# Patient Record
Sex: MALE | Race: WHITE | NOT HISPANIC OR LATINO | Employment: OTHER | RURAL
[De-identification: names, ages, dates, MRNs, and addresses within clinical notes are randomized per-mention and may not be internally consistent; named-entity substitution may affect disease eponyms.]

---

## 2017-01-04 NOTE — TELEPHONE ENCOUNTER
Tramadol 50 mg 1 tablet 2 x a day  Last called in 8/23/16 # 60 with 3 refills    Last office visit 10/12/16  Follow up 1/11/17

## 2017-01-05 ENCOUNTER — OFFICE VISIT (OUTPATIENT)
Dept: FAMILY MEDICINE CLINIC | Facility: CLINIC | Age: 82
End: 2017-01-05

## 2017-01-05 VITALS
WEIGHT: 184.4 LBS | HEIGHT: 69 IN | SYSTOLIC BLOOD PRESSURE: 108 MMHG | HEART RATE: 70 BPM | TEMPERATURE: 98.6 F | BODY MASS INDEX: 27.31 KG/M2 | DIASTOLIC BLOOD PRESSURE: 60 MMHG

## 2017-01-05 DIAGNOSIS — M19.90 CHRONIC OSTEOARTHRITIS: Primary | ICD-10-CM

## 2017-01-05 DIAGNOSIS — L02.511 ABSCESS OF HAND, RIGHT: ICD-10-CM

## 2017-01-05 DIAGNOSIS — I10 ESSENTIAL HYPERTENSION: ICD-10-CM

## 2017-01-05 PROCEDURE — 87205 SMEAR GRAM STAIN: CPT | Performed by: FAMILY MEDICINE

## 2017-01-05 PROCEDURE — 87070 CULTURE OTHR SPECIMN AEROBIC: CPT | Performed by: FAMILY MEDICINE

## 2017-01-05 PROCEDURE — 99213 OFFICE O/P EST LOW 20 MIN: CPT | Performed by: FAMILY MEDICINE

## 2017-01-05 PROCEDURE — 87077 CULTURE AEROBIC IDENTIFY: CPT | Performed by: FAMILY MEDICINE

## 2017-01-05 PROCEDURE — 96372 THER/PROPH/DIAG INJ SC/IM: CPT | Performed by: FAMILY MEDICINE

## 2017-01-05 PROCEDURE — 87147 CULTURE TYPE IMMUNOLOGIC: CPT | Performed by: FAMILY MEDICINE

## 2017-01-05 PROCEDURE — 87186 SC STD MICRODIL/AGAR DIL: CPT | Performed by: FAMILY MEDICINE

## 2017-01-05 RX ORDER — METHYLPREDNISOLONE ACETATE 80 MG/ML
80 INJECTION, SUSPENSION INTRA-ARTICULAR; INTRALESIONAL; INTRAMUSCULAR; SOFT TISSUE ONCE
Status: COMPLETED | OUTPATIENT
Start: 2017-01-05 | End: 2017-01-05

## 2017-01-05 RX ORDER — TRAMADOL HYDROCHLORIDE 50 MG/1
50 TABLET ORAL 2 TIMES DAILY
Qty: 60 TABLET | Refills: 3
Start: 2017-01-05 | End: 2017-05-01 | Stop reason: SDUPTHER

## 2017-01-05 RX ORDER — TRAMADOL HYDROCHLORIDE 50 MG/1
TABLET ORAL
Qty: 60 TABLET | Refills: 0 | OUTPATIENT
Start: 2017-01-05

## 2017-01-05 RX ORDER — DOXYCYCLINE 100 MG/1
100 CAPSULE ORAL 2 TIMES DAILY
Qty: 20 CAPSULE | Refills: 0 | Status: SHIPPED | OUTPATIENT
Start: 2017-01-05 | End: 2017-01-15

## 2017-01-05 RX ORDER — METHYLPREDNISOLONE ACETATE 40 MG/ML
40 INJECTION, SUSPENSION INTRA-ARTICULAR; INTRALESIONAL; INTRAMUSCULAR; SOFT TISSUE ONCE
Status: COMPLETED | OUTPATIENT
Start: 2017-01-05 | End: 2017-01-05

## 2017-01-05 RX ADMIN — METHYLPREDNISOLONE ACETATE 80 MG: 80 INJECTION, SUSPENSION INTRA-ARTICULAR; INTRALESIONAL; INTRAMUSCULAR; SOFT TISSUE at 16:42

## 2017-01-05 RX ADMIN — METHYLPREDNISOLONE ACETATE 40 MG: 40 INJECTION, SUSPENSION INTRA-ARTICULAR; INTRALESIONAL; INTRAMUSCULAR; SOFT TISSUE at 16:43

## 2017-01-05 NOTE — TELEPHONE ENCOUNTER
Spoke with Risa she stated he may have enough to last until appointment.    She stated he has a spider bite that needs checked out , patient not home right now will call us back if he gets home soon so he can get appointment.

## 2017-01-05 NOTE — MR AVS SNAPSHOT
Rubén Rivas   1/5/2017 3:40 PM   Office Visit    Dept Phone:  [phone]   Encounter #:  [id number]    Provider:  Chapin Sánchez MD   Department:  [facility]                Your Full Care Plan              Today's Medication Changes          These changes are accurate as of: 1/5/17  4:39 PM.  If you have any questions, ask your nurse or doctor.               New Medication(s)Ordered:     doxycycline 100 MG capsule   Commonly known as:  MONODOX   Take 1 capsule by mouth 2 (Two) Times a Day for 10 days.   Started by:  Chapin Sánchez MD            Where to Get Your Medications      These medications were sent to [facility] [address] [place], [place] [address] [place] [address] - [phone]  - [phone] [address] [address]     Phone:  [phone]     doxycycline 100 MG capsule         Information about where to get these medications is not yet available     ! Ask your nurse or doctor about these medications     traMADol 50 MG tablet                  Your Updated Medication List          This list is accurate as of: 1/5/17  4:39 PM.  Always use your most recent med list.                aspirin 81 MG tablet       atorvastatin 80 MG tablet   Commonly known as:  LIPITOR       clopidogrel 75 MG tablet   Commonly known as:  PLAVIX       diphenhydrAMINE-acetaminophen  MG tablet per tablet   Commonly known as:  TYLENOL PM       doxycycline 100 MG capsule   Commonly known as:  MONODOX   Take 1 capsule by mouth 2 (Two) Times a Day for 10 days.       escitalopram 20 MG tablet   Commonly known as:  LEXAPRO   Take 1 tablet by mouth Daily.       ferrous sulfate 325 (65 FE) MG tablet   Take 1 tablet by mouth 3 (three) times a day with meals.       metoprolol tartrate 25 MG tablet   Commonly known as:  LOPRESSOR       ONE-A-DAY MENS 50+ ADVANTAGE PO       ONETOUCH ULTRA BLUE VI       pantoprazole 40 MG EC tablet   Commonly  known as:  PROTONIX       tamsulosin 0.4 MG capsule 24 hr capsule   Commonly known as:  FLOMAX       traMADol 50 MG tablet   Commonly known as:  ULTRAM   Take 1 tablet by mouth 2 (Two) Times a Day.               We Performed the Following     Culture, Routine       You Were Diagnosed With        Codes Comments    Chronic osteoarthritis    -  Primary ICD-10-CM: M19.90  ICD-9-CM: 715.90     Essential hypertension     ICD-10-CM: I10  ICD-9-CM: 401.9     Abscess of hand, right     ICD-10-CM: L02.511  ICD-9-CM: 682.4       Medications to be Given to You by a Medical Professional     Due       Frequency    2017 methylPREDNISolone acetate (DEPO-medrol) injection 40 mg  Once    2017 methylPREDNISolone acetate (DEPO-medrol) injection 80 mg  Once      Instructions     None    Patient Instructions History      Upcoming Appointments     Visit Type Date Time Department    OFFICE VISIT 2017  3:40 PM [facility]    FOLLOW UP 2017 10:40 AM [facility]    FOLLOW UP 2017  1:00 PM [facility]      [employer] Signup     [facility] [employer] allows you to send messages to your doctor, view your test results, renew your prescriptions, schedule appointments, and more. To sign up, go to [employer] and click on the Sign Up Now link in the New User? box. Enter your [employer] Activation Code exactly as it appears below along with the last four digits of your Social Security Number and your Date of Birth () to complete the sign-up process. If you do not sign up before the expiration date, you must request a new code.    [employer] Activation Code: [email or web address]  Expires: 2017  4:38 PM    If you have questions, you can email [email or web address] or call [phone] to talk to our [employer] staff. Remember, [employer] is NOT to be used for urgent needs. For medical emergencies, dial 911.               Other Info from Your Visit           Your Appointments     2017  "10:40 AM EST   Follow Up with Chapin Sánchez MD   [facility] (--)    [address] [facility] [address]   [phone]           Arrive 15 minutes prior to appointment.            May 01, 2017  1:00 PM EDT   Follow Up with Chapin Sánchez MD   [facility] (--)    [address] [facility] [address]   [phone]           Arrive 15 minutes prior to appointment.              Allergies     Sulfa Antibiotics        Reason for Visit     Insect Bite           Vital Signs     Blood Pressure Pulse Temperature Height Weight Body Mass Index    108/60 (BP Location: Left arm, Patient Position: Sitting) 70 98.6 °F (37 °C) (Oral) 69\" (175.3 cm) 184 lb 6.4 oz (83.6 kg) 27.23 kg/m2    Smoking Status                   Former Smoker           Problems and Diagnoses Noted     Chronic osteoarthritis    High blood pressure    Abscess of hand, right            "

## 2017-01-05 NOTE — PROGRESS NOTES
"Subjective   Rubén Rivas is a 81 y.o. male.     History of Present Illness follow-up regarding the chronic osteoarthritis.  Having difficulty with a new skin spot on the dorsum of his right hand started 2 days ago.  Painful.  Slightly red around it.  Pustular.  Woke up with it.  No other skin concerns.  Generally doing well otherwise.  Staying active.  The steroid injection in past has been immensely helpful for an extended period of time.  No chest CDV GI  changes    The following portions of the patient's history were reviewed and updated as appropriate: allergies, current medications, past family history, past social history, past surgical history and problem list.    Review of Systems see the history of present illness    Objective   Physical Exam   Constitutional: He is oriented to person, place, and time. He appears well-developed and well-nourished.   HENT:   Head: Normocephalic.   Mouth/Throat: Oropharynx is clear and moist.   Eyes: Conjunctivae and EOM are normal. Pupils are equal, round, and reactive to light.   Neck: Normal range of motion. Neck supple.   Cardiovascular: Normal rate, regular rhythm, normal heart sounds and intact distal pulses.    No murmur heard.  Pulmonary/Chest: Effort normal and breath sounds normal.   Neurological: He is alert and oriented to person, place, and time.   Skin: Skin is warm and dry.   Circumscribed  Pustular lesion dorsum right hand.  Minimal circumferential redness     Psychiatric: He has a normal mood and affect.   Vitals reviewed.    Visit Vitals   • /60 (BP Location: Left arm, Patient Position: Sitting)   • Pulse 70   • Temp 98.6 °F (37 °C) (Oral)   • Ht 69\" (175.3 cm)   • Wt 184 lb 6.4 oz (83.6 kg)   • BMI 27.23 kg/m2     Assessment/Plan   Problems Addressed this Visit        Cardiovascular and Mediastinum    Essential hypertension       Musculoskeletal and Integument    Chronic osteoarthritis - Primary    Relevant Medications    methylPREDNISolone acetate " (DEPO-medrol) injection 40 mg (Completed)    methylPREDNISolone acetate (DEPO-medrol) injection 80 mg (Completed)      Other Visit Diagnoses     Abscess of hand, right        Relevant Orders    Culture, Routine (Completed)       Will place on antibiotics pending the culture and sensitivity result.  Tramadol renewed as noted.  Injection of steroid also.  Stay safely active.  Keep this area clean and dry recheck in 4 months or as needed.  May need to change antibiotic and will do so based on result  As part of this patient's treatment plan I am prescribing controlled substances. The patient has been made aware of appropriate use of such medications, including potential risk of somnolence, limited ability to drive and/or work safely, and potential for dependence or overdose. It has also been made clear that these medications are for use by this patient only, without concomitant use of alcohol or other substances unless prescribed.  Patient has completed prescribing agreement detailing terms of continued prescribing of controlled substances, including monitoring [facility] reports, urine drug screening, and pill counts if necessary. The patient is aware that inappropriate use will results in cessation of prescribing such medications.  [facility] report has been reviewed.  [facility] is updated every 3 months.  History and physical exam exhibit continued safe and appropriate use of controlled substances.

## 2017-01-05 NOTE — TELEPHONE ENCOUNTER
If he is completely out and go ahead and give him a time today.  Otherwise can renew at visit as scheduled next week

## 2017-01-09 LAB
BACTERIA SPEC AEROBE CULT: ABNORMAL
GRAM STN SPEC: ABNORMAL
GRAM STN SPEC: ABNORMAL

## 2017-02-02 RX ORDER — ESCITALOPRAM OXALATE 20 MG/1
20 TABLET ORAL DAILY
Qty: 90 TABLET | Refills: 3 | Status: SHIPPED | OUTPATIENT
Start: 2017-02-02 | End: 2018-01-04 | Stop reason: SDUPTHER

## 2017-02-02 RX ORDER — PANTOPRAZOLE SODIUM 40 MG/1
40 TABLET, DELAYED RELEASE ORAL DAILY
Qty: 90 TABLET | Refills: 3 | Status: SHIPPED | OUTPATIENT
Start: 2017-02-02 | End: 2018-01-04 | Stop reason: SDUPTHER

## 2017-02-02 NOTE — TELEPHONE ENCOUNTER
Lexapro last sent 10/13/16 # 90 with 1 refill  Protonix last sent 4/12/16 # 90 with 3 refills.    Last office visit 1/11/17  Follow up 5/1/17

## 2017-05-01 ENCOUNTER — OFFICE VISIT (OUTPATIENT)
Dept: FAMILY MEDICINE CLINIC | Facility: CLINIC | Age: 82
End: 2017-05-01

## 2017-05-01 VITALS
HEART RATE: 64 BPM | TEMPERATURE: 97.8 F | WEIGHT: 183 LBS | SYSTOLIC BLOOD PRESSURE: 105 MMHG | DIASTOLIC BLOOD PRESSURE: 57 MMHG | BODY MASS INDEX: 27.11 KG/M2 | HEIGHT: 69 IN

## 2017-05-01 DIAGNOSIS — I10 ESSENTIAL HYPERTENSION: Primary | ICD-10-CM

## 2017-05-01 DIAGNOSIS — Z00.00 MEDICARE ANNUAL WELLNESS VISIT, INITIAL: ICD-10-CM

## 2017-05-01 DIAGNOSIS — I25.10 CHRONIC CORONARY ARTERY DISEASE: ICD-10-CM

## 2017-05-01 DIAGNOSIS — E61.1 IRON DEFICIENCY: ICD-10-CM

## 2017-05-01 DIAGNOSIS — M19.90 CHRONIC OSTEOARTHRITIS: ICD-10-CM

## 2017-05-01 LAB
ALBUMIN SERPL-MCNC: 4 G/DL (ref 3.4–4.8)
ALBUMIN/GLOB SERPL: 1.4 G/DL (ref 1.5–2.5)
ALP SERPL-CCNC: 47 U/L (ref 40–129)
ALT SERPL W P-5'-P-CCNC: 14 U/L (ref 10–44)
ANION GAP SERPL CALCULATED.3IONS-SCNC: 6 MMOL/L (ref 3.6–11.2)
AST SERPL-CCNC: 21 U/L (ref 10–34)
BASOPHILS # BLD AUTO: 0.05 10*3/MM3 (ref 0–0.3)
BASOPHILS NFR BLD AUTO: 0.7 % (ref 0–2)
BILIRUB SERPL-MCNC: 0.4 MG/DL (ref 0.2–1.8)
BUN BLD-MCNC: 20 MG/DL (ref 7–21)
BUN/CREAT SERPL: 16.3 (ref 7–25)
CALCIUM SPEC-SCNC: 9.2 MG/DL (ref 7.7–10)
CHLORIDE SERPL-SCNC: 110 MMOL/L (ref 99–112)
CHOLEST SERPL-MCNC: 153 MG/DL (ref 0–200)
CO2 SERPL-SCNC: 29 MMOL/L (ref 24.3–31.9)
CREAT BLD-MCNC: 1.23 MG/DL (ref 0.43–1.29)
DEPRECATED RDW RBC AUTO: 50.4 FL (ref 37–54)
EOSINOPHIL # BLD AUTO: 0.41 10*3/MM3 (ref 0–0.7)
EOSINOPHIL NFR BLD AUTO: 5.8 % (ref 0–7)
ERYTHROCYTE [DISTWIDTH] IN BLOOD BY AUTOMATED COUNT: 14.6 % (ref 11.5–14.5)
GFR SERPL CREATININE-BSD FRML MDRD: 57 ML/MIN/1.73
GLOBULIN UR ELPH-MCNC: 2.9 GM/DL
GLUCOSE BLD-MCNC: 97 MG/DL (ref 70–110)
HCT VFR BLD AUTO: 30.4 % (ref 42–52)
HDLC SERPL-MCNC: 32 MG/DL (ref 60–100)
HGB BLD-MCNC: 9.6 G/DL (ref 14–18)
IMM GRANULOCYTES # BLD: 0.02 10*3/MM3 (ref 0–0.03)
IMM GRANULOCYTES NFR BLD: 0.3 % (ref 0–0.5)
LDLC SERPL CALC-MCNC: 74 MG/DL (ref 0–100)
LDLC/HDLC SERPL: 2.31 {RATIO}
LYMPHOCYTES # BLD AUTO: 1.55 10*3/MM3 (ref 1–3)
LYMPHOCYTES NFR BLD AUTO: 22.1 % (ref 16–46)
MCH RBC QN AUTO: 30.1 PG (ref 27–33)
MCHC RBC AUTO-ENTMCNC: 31.6 G/DL (ref 33–37)
MCV RBC AUTO: 95.3 FL (ref 80–94)
MONOCYTES # BLD AUTO: 0.75 10*3/MM3 (ref 0.1–0.9)
MONOCYTES NFR BLD AUTO: 10.7 % (ref 0–12)
NEUTROPHILS # BLD AUTO: 4.24 10*3/MM3 (ref 1.4–6.5)
NEUTROPHILS NFR BLD AUTO: 60.4 % (ref 40–75)
OSMOLALITY SERPL CALC.SUM OF ELEC: 291.2 MOSM/KG (ref 273–305)
PLATELET # BLD AUTO: 229 10*3/MM3 (ref 130–400)
PMV BLD AUTO: 10.9 FL (ref 6–10)
POTASSIUM BLD-SCNC: 4.6 MMOL/L (ref 3.5–5.3)
PROT SERPL-MCNC: 6.9 G/DL (ref 6–8)
RBC # BLD AUTO: 3.19 10*6/MM3 (ref 4.7–6.1)
SODIUM BLD-SCNC: 145 MMOL/L (ref 135–153)
TRIGL SERPL-MCNC: 235 MG/DL (ref 0–150)
VLDLC SERPL-MCNC: 47 MG/DL
WBC NRBC COR # BLD: 7.02 10*3/MM3 (ref 4.5–12.5)

## 2017-05-01 PROCEDURE — 99213 OFFICE O/P EST LOW 20 MIN: CPT | Performed by: FAMILY MEDICINE

## 2017-05-01 PROCEDURE — 80061 LIPID PANEL: CPT | Performed by: FAMILY MEDICINE

## 2017-05-01 PROCEDURE — 96372 THER/PROPH/DIAG INJ SC/IM: CPT | Performed by: FAMILY MEDICINE

## 2017-05-01 PROCEDURE — 85025 COMPLETE CBC W/AUTO DIFF WBC: CPT | Performed by: FAMILY MEDICINE

## 2017-05-01 PROCEDURE — G0438 PPPS, INITIAL VISIT: HCPCS | Performed by: FAMILY MEDICINE

## 2017-05-01 PROCEDURE — 80053 COMPREHEN METABOLIC PANEL: CPT | Performed by: FAMILY MEDICINE

## 2017-05-01 PROCEDURE — 36415 COLL VENOUS BLD VENIPUNCTURE: CPT | Performed by: FAMILY MEDICINE

## 2017-05-01 RX ORDER — METHYLPREDNISOLONE ACETATE 40 MG/ML
40 INJECTION, SUSPENSION INTRA-ARTICULAR; INTRALESIONAL; INTRAMUSCULAR; SOFT TISSUE ONCE
Status: COMPLETED | OUTPATIENT
Start: 2017-05-01 | End: 2017-05-01

## 2017-05-01 RX ORDER — TRAMADOL HYDROCHLORIDE 50 MG/1
50 TABLET ORAL 2 TIMES DAILY
Qty: 60 TABLET | Refills: 3
Start: 2017-05-01 | End: 2017-09-06 | Stop reason: SDUPTHER

## 2017-05-01 RX ORDER — METHYLPREDNISOLONE ACETATE 80 MG/ML
80 INJECTION, SUSPENSION INTRA-ARTICULAR; INTRALESIONAL; INTRAMUSCULAR; SOFT TISSUE ONCE
Status: COMPLETED | OUTPATIENT
Start: 2017-05-01 | End: 2017-05-01

## 2017-05-01 RX ADMIN — METHYLPREDNISOLONE ACETATE 80 MG: 80 INJECTION, SUSPENSION INTRA-ARTICULAR; INTRALESIONAL; INTRAMUSCULAR; SOFT TISSUE at 14:15

## 2017-05-01 RX ADMIN — METHYLPREDNISOLONE ACETATE 40 MG: 40 INJECTION, SUSPENSION INTRA-ARTICULAR; INTRALESIONAL; INTRAMUSCULAR; SOFT TISSUE at 14:16

## 2017-05-05 RX ORDER — TAMSULOSIN HYDROCHLORIDE 0.4 MG/1
CAPSULE ORAL
Qty: 90 CAPSULE | Refills: 3 | OUTPATIENT
Start: 2017-05-05

## 2017-05-18 DIAGNOSIS — M19.90 CHRONIC OSTEOARTHRITIS: ICD-10-CM

## 2017-05-18 RX ORDER — TRAMADOL HYDROCHLORIDE 50 MG/1
TABLET ORAL
Qty: 60 TABLET | Refills: 0 | OUTPATIENT
Start: 2017-05-18

## 2017-07-13 RX ORDER — ATORVASTATIN CALCIUM 80 MG/1
40 TABLET, FILM COATED ORAL DAILY
Qty: 90 TABLET | Refills: 1 | Status: SHIPPED | OUTPATIENT
Start: 2017-07-13 | End: 2018-01-04 | Stop reason: SDUPTHER

## 2017-07-13 RX ORDER — TAMSULOSIN HYDROCHLORIDE 0.4 MG/1
0.4 CAPSULE ORAL DAILY
Qty: 90 CAPSULE | Refills: 1 | Status: SHIPPED | OUTPATIENT
Start: 2017-07-13 | End: 2017-12-29 | Stop reason: SDUPTHER

## 2017-07-13 NOTE — TELEPHONE ENCOUNTER
NEEDS REFILL ON TANSULOSIN 0.4 MG   AND ATORVASTATIN 80 MG . SEND TO [facility]   HIS # [id number]

## 2017-08-03 ENCOUNTER — EPISODE CHANGES (OUTPATIENT)
Dept: CASE MANAGEMENT | Facility: OTHER | Age: 82
End: 2017-08-03

## 2017-08-17 RX ORDER — FERROUS SULFATE 325(65) MG
TABLET ORAL
Qty: 270 TABLET | Refills: 3 | OUTPATIENT
Start: 2017-08-17

## 2017-08-24 DIAGNOSIS — D64.9 ANEMIA, UNSPECIFIED TYPE: Primary | ICD-10-CM

## 2017-08-24 RX ORDER — FERROUS SULFATE 325(65) MG
325 TABLET ORAL
Qty: 270 TABLET | Refills: 3 | Status: SHIPPED | OUTPATIENT
Start: 2017-08-24 | End: 2018-01-04 | Stop reason: SDDI

## 2017-09-06 ENCOUNTER — OFFICE VISIT (OUTPATIENT)
Dept: FAMILY MEDICINE CLINIC | Facility: CLINIC | Age: 82
End: 2017-09-06

## 2017-09-06 VITALS
SYSTOLIC BLOOD PRESSURE: 120 MMHG | DIASTOLIC BLOOD PRESSURE: 70 MMHG | BODY MASS INDEX: 26.38 KG/M2 | HEART RATE: 72 BPM | WEIGHT: 178.1 LBS | TEMPERATURE: 98.7 F | HEIGHT: 69 IN

## 2017-09-06 DIAGNOSIS — E78.2 MIXED HYPERLIPIDEMIA: ICD-10-CM

## 2017-09-06 DIAGNOSIS — M19.90 CHRONIC OSTEOARTHRITIS: Primary | ICD-10-CM

## 2017-09-06 DIAGNOSIS — E61.1 IRON DEFICIENCY: ICD-10-CM

## 2017-09-06 DIAGNOSIS — L08.9 ABRASION, LEG W/ INFECTION, LEFT, INITIAL ENCOUNTER: ICD-10-CM

## 2017-09-06 DIAGNOSIS — S80.812A ABRASION, LEG W/ INFECTION, LEFT, INITIAL ENCOUNTER: ICD-10-CM

## 2017-09-06 DIAGNOSIS — I10 ESSENTIAL HYPERTENSION: ICD-10-CM

## 2017-09-06 PROCEDURE — 96372 THER/PROPH/DIAG INJ SC/IM: CPT | Performed by: FAMILY MEDICINE

## 2017-09-06 PROCEDURE — 90471 IMMUNIZATION ADMIN: CPT | Performed by: FAMILY MEDICINE

## 2017-09-06 PROCEDURE — 99214 OFFICE O/P EST MOD 30 MIN: CPT | Performed by: FAMILY MEDICINE

## 2017-09-06 PROCEDURE — 90715 TDAP VACCINE 7 YRS/> IM: CPT | Performed by: FAMILY MEDICINE

## 2017-09-06 RX ORDER — METHYLPREDNISOLONE ACETATE 80 MG/ML
80 INJECTION, SUSPENSION INTRA-ARTICULAR; INTRALESIONAL; INTRAMUSCULAR; SOFT TISSUE ONCE
Status: COMPLETED | OUTPATIENT
Start: 2017-09-06 | End: 2017-09-06

## 2017-09-06 RX ORDER — METHYLPREDNISOLONE ACETATE 40 MG/ML
40 INJECTION, SUSPENSION INTRA-ARTICULAR; INTRALESIONAL; INTRAMUSCULAR; SOFT TISSUE ONCE
Status: COMPLETED | OUTPATIENT
Start: 2017-09-06 | End: 2017-09-06

## 2017-09-06 RX ORDER — TRAMADOL HYDROCHLORIDE 50 MG/1
50 TABLET ORAL 2 TIMES DAILY
Qty: 60 TABLET | Refills: 3
Start: 2017-09-06 | End: 2018-01-04 | Stop reason: SDUPTHER

## 2017-09-06 RX ADMIN — METHYLPREDNISOLONE ACETATE 80 MG: 80 INJECTION, SUSPENSION INTRA-ARTICULAR; INTRALESIONAL; INTRAMUSCULAR; SOFT TISSUE at 12:11

## 2017-09-06 RX ADMIN — METHYLPREDNISOLONE ACETATE 40 MG: 40 INJECTION, SUSPENSION INTRA-ARTICULAR; INTRALESIONAL; INTRAMUSCULAR; SOFT TISSUE at 12:09

## 2017-09-06 NOTE — PROGRESS NOTES
"Subjective   Rubén Rivas is a 81 y.o. male.     History of Present Illness follow-up regarding chronic medical conditions.  Medication management.  Generally is doing quite well.  Unfortunately did have a fall about 2 weeks ago injuring the left lower extremity.  Significant abrasion.  No joint concerns.  No loss of consciousness.  Denies fever chills SOB palpitations CP GI  changes.  Will be following with urology soon.  Compliant with medications.    The following portions of the patient's history were reviewed and updated as appropriate: allergies, past family history, past medical history, past social history, past surgical history and problem list.    Review of Systems see the history of present illness    Objective   Physical Exam   Constitutional: He is oriented to person, place, and time. He appears well-developed and well-nourished.   HENT:   Head: Normocephalic.   Mouth/Throat: Oropharynx is clear and moist.   Eyes: Conjunctivae and EOM are normal. Pupils are equal, round, and reactive to light.   Neck: Normal range of motion. Neck supple. No tracheal deviation present. No thyromegaly present.   Cardiovascular: Normal rate, regular rhythm, normal heart sounds and intact distal pulses.    No murmur heard.  Pulmonary/Chest: Effort normal and breath sounds normal.   Abdominal: Soft. Bowel sounds are normal. There is no tenderness.   Musculoskeletal: Normal range of motion. He exhibits no edema.   Lymphadenopathy:     He has no cervical adenopathy.   Neurological: He is alert and oriented to person, place, and time.   Skin: Skin is warm and dry.   Minimally inflamed abrasion left lower extremity.   Psychiatric: He has a normal mood and affect.   Vitals reviewed.    /70 (BP Location: Left arm, Patient Position: Sitting, Cuff Size: Adult)  Pulse 72  Temp 98.7 °F (37.1 °C) (Oral)   Ht 69\" (175.3 cm)  Wt 178 lb 1.6 oz (80.8 kg)  BMI 26.3 kg/m2  Assessment/Plan   Rubén was seen today for follow-up and " med refill.    Diagnoses and all orders for this visit:    Chronic osteoarthritis  -     traMADol (ULTRAM) 50 MG tablet; Take 1 tablet by mouth 2 (Two) Times a Day.  -     methylPREDNISolone acetate (DEPO-medrol) injection 40 mg; Inject 1 mL into the shoulder, thigh, or buttocks 1 (One) Time.  -     methylPREDNISolone acetate (DEPO-medrol) injection 80 mg; Inject 1 mL into the shoulder, thigh, or buttocks 1 (One) Time.  -     CBC & Differential; Future    Essential hypertension  -     Comprehensive Metabolic Panel; Future    Iron deficiency  -     CBC & Differential; Future  -     Ferritin; Future    Abrasion, leg w/ infection, left, initial encounter  -     mupirocin (BACTROBAN) 2 % ointment; Apply  topically 3 (Three) Times a Day.  -     Tdap Vaccine Greater Than or Equal To 8yo IM    Mixed hyperlipidemia  -     Lipid Panel; Future    Overall you seem to be doing quite well.  Tetanus vaccine administered.  Antibiotic topical made available.  Injection for chronic arthritis administered.  Renewed the tramadol.  Try to stay safely active.  Maintain heart healthy diet.  Will obtain fasting metabolic profile prior to cardiac visit.  Recheck here in a few months.  Flu vaccine soon.  As part of this patient's treatment plan I am prescribing controlled substances. The patient has been made aware of appropriate use of such medications, including potential risk of somnolence, limited ability to drive and/or work safely, and potential for dependence or overdose. It has also been made clear that these medications are for use by this patient only, without concomitant use of alcohol or other substances unless prescribed.  Patient has completed prescribing agreement detailing terms of continued prescribing of controlled substances, including monitoring [facility] reports, urine drug screening, and pill counts if necessary. The patient is aware that inappropriate use will results in cessation of prescribing such medications.  [facility]  report has been reviewed.  [facility] is updated every 3 months.  History and physical exam exhibit continued safe and appropriate use of controlled substances.

## 2017-09-13 ENCOUNTER — LAB (OUTPATIENT)
Dept: UROLOGY | Facility: CLINIC | Age: 82
End: 2017-09-13

## 2017-09-13 DIAGNOSIS — C61 PROSTATE CANCER (HCC): Primary | ICD-10-CM

## 2017-09-13 LAB — PSA SERPL-MCNC: 0.02 NG/ML (ref 0–4)

## 2017-09-13 PROCEDURE — 84153 ASSAY OF PSA TOTAL: CPT | Performed by: UROLOGY

## 2017-09-19 ENCOUNTER — LAB (OUTPATIENT)
Dept: FAMILY MEDICINE CLINIC | Facility: CLINIC | Age: 82
End: 2017-09-19

## 2017-09-19 DIAGNOSIS — M19.90 CHRONIC OSTEOARTHRITIS: ICD-10-CM

## 2017-09-19 DIAGNOSIS — C61 PROSTATE CANCER (HCC): ICD-10-CM

## 2017-09-19 DIAGNOSIS — E78.2 MIXED HYPERLIPIDEMIA: ICD-10-CM

## 2017-09-19 DIAGNOSIS — I10 ESSENTIAL HYPERTENSION: ICD-10-CM

## 2017-09-19 DIAGNOSIS — E61.1 IRON DEFICIENCY: ICD-10-CM

## 2017-09-19 LAB
ALBUMIN SERPL-MCNC: 4.2 G/DL (ref 3.4–4.8)
ALBUMIN/GLOB SERPL: 1.4 G/DL (ref 1.5–2.5)
ALP SERPL-CCNC: 80 U/L (ref 40–129)
ALT SERPL W P-5'-P-CCNC: 20 U/L (ref 10–44)
ANION GAP SERPL CALCULATED.3IONS-SCNC: 4.8 MMOL/L (ref 3.6–11.2)
AST SERPL-CCNC: 19 U/L (ref 10–34)
BASOPHILS # BLD AUTO: 0.06 10*3/MM3 (ref 0–0.3)
BASOPHILS NFR BLD AUTO: 0.7 % (ref 0–2)
BILIRUB SERPL-MCNC: 0.5 MG/DL (ref 0.2–1.8)
BUN BLD-MCNC: 28 MG/DL (ref 7–21)
BUN/CREAT SERPL: 20.9 (ref 7–25)
CALCIUM SPEC-SCNC: 9.6 MG/DL (ref 7.7–10)
CHLORIDE SERPL-SCNC: 108 MMOL/L (ref 99–112)
CHOLEST SERPL-MCNC: 166 MG/DL (ref 0–200)
CO2 SERPL-SCNC: 30.2 MMOL/L (ref 24.3–31.9)
CREAT BLD-MCNC: 1.34 MG/DL (ref 0.43–1.29)
DEPRECATED RDW RBC AUTO: 51.4 FL (ref 37–54)
EOSINOPHIL # BLD AUTO: 0.25 10*3/MM3 (ref 0–0.7)
EOSINOPHIL NFR BLD AUTO: 3 % (ref 0–7)
ERYTHROCYTE [DISTWIDTH] IN BLOOD BY AUTOMATED COUNT: 15.4 % (ref 11.5–14.5)
FERRITIN SERPL-MCNC: 326 NG/ML (ref 21.9–321.7)
GFR SERPL CREATININE-BSD FRML MDRD: 51 ML/MIN/1.73
GLOBULIN UR ELPH-MCNC: 2.9 GM/DL
GLUCOSE BLD-MCNC: 96 MG/DL (ref 70–110)
HCT VFR BLD AUTO: 31 % (ref 42–52)
HDLC SERPL-MCNC: 34 MG/DL (ref 60–100)
HGB BLD-MCNC: 9.9 G/DL (ref 14–18)
IMM GRANULOCYTES # BLD: 0.03 10*3/MM3 (ref 0–0.03)
IMM GRANULOCYTES NFR BLD: 0.4 % (ref 0–0.5)
LDLC SERPL CALC-MCNC: 96 MG/DL (ref 0–100)
LDLC/HDLC SERPL: 2.84 {RATIO}
LYMPHOCYTES # BLD AUTO: 2 10*3/MM3 (ref 1–3)
LYMPHOCYTES NFR BLD AUTO: 24.1 % (ref 16–46)
MCH RBC QN AUTO: 31 PG (ref 27–33)
MCHC RBC AUTO-ENTMCNC: 31.9 G/DL (ref 33–37)
MCV RBC AUTO: 97.2 FL (ref 80–94)
MONOCYTES # BLD AUTO: 0.69 10*3/MM3 (ref 0.1–0.9)
MONOCYTES NFR BLD AUTO: 8.3 % (ref 0–12)
NEUTROPHILS # BLD AUTO: 5.28 10*3/MM3 (ref 1.4–6.5)
NEUTROPHILS NFR BLD AUTO: 63.5 % (ref 40–75)
OSMOLALITY SERPL CALC.SUM OF ELEC: 290.3 MOSM/KG (ref 273–305)
PLATELET # BLD AUTO: 255 10*3/MM3 (ref 130–400)
PMV BLD AUTO: 10.3 FL (ref 6–10)
POTASSIUM BLD-SCNC: 4.3 MMOL/L (ref 3.5–5.3)
PROT SERPL-MCNC: 7.1 G/DL (ref 6–8)
PSA SERPL-MCNC: 0.03 NG/ML (ref 0–4)
RBC # BLD AUTO: 3.19 10*6/MM3 (ref 4.7–6.1)
SODIUM BLD-SCNC: 143 MMOL/L (ref 135–153)
TRIGL SERPL-MCNC: 178 MG/DL (ref 0–150)
VLDLC SERPL-MCNC: 35.6 MG/DL
WBC NRBC COR # BLD: 8.31 10*3/MM3 (ref 4.5–12.5)

## 2017-09-19 PROCEDURE — 84153 ASSAY OF PSA TOTAL: CPT | Performed by: FAMILY MEDICINE

## 2017-09-19 PROCEDURE — 80053 COMPREHEN METABOLIC PANEL: CPT | Performed by: FAMILY MEDICINE

## 2017-09-19 PROCEDURE — 82728 ASSAY OF FERRITIN: CPT | Performed by: FAMILY MEDICINE

## 2017-09-19 PROCEDURE — 80061 LIPID PANEL: CPT | Performed by: FAMILY MEDICINE

## 2017-09-19 PROCEDURE — 85025 COMPLETE CBC W/AUTO DIFF WBC: CPT | Performed by: FAMILY MEDICINE

## 2017-09-19 PROCEDURE — 36415 COLL VENOUS BLD VENIPUNCTURE: CPT | Performed by: NURSE PRACTITIONER

## 2017-09-20 ENCOUNTER — OFFICE VISIT (OUTPATIENT)
Dept: UROLOGY | Facility: CLINIC | Age: 82
End: 2017-09-20

## 2017-09-20 VITALS
SYSTOLIC BLOOD PRESSURE: 120 MMHG | HEART RATE: 72 BPM | WEIGHT: 178 LBS | DIASTOLIC BLOOD PRESSURE: 70 MMHG | BODY MASS INDEX: 26.36 KG/M2 | HEIGHT: 69 IN

## 2017-09-20 DIAGNOSIS — C61 PROSTATE CANCER (HCC): Primary | ICD-10-CM

## 2017-09-20 PROCEDURE — 99214 OFFICE O/P EST MOD 30 MIN: CPT | Performed by: UROLOGY

## 2017-09-20 NOTE — PROGRESS NOTES
Chief Complaint:          Chief Complaint   Patient presents with   • Prostate Cancer     1 YEAR FOLLOW UP       HPI:   81 y.o. male.  Pt is still having hot flashes.  His psa is 0.03.  Pt has radiation therapy and he had 2 years of lupron adjuvant therapy.  His last lupron injection was 9/2016.  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  HPI        Past Medical History:        Past Medical History:   Diagnosis Date   • Anemia    • Arthritis    • Depression    • Hyperlipidemia    • Hypertension    • Prostate cancer 2013         Current Meds:     Current Outpatient Prescriptions   Medication Sig Dispense Refill   • aspirin 81 MG tablet Take  by mouth daily.     • atorvastatin (LIPITOR) 80 MG tablet Take 0.5 tablets by mouth Daily. 90 tablet 1   • clopidogrel (PLAVIX) 75 MG tablet Take 75 mg by mouth daily.     • diphenhydrAMINE-acetaminophen (TYLENOL PM)  MG tablet per tablet Take 1 tablet by mouth At Night As Needed for sleep.     • escitalopram (LEXAPRO) 20 MG tablet Take 1 tablet by mouth Daily. 90 tablet 3   • ferrous sulfate 325 (65 FE) MG tablet Take 1 tablet by mouth 3 (Three) Times a Day With Meals. 270 tablet 3   • Glucose Blood (ONETOUCH ULTRA BLUE VI) daily.     • metoprolol tartrate (LOPRESSOR) 25 MG tablet Take 25 mg by mouth 2 (two) times a day.     • Multiple Vitamins-Minerals (ONE-A-DAY MENS 50+ ADVANTAGE PO) Take  by mouth daily.     • mupirocin (BACTROBAN) 2 % ointment Apply  topically 3 (Three) Times a Day. 15 g 1   • pantoprazole (PROTONIX) 40 MG EC tablet Take 1 tablet by mouth Daily. 90 tablet 3   • tamsulosin (FLOMAX) 0.4 MG capsule 24 hr capsule Take 1 capsule by mouth Daily. 90 capsule 1   • traMADol (ULTRAM) 50 MG tablet Take 1 tablet by mouth 2 (Two) Times a Day. 60 tablet 3     No current facility-administered medications for this visit.         Allergies:      Allergies   Allergen Reactions   • Sulfa Antibiotics         Past Surgical  History:     Past Surgical History:   Procedure Laterality Date   • CARDIAC SURGERY     • HERNIA REPAIR     • STOMACH SURGERY           Social History:     Social History     Social History   • Marital status:      Spouse name: N/A   • Number of children: N/A   • Years of education: N/A     Occupational History   • Not on file.     Social History Main Topics   • Smoking status: Former Smoker     Quit date: 7/18/1976   • Smokeless tobacco: Never Used   • Alcohol use No   • Drug use: No   • Sexual activity: Not on file     Other Topics Concern   • Not on file     Social History Narrative       Family History:     Family History   Problem Relation Age of Onset   • Cancer Mother 35     breast   • Heart attack Father    • Cancer Sister      cervical   • Heart attack Brother    • Heart disease Brother    • Cancer Brother      Prostate       Review of Systems:     Review of Systems    Physical Exam:     Physical Exam   Constitutional: He is oriented to person, place, and time.   HENT:   Head: Normocephalic and atraumatic.   Right Ear: External ear normal.   Left Ear: External ear normal.   Nose: Nose normal.   Mouth/Throat: Oropharynx is clear and moist.   Eyes: Conjunctivae and EOM are normal. Pupils are equal, round, and reactive to light.   Neck: Normal range of motion. Neck supple. No thyromegaly present.   Cardiovascular: Normal rate, regular rhythm, normal heart sounds and intact distal pulses.    No murmur heard.  Pulmonary/Chest: Effort normal and breath sounds normal. No respiratory distress. He has no wheezes. He has no rales. He exhibits no tenderness.   Abdominal: Soft. Bowel sounds are normal. He exhibits no distension and no mass. There is no tenderness. No hernia.   Genitourinary: Rectum normal and penis normal.   Genitourinary Comments: Flat prostate.   Musculoskeletal: Normal range of motion. He exhibits no edema or tenderness.   Lymphadenopathy:     He has no cervical adenopathy.   Neurological: He  is alert and oriented to person, place, and time. No cranial nerve deficit. He exhibits normal muscle tone. Coordination normal.   Skin: Skin is warm. No rash noted.   Psychiatric: He has a normal mood and affect. His behavior is normal. Judgment and thought content normal.   Nursing note and vitals reviewed.      Procedure:     No notes on file      Assessment:     Encounter Diagnosis   Name Primary?   • Prostate cancer Yes     No orders of the defined types were placed in this encounter.      Plan:   Will see pt back in a year with psa    Counseling was given to patient for the following topics diagnostic results. and the interim medical history and current results were reviewed.  A treatment plan with follow-up was made. Total time of the encounter was 35 minutes and 35 minutes were spent discussing Prostate cancer [C61] face-to-face.        This document has been electronically signed by Ze Carey MD September 20, 2017 11:25 AM

## 2017-10-02 ENCOUNTER — FLU SHOT (OUTPATIENT)
Dept: FAMILY MEDICINE CLINIC | Facility: CLINIC | Age: 82
End: 2017-10-02

## 2017-10-02 PROCEDURE — G0008 ADMIN INFLUENZA VIRUS VAC: HCPCS | Performed by: FAMILY MEDICINE

## 2017-10-02 PROCEDURE — 90662 IIV NO PRSV INCREASED AG IM: CPT | Performed by: FAMILY MEDICINE

## 2017-11-13 RX ORDER — PANTOPRAZOLE SODIUM 40 MG/1
TABLET, DELAYED RELEASE ORAL
Qty: 90 TABLET | Refills: 3 | OUTPATIENT
Start: 2017-11-13

## 2017-11-27 RX ORDER — TAMSULOSIN HYDROCHLORIDE 0.4 MG/1
CAPSULE ORAL
Qty: 90 CAPSULE | Refills: 1 | OUTPATIENT
Start: 2017-11-27

## 2017-12-29 RX ORDER — TAMSULOSIN HYDROCHLORIDE 0.4 MG/1
CAPSULE ORAL
Qty: 90 CAPSULE | Refills: 1 | OUTPATIENT
Start: 2017-12-29

## 2018-01-02 RX ORDER — TAMSULOSIN HYDROCHLORIDE 0.4 MG/1
0.4 CAPSULE ORAL DAILY
Qty: 90 CAPSULE | Refills: 1 | Status: SHIPPED | OUTPATIENT
Start: 2018-01-02 | End: 2018-06-01 | Stop reason: SDUPTHER

## 2018-01-04 ENCOUNTER — OFFICE VISIT (OUTPATIENT)
Dept: FAMILY MEDICINE CLINIC | Facility: CLINIC | Age: 83
End: 2018-01-04

## 2018-01-04 VITALS
TEMPERATURE: 98.7 F | HEART RATE: 63 BPM | WEIGHT: 185.6 LBS | SYSTOLIC BLOOD PRESSURE: 160 MMHG | BODY MASS INDEX: 27.49 KG/M2 | DIASTOLIC BLOOD PRESSURE: 73 MMHG | HEIGHT: 69 IN

## 2018-01-04 DIAGNOSIS — M19.90 CHRONIC OSTEOARTHRITIS: ICD-10-CM

## 2018-01-04 DIAGNOSIS — D64.9 ANEMIA, UNSPECIFIED TYPE: ICD-10-CM

## 2018-01-04 DIAGNOSIS — I10 ESSENTIAL HYPERTENSION: Primary | ICD-10-CM

## 2018-01-04 PROCEDURE — 96372 THER/PROPH/DIAG INJ SC/IM: CPT | Performed by: FAMILY MEDICINE

## 2018-01-04 PROCEDURE — 99214 OFFICE O/P EST MOD 30 MIN: CPT | Performed by: FAMILY MEDICINE

## 2018-01-04 RX ORDER — METHYLPREDNISOLONE ACETATE 80 MG/ML
80 INJECTION, SUSPENSION INTRA-ARTICULAR; INTRALESIONAL; INTRAMUSCULAR; SOFT TISSUE ONCE
Status: COMPLETED | OUTPATIENT
Start: 2018-01-04 | End: 2018-01-04

## 2018-01-04 RX ORDER — PANTOPRAZOLE SODIUM 40 MG/1
40 TABLET, DELAYED RELEASE ORAL DAILY
Qty: 90 TABLET | Refills: 3 | Status: SHIPPED | OUTPATIENT
Start: 2018-01-04 | End: 2019-05-21 | Stop reason: SDUPTHER

## 2018-01-04 RX ORDER — ESCITALOPRAM OXALATE 20 MG/1
20 TABLET ORAL DAILY
Qty: 90 TABLET | Refills: 3 | Status: SHIPPED | OUTPATIENT
Start: 2018-01-04 | End: 2019-04-08 | Stop reason: SDUPTHER

## 2018-01-04 RX ORDER — TRAMADOL HYDROCHLORIDE 50 MG/1
50 TABLET ORAL 2 TIMES DAILY PRN
Qty: 60 TABLET | Refills: 3
Start: 2018-01-04 | End: 2018-05-03 | Stop reason: SDUPTHER

## 2018-01-04 RX ORDER — METHYLPREDNISOLONE ACETATE 40 MG/ML
40 INJECTION, SUSPENSION INTRA-ARTICULAR; INTRALESIONAL; INTRAMUSCULAR; SOFT TISSUE ONCE
Status: COMPLETED | OUTPATIENT
Start: 2018-01-04 | End: 2018-01-04

## 2018-01-04 RX ORDER — ATORVASTATIN CALCIUM 80 MG/1
40 TABLET, FILM COATED ORAL DAILY
Qty: 90 TABLET | Refills: 1 | Status: SHIPPED | OUTPATIENT
Start: 2018-01-04 | End: 2018-09-18 | Stop reason: SDUPTHER

## 2018-01-04 RX ADMIN — METHYLPREDNISOLONE ACETATE 40 MG: 40 INJECTION, SUSPENSION INTRA-ARTICULAR; INTRALESIONAL; INTRAMUSCULAR; SOFT TISSUE at 10:51

## 2018-01-04 RX ADMIN — METHYLPREDNISOLONE ACETATE 80 MG: 80 INJECTION, SUSPENSION INTRA-ARTICULAR; INTRALESIONAL; INTRAMUSCULAR; SOFT TISSUE at 10:52

## 2018-01-04 NOTE — PROGRESS NOTES
"Subjective   Rubén Rivas is a 82 y.o. male.     History of Present Illness follow-up regarding hypertension arthritis the mild anemia.  Has had some mild nail changes.  Compliant with chronic regimen.  Generally feels great otherwise.  Has had no dizziness CP SOB palpitations.  No upper or lower GI changes.  Denies urinary symptoms.  Arthritis symptoms are stable on current regimen.  Stays fairly active.  Tries to maintain heart healthy diet.    The following portions of the patient's history were reviewed and updated as appropriate: allergies, current medications, past family history, past social history, past surgical history and problem list.    Review of Systems see the history of present illness    Objective   Physical Exam   Constitutional: He is oriented to person, place, and time. He appears well-developed and well-nourished.   HENT:   Head: Normocephalic.   Right Ear: External ear normal.   Left Ear: External ear normal.   Mouth/Throat: Oropharynx is clear and moist.   Eyes: Conjunctivae and EOM are normal. Pupils are equal, round, and reactive to light.   Neck: Normal range of motion. Neck supple. No tracheal deviation present. No thyromegaly present.   Cardiovascular: Normal rate, regular rhythm, normal heart sounds and intact distal pulses.    No murmur heard.  Pulmonary/Chest: Effort normal and breath sounds normal.   Musculoskeletal: He exhibits no edema.   Lymphadenopathy:     He has no cervical adenopathy.   Neurological: He is alert and oriented to person, place, and time.   Skin: Skin is warm and dry.   Psychiatric: He has a normal mood and affect.   Vitals reviewed.    /73 (BP Location: Left arm, Patient Position: Sitting, Cuff Size: Adult)  Pulse 63  Temp 98.7 °F (37.1 °C) (Oral)   Ht 175.3 cm (69.02\")  Wt 84.2 kg (185 lb 9.6 oz)  BMI 27.4 kg/m2  Assessment/Plan   Rubén was seen today for nails breaking off, hypertension and anemia.    Diagnoses and all orders for this " visit:    Essential hypertension  -     CBC & Differential  -     Comprehensive Metabolic Panel  -     Lipid Panel  -     TSH    Chronic osteoarthritis  -     CBC & Differential  -     Comprehensive Metabolic Panel  -     Lipid Panel  -     traMADol (ULTRAM) 50 MG tablet; Take 1 tablet by mouth 2 (Two) Times a Day As Needed for Moderate Pain .  -     pantoprazole (PROTONIX) 40 MG EC tablet; Take 1 tablet by mouth Daily.  -     methylPREDNISolone acetate (DEPO-medrol) injection 40 mg; Inject 1 mL into the shoulder, thigh, or buttocks 1 (One) Time.  -     methylPREDNISolone acetate (DEPO-medrol) injection 80 mg; Inject 1 mL into the shoulder, thigh, or buttocks 1 (One) Time.    Anemia, unspecified type  -     CBC & Differential  -     Comprehensive Metabolic Panel  -     Lipid Panel  -     TSH  -     Ferritin  -     Folate  -     Vitamin B12  -     Methylmalonic Acid, Serum  -     pantoprazole (PROTONIX) 40 MG EC tablet; Take 1 tablet by mouth Daily.    Other orders  -     escitalopram (LEXAPRO) 20 MG tablet; Take 1 tablet by mouth Daily.  -     atorvastatin (LIPITOR) 80 MG tablet; Take 0.5 tablets by mouth Daily.     Renewed medications as noted.  Will check labs in regard to hypertension arthritis anemia.  Stay safely active.  Administered Depo-Medrol 120 mg IM.  Will ask you to recheck in a few months routinely.  Maintain heart healthy diet.  You report having visited cardiology recently.  The anemia is concerning.  As part of this patient's treatment plan I am prescribing controlled substances. The patient has been made aware of appropriate use of such medications, including potential risk of somnolence, limited ability to drive and/or work safely, and potential for dependence or overdose. It has also been made clear that these medications are for use by this patient only, without concomitant use of alcohol or other substances unless prescribed.  Patient has completed prescribing agreement detailing terms of  continued prescribing of controlled substances, including monitoring [facility] reports, urine drug screening, and pill counts if necessary. The patient is aware that inappropriate use will results in cessation of prescribing such medications.  [facility] report has been reviewed.  [facility] is updated every 3 months.  History and physical exam exhibit continued safe and appropriate use of controlled substances.

## 2018-01-10 ENCOUNTER — LAB (OUTPATIENT)
Dept: FAMILY MEDICINE CLINIC | Facility: CLINIC | Age: 83
End: 2018-01-10

## 2018-01-10 DIAGNOSIS — I25.10 CHRONIC CORONARY ARTERY DISEASE: ICD-10-CM

## 2018-01-10 LAB
ALBUMIN SERPL-MCNC: 4.1 G/DL (ref 3.4–4.8)
ALBUMIN/GLOB SERPL: 1.4 G/DL (ref 1.5–2.5)
ALP SERPL-CCNC: 58 U/L (ref 40–129)
ALT SERPL W P-5'-P-CCNC: 15 U/L (ref 10–44)
ANION GAP SERPL CALCULATED.3IONS-SCNC: 7.9 MMOL/L (ref 3.6–11.2)
AST SERPL-CCNC: 19 U/L (ref 10–34)
BASOPHILS # BLD AUTO: 0.06 10*3/MM3 (ref 0–0.3)
BASOPHILS NFR BLD AUTO: 0.8 % (ref 0–2)
BILIRUB SERPL-MCNC: 0.4 MG/DL (ref 0.2–1.8)
BUN BLD-MCNC: 20 MG/DL (ref 7–21)
BUN/CREAT SERPL: 16 (ref 7–25)
CALCIUM SPEC-SCNC: 9.3 MG/DL (ref 7.7–10)
CHLORIDE SERPL-SCNC: 105 MMOL/L (ref 99–112)
CHOLEST SERPL-MCNC: 157 MG/DL (ref 0–200)
CO2 SERPL-SCNC: 29.1 MMOL/L (ref 24.3–31.9)
CREAT BLD-MCNC: 1.25 MG/DL (ref 0.43–1.29)
DEPRECATED RDW RBC AUTO: 44.6 FL (ref 37–54)
EOSINOPHIL # BLD AUTO: 0.58 10*3/MM3 (ref 0–0.7)
EOSINOPHIL NFR BLD AUTO: 8 % (ref 0–7)
ERYTHROCYTE [DISTWIDTH] IN BLOOD BY AUTOMATED COUNT: 13.7 % (ref 11.5–14.5)
FERRITIN SERPL-MCNC: 176 NG/ML (ref 21.9–321.7)
FOLATE SERPL-MCNC: >24 NG/ML (ref 5.4–20)
GFR SERPL CREATININE-BSD FRML MDRD: 55 ML/MIN/1.73
GLOBULIN UR ELPH-MCNC: 2.9 GM/DL
GLUCOSE BLD-MCNC: 99 MG/DL (ref 70–110)
HCT VFR BLD AUTO: 33.1 % (ref 42–52)
HDLC SERPL-MCNC: 30 MG/DL (ref 60–100)
HGB BLD-MCNC: 10.6 G/DL (ref 14–18)
IMM GRANULOCYTES # BLD: 0.02 10*3/MM3 (ref 0–0.03)
IMM GRANULOCYTES NFR BLD: 0.3 % (ref 0–0.5)
LDLC SERPL CALC-MCNC: 88 MG/DL (ref 0–100)
LDLC/HDLC SERPL: 2.94 {RATIO}
LYMPHOCYTES # BLD AUTO: 1.59 10*3/MM3 (ref 1–3)
LYMPHOCYTES NFR BLD AUTO: 21.8 % (ref 16–46)
MCH RBC QN AUTO: 30.7 PG (ref 27–33)
MCHC RBC AUTO-ENTMCNC: 32 G/DL (ref 33–37)
MCV RBC AUTO: 95.9 FL (ref 80–94)
MONOCYTES # BLD AUTO: 0.76 10*3/MM3 (ref 0.1–0.9)
MONOCYTES NFR BLD AUTO: 10.4 % (ref 0–12)
NEUTROPHILS # BLD AUTO: 4.27 10*3/MM3 (ref 1.4–6.5)
NEUTROPHILS NFR BLD AUTO: 58.7 % (ref 40–75)
OSMOLALITY SERPL CALC.SUM OF ELEC: 285.8 MOSM/KG (ref 273–305)
PLATELET # BLD AUTO: 274 10*3/MM3 (ref 130–400)
PMV BLD AUTO: 10.5 FL (ref 6–10)
POTASSIUM BLD-SCNC: 4.2 MMOL/L (ref 3.5–5.3)
PROT SERPL-MCNC: 7 G/DL (ref 6–8)
RBC # BLD AUTO: 3.45 10*6/MM3 (ref 4.7–6.1)
SODIUM BLD-SCNC: 142 MMOL/L (ref 135–153)
TRIGL SERPL-MCNC: 194 MG/DL (ref 0–150)
TSH SERPL DL<=0.05 MIU/L-ACNC: 1.45 MIU/ML (ref 0.55–4.78)
VIT B12 BLD-MCNC: 914 PG/ML (ref 211–911)
VLDLC SERPL-MCNC: 38.8 MG/DL
WBC NRBC COR # BLD: 7.28 10*3/MM3 (ref 4.5–12.5)

## 2018-01-10 PROCEDURE — 80053 COMPREHEN METABOLIC PANEL: CPT | Performed by: FAMILY MEDICINE

## 2018-01-10 PROCEDURE — 82746 ASSAY OF FOLIC ACID SERUM: CPT | Performed by: FAMILY MEDICINE

## 2018-01-10 PROCEDURE — 80061 LIPID PANEL: CPT | Performed by: FAMILY MEDICINE

## 2018-01-10 PROCEDURE — 85025 COMPLETE CBC W/AUTO DIFF WBC: CPT | Performed by: FAMILY MEDICINE

## 2018-01-10 PROCEDURE — 84443 ASSAY THYROID STIM HORMONE: CPT | Performed by: FAMILY MEDICINE

## 2018-01-10 PROCEDURE — 83921 ORGANIC ACID SINGLE QUANT: CPT | Performed by: FAMILY MEDICINE

## 2018-01-10 PROCEDURE — 82607 VITAMIN B-12: CPT | Performed by: FAMILY MEDICINE

## 2018-01-10 PROCEDURE — 82728 ASSAY OF FERRITIN: CPT | Performed by: FAMILY MEDICINE

## 2018-01-10 PROCEDURE — 36415 COLL VENOUS BLD VENIPUNCTURE: CPT | Performed by: FAMILY MEDICINE

## 2018-01-19 LAB — METHYLMALONATE SERPL-SCNC: 87 NMOL/L (ref 0–378)

## 2018-05-03 ENCOUNTER — OFFICE VISIT (OUTPATIENT)
Dept: FAMILY MEDICINE CLINIC | Facility: CLINIC | Age: 83
End: 2018-05-03

## 2018-05-03 VITALS
DIASTOLIC BLOOD PRESSURE: 70 MMHG | WEIGHT: 180.9 LBS | TEMPERATURE: 97.7 F | BODY MASS INDEX: 26.79 KG/M2 | HEART RATE: 63 BPM | SYSTOLIC BLOOD PRESSURE: 153 MMHG | HEIGHT: 69 IN | OXYGEN SATURATION: 98 %

## 2018-05-03 DIAGNOSIS — I10 ESSENTIAL HYPERTENSION: Primary | ICD-10-CM

## 2018-05-03 DIAGNOSIS — M19.90 CHRONIC OSTEOARTHRITIS: ICD-10-CM

## 2018-05-03 DIAGNOSIS — S40.812A ABRASION OF LEFT UPPER EXTREMITY, INITIAL ENCOUNTER: ICD-10-CM

## 2018-05-03 DIAGNOSIS — Z00.00 MEDICARE ANNUAL WELLNESS VISIT, INITIAL: ICD-10-CM

## 2018-05-03 LAB
BASOPHILS # BLD AUTO: 0.06 10*3/MM3 (ref 0–0.3)
BASOPHILS NFR BLD AUTO: 0.8 % (ref 0–2)
DEPRECATED RDW RBC AUTO: 50.2 FL (ref 37–54)
EOSINOPHIL # BLD AUTO: 0.61 10*3/MM3 (ref 0–0.7)
EOSINOPHIL NFR BLD AUTO: 8.1 % (ref 0–7)
ERYTHROCYTE [DISTWIDTH] IN BLOOD BY AUTOMATED COUNT: 15.2 % (ref 11.5–14.5)
HCT VFR BLD AUTO: 30.5 % (ref 42–52)
HGB BLD-MCNC: 9.8 G/DL (ref 14–18)
IMM GRANULOCYTES # BLD: 0.01 10*3/MM3 (ref 0–0.03)
IMM GRANULOCYTES NFR BLD: 0.1 % (ref 0–0.5)
LYMPHOCYTES # BLD AUTO: 1.78 10*3/MM3 (ref 1–3)
LYMPHOCYTES NFR BLD AUTO: 23.6 % (ref 16–46)
MCH RBC QN AUTO: 30.6 PG (ref 27–33)
MCHC RBC AUTO-ENTMCNC: 32.1 G/DL (ref 33–37)
MCV RBC AUTO: 95.3 FL (ref 80–94)
MONOCYTES # BLD AUTO: 0.89 10*3/MM3 (ref 0.1–0.9)
MONOCYTES NFR BLD AUTO: 11.8 % (ref 0–12)
NEUTROPHILS # BLD AUTO: 4.19 10*3/MM3 (ref 1.4–6.5)
NEUTROPHILS NFR BLD AUTO: 55.6 % (ref 40–75)
PLATELET # BLD AUTO: 234 10*3/MM3 (ref 130–400)
PMV BLD AUTO: 11 FL (ref 6–10)
RBC # BLD AUTO: 3.2 10*6/MM3 (ref 4.7–6.1)
WBC NRBC COR # BLD: 7.54 10*3/MM3 (ref 4.5–12.5)

## 2018-05-03 PROCEDURE — 36415 COLL VENOUS BLD VENIPUNCTURE: CPT | Performed by: FAMILY MEDICINE

## 2018-05-03 PROCEDURE — G0439 PPPS, SUBSEQ VISIT: HCPCS | Performed by: FAMILY MEDICINE

## 2018-05-03 PROCEDURE — 96372 THER/PROPH/DIAG INJ SC/IM: CPT | Performed by: FAMILY MEDICINE

## 2018-05-03 PROCEDURE — 85025 COMPLETE CBC W/AUTO DIFF WBC: CPT | Performed by: FAMILY MEDICINE

## 2018-05-03 RX ORDER — METHYLPREDNISOLONE ACETATE 80 MG/ML
80 INJECTION, SUSPENSION INTRA-ARTICULAR; INTRALESIONAL; INTRAMUSCULAR; SOFT TISSUE ONCE
Status: COMPLETED | OUTPATIENT
Start: 2018-05-03 | End: 2018-05-03

## 2018-05-03 RX ORDER — TRAMADOL HYDROCHLORIDE 50 MG/1
50 TABLET ORAL EVERY 8 HOURS PRN
Qty: 75 TABLET | Refills: 3
Start: 2018-05-03 | End: 2018-09-19 | Stop reason: SDUPTHER

## 2018-05-03 RX ORDER — METHYLPREDNISOLONE ACETATE 40 MG/ML
40 INJECTION, SUSPENSION INTRA-ARTICULAR; INTRALESIONAL; INTRAMUSCULAR; SOFT TISSUE ONCE
Status: COMPLETED | OUTPATIENT
Start: 2018-05-03 | End: 2018-05-03

## 2018-05-03 RX ORDER — LISINOPRIL 5 MG/1
TABLET ORAL
COMMUNITY
Start: 2018-04-11 | End: 2018-05-03 | Stop reason: SDUPTHER

## 2018-05-03 RX ORDER — LISINOPRIL 10 MG/1
10 TABLET ORAL DAILY
Qty: 90 TABLET | Refills: 3 | Status: SHIPPED | OUTPATIENT
Start: 2018-05-03 | End: 2019-01-14 | Stop reason: SDUPTHER

## 2018-05-03 RX ADMIN — METHYLPREDNISOLONE ACETATE 40 MG: 40 INJECTION, SUSPENSION INTRA-ARTICULAR; INTRALESIONAL; INTRAMUSCULAR; SOFT TISSUE at 10:24

## 2018-05-03 RX ADMIN — METHYLPREDNISOLONE ACETATE 80 MG: 80 INJECTION, SUSPENSION INTRA-ARTICULAR; INTRALESIONAL; INTRAMUSCULAR; SOFT TISSUE at 10:24

## 2018-05-03 NOTE — PROGRESS NOTES
Hemoglobin is again slightly lower.  Overall is stable.  Anemia persists.  Most likely a combination of all chronic conditions contributing.  Continue all same at this time.

## 2018-05-03 NOTE — PROGRESS NOTES
QUICK REFERENCE INFORMATION:  The ABCs of the Annual Wellness Visit    Subsequent Medicare Wellness Visit    HEALTH RISK ASSESSMENT    1935    Recent Hospitalizations:  No hospitalization(s) within the last year..        Current Medical Providers:  Patient Care Team:  Chapin Sánchez MD as PCP - General  ARNEL Paul as PCP - Claims Attributed  Ze Carey MD as Consulting Physician (Urology)  Deirdre Marquis, RN as Care Coordinator (Population Health)        Smoking Status:  History   Smoking Status   • Former Smoker   • Quit date: 7/18/1976   Smokeless Tobacco   • Never Used       Alcohol Consumption:  History   Alcohol Use No       Depression Screen:   PHQ-2/PHQ-9 Depression Screening 5/3/2018   Little interest or pleasure in doing things 0   Feeling down, depressed, or hopeless 0   Trouble falling or staying asleep, or sleeping too much -   Feeling tired or having little energy -   Poor appetite or overeating -   Feeling bad about yourself - or that you are a failure or have let yourself or your family down -   Trouble concentrating on things, such as reading the newspaper or watching television -   Moving or speaking so slowly that other people could have noticed. Or the opposite - being so fidgety or restless that you have been moving around a lot more than usual -   Thoughts that you would be better off dead, or of hurting yourself in some way -   Total Score 0   If you checked off any problems, how difficult have these problems made it for you to do your work, take care of things at home, or get along with other people? -       Health Habits and Functional and Cognitive Screening:  Functional & Cognitive Status 5/3/2018   Do you have difficulty preparing food and eating? No   Do you have difficulty bathing yourself, getting dressed or grooming yourself? No   Do you have difficulty using the toilet? No   Do you have difficulty moving around from place to place? No   Do you  have trouble with steps or getting out of a bed or a chair? No   In the past year have you fallen or experienced a near fall? Yes   Current Diet Well Balanced Diet   Dental Exam Up to date   Eye Exam Up to date   Exercise (times per week) 5 times per week   Current Exercise Activities Include Yard Work   Do you need help using the phone?  No   Are you deaf or do you have serious difficulty hearing?  No   Do you need help with transportation? No   Do you need help shopping? No   Do you need help preparing meals?  No   Do you need help with housework?  No   Do you need help with laundry? No   Do you need help taking your medications? No   Do you need help managing money? No   Do you ever drive or ride in a car without wearing a seat belt? No   Have you felt unusual stress, anger or loneliness in the last month? No   Who do you live with? Spouse   If you need help, do you have trouble finding someone available to you? No   Have you been bothered in the last four weeks by sexual problems? No   Do you have difficulty concentrating, remembering or making decisions? No           Does the patient have evidence of cognitive impairment? No    Aspirin use counseling: Taking ASA appropriately as indicated      Recent Lab Results:  CMP:  Lab Results   Component Value Date    BUN 20 01/10/2018    CREATININE 1.25 01/10/2018    EGFRIFNONA 55 (L) 01/10/2018    BCR 16.0 01/10/2018     01/10/2018    K 4.2 01/10/2018    CO2 29.1 01/10/2018    CALCIUM 9.3 01/10/2018    ALBUMIN 4.10 01/10/2018    LABIL2 1.4 (L) 01/10/2018    BILITOT 0.4 01/10/2018    ALKPHOS 58 01/10/2018    AST 19 01/10/2018    ALT 15 01/10/2018     Lipid Panel:  Lab Results   Component Value Date    CHOL 157 01/10/2018    TRIG 194 (H) 01/10/2018    HDL 30 (L) 01/10/2018    VLDL 38.8 01/10/2018    LDLHDL 2.94 01/10/2018     HbA1c:  Lab Results   Component Value Date    HGBA1C 5.6 11/26/2014       Visual Acuity:   Visual Acuity Screening    Right eye Left eye Both  eyes   Without correction:      With correction: 20/50 20/30 20/30       Age-appropriate Screening Schedule:  Refer to the list below for future screening recommendations based on patient's age, sex and/or medical conditions. Orders for these recommended tests are listed in the plan section. The patient has been provided with a written plan.    Health Maintenance   Topic Date Due   • PNEUMOCOCCAL VACCINES (65+ LOW/MEDIUM RISK) (2 of 2 - PPSV23) 02/19/2016   • ZOSTER VACCINE  05/18/2016   • INFLUENZA VACCINE  08/01/2018   • LIPID PANEL  01/10/2019   • TDAP/TD VACCINES (2 - Td) 09/06/2027        Subjective   History of Present Illness    Rubén Rivas is a 82 y.o. male who presents for an Subsequent Wellness Visit.    The following portions of the patient's history were reviewed and updated as appropriate: allergies, current medications, past medical history, past social history, past surgical history and problem list.    Outpatient Medications Prior to Visit   Medication Sig Dispense Refill   • aspirin 81 MG tablet Take  by mouth daily.     • atorvastatin (LIPITOR) 80 MG tablet Take 0.5 tablets by mouth Daily. 90 tablet 1   • clopidogrel (PLAVIX) 75 MG tablet Take 75 mg by mouth daily.     • diphenhydrAMINE-acetaminophen (TYLENOL PM)  MG tablet per tablet Take 1 tablet by mouth At Night As Needed for sleep.     • escitalopram (LEXAPRO) 20 MG tablet Take 1 tablet by mouth Daily. 90 tablet 3   • Glucose Blood (ONETOUCH ULTRA BLUE VI) daily.     • metoprolol tartrate (LOPRESSOR) 25 MG tablet Take 25 mg by mouth 2 (two) times a day.     • Multiple Vitamins-Minerals (ONE-A-DAY MENS 50+ ADVANTAGE PO) Take  by mouth daily.     • pantoprazole (PROTONIX) 40 MG EC tablet Take 1 tablet by mouth Daily. 90 tablet 3   • tamsulosin (FLOMAX) 0.4 MG capsule 24 hr capsule Take 1 capsule by mouth Daily. 90 capsule 1   • mupirocin (BACTROBAN) 2 % ointment Apply  topically 3 (Three) Times a Day. 15 g 1   • traMADol (ULTRAM) 50 MG  tablet Take 1 tablet by mouth 2 (Two) Times a Day As Needed for Moderate Pain . 60 tablet 3     No facility-administered medications prior to visit.        Patient Active Problem List   Diagnosis   • Anemia   • Chronic coronary artery disease   • Chronic osteoarthritis   • Depression   • Elevated prostate specific antigen (PSA)   • Enlarged prostate   • Hyperlipidemia   • Essential hypertension   • Iron deficiency   • Low back pain   • Malignant neoplasm of prostate   • Rheumatoid arthritis   • Prostate cancer       Advance Care Planning:  has an advance directive - a copy has been provided and is in file    Identification of Risk Factors:  Risk factors include: cardiovascular risk, increased fall risk and chronic pain.    Review of Systems   Constitutional: Negative.    HENT: Negative.    Eyes: Negative.    Respiratory: Negative.    Cardiovascular: Negative.    Gastrointestinal: Negative.    Endocrine: Negative.    Genitourinary: Negative.    Musculoskeletal: Positive for arthralgias and back pain.   Skin: Positive for wound.   Allergic/Immunologic: Negative.    Neurological: Negative.    Hematological: Negative.    Psychiatric/Behavioral: Negative.        Compared to one year ago, the patient feels his physical health is better.  Compared to one year ago, the patient feels his mental health is better.    Objective     Physical Exam   Constitutional: He is oriented to person, place, and time. He appears well-developed and well-nourished.   HENT:   Head: Normocephalic.   Mouth/Throat: Oropharynx is clear and moist.   Neck: Normal range of motion. Neck supple. No tracheal deviation present. No thyromegaly present.   Cardiovascular: Normal rate, regular rhythm, normal heart sounds and intact distal pulses.    No murmur heard.  Pulmonary/Chest: Effort normal and breath sounds normal.   Musculoskeletal: Normal range of motion. He exhibits no edema.   Neurological: He is alert and oriented to person, place, and time.  "  Skin: Skin is warm and dry.   Skin tears both arms not infected   Psychiatric: He has a normal mood and affect.   Vitals reviewed.      Vitals:    05/03/18 0924   BP: 153/70   BP Location: Right arm   Patient Position: Sitting   Cuff Size: Adult   Pulse: 63   Temp: 97.7 °F (36.5 °C)   TempSrc: Oral   SpO2: 98%   Weight: 82.1 kg (180 lb 14.4 oz)   Height: 175.3 cm (69.02\")       Patient's Body mass index is 26.7 kg/m². BMI is above normal parameters. Follow-up plan includes:  no follow-up required.      Assessment/Plan   Patient Self-Management and Personalized Health Advice  The patient has been provided with information about: diet, exercise, prevention of cardiac or vascular disease and fall prevention and preventive services including:   · Fall Risk plan of care done, Nutrition counseling provided, Pneumococcal vaccine .    Visit Diagnoses:    ICD-10-CM ICD-9-CM   1. Essential hypertension I10 401.9   2. Chronic osteoarthritis M19.90 715.90   3. Medicare annual wellness visit, initial Z00.00 V70.0   4. Abrasion of left upper extremity, initial encounter S40.812A 913.0       Orders Placed This Encounter   Procedures   • CBC & Differential     Order Specific Question:   Manual Differential     Answer:   No       Outpatient Encounter Prescriptions as of 5/3/2018   Medication Sig Dispense Refill   • aspirin 81 MG tablet Take  by mouth daily.     • atorvastatin (LIPITOR) 80 MG tablet Take 0.5 tablets by mouth Daily. 90 tablet 1   • clopidogrel (PLAVIX) 75 MG tablet Take 75 mg by mouth daily.     • diphenhydrAMINE-acetaminophen (TYLENOL PM)  MG tablet per tablet Take 1 tablet by mouth At Night As Needed for sleep.     • escitalopram (LEXAPRO) 20 MG tablet Take 1 tablet by mouth Daily. 90 tablet 3   • Glucose Blood (ONETOUCH ULTRA BLUE VI) daily.     • lisinopril (PRINIVIL,ZESTRIL) 10 MG tablet Take 1 tablet by mouth Daily. 90 tablet 3   • metoprolol tartrate (LOPRESSOR) 25 MG tablet Take 25 mg by mouth 2 (two) " times a day.     • Multiple Vitamins-Minerals (ONE-A-DAY MENS 50+ ADVANTAGE PO) Take  by mouth daily.     • mupirocin (BACTROBAN) 2 % ointment Apply  topically 3 (Three) Times a Day. 15 g 1   • pantoprazole (PROTONIX) 40 MG EC tablet Take 1 tablet by mouth Daily. 90 tablet 3   • tamsulosin (FLOMAX) 0.4 MG capsule 24 hr capsule Take 1 capsule by mouth Daily. 90 capsule 1   • traMADol (ULTRAM) 50 MG tablet Take 1 tablet by mouth Every 8 (Eight) Hours As Needed for Moderate Pain . 75 tablet 3   • [DISCONTINUED] lisinopril (PRINIVIL,ZESTRIL) 5 MG tablet      • [DISCONTINUED] mupirocin (BACTROBAN) 2 % ointment Apply  topically 3 (Three) Times a Day. 15 g 1   • [DISCONTINUED] traMADol (ULTRAM) 50 MG tablet Take 1 tablet by mouth 2 (Two) Times a Day As Needed for Moderate Pain . 60 tablet 3     Facility-Administered Encounter Medications as of 5/3/2018   Medication Dose Route Frequency Provider Last Rate Last Dose   • methylPREDNISolone acetate (DEPO-medrol) injection 40 mg  40 mg Intramuscular Once Chapin Sánchez MD       • methylPREDNISolone acetate (DEPO-medrol) injection 80 mg  80 mg Intramuscular Once Chapin Sánchez MD           Reviewed use of high risk medication in the elderly: yes  Reviewed for potential of harmful drug interactions in the elderly: yes    Follow Up:  Return in about 4 months (around 9/3/2018).   Overall you seem to be doing quite well.  Injection authorized for the chronic arthritis.  Medications renewed as noted.  Will make a increase dose of tramadol available if needed during aggressive summer activities.  Treat the skin tears as discussed.  Will adjust dosing on lisinopril as was started by cardiology.  Will notify with lab results.  Stay safely active.  Encourage use of walking stick or cane more often  Maintain heart healthy diet.  Recheck in 4 months or as needed.  As part of this patient's treatment plan I am prescribing controlled substances. The patient has been made  aware of appropriate use of such medications, including potential risk of somnolence, limited ability to drive and/or work safely, and potential for dependence or overdose. It has also been made clear that these medications are for use by this patient only, without concomitant use of alcohol or other substances unless prescribed.  Patient has completed prescribing agreement detailing terms of continued prescribing of controlled substances, including monitoring [facility] reports, urine drug screening, and pill counts if necessary. The patient is aware that inappropriate use will results in cessation of prescribing such medications.  [facility] report has been reviewed.  [facility] is updated every 3 months.  History and physical exam exhibit continued safe and appropriate use of controlled substances.     An After Visit Summary and PPPS with all of these plans were given to the patient.

## 2018-05-21 RX ORDER — TAMSULOSIN HYDROCHLORIDE 0.4 MG/1
CAPSULE ORAL
Qty: 90 CAPSULE | Refills: 1 | OUTPATIENT
Start: 2018-05-21

## 2018-06-01 RX ORDER — TAMSULOSIN HYDROCHLORIDE 0.4 MG/1
0.4 CAPSULE ORAL DAILY
Qty: 90 CAPSULE | Refills: 1 | Status: SHIPPED | OUTPATIENT
Start: 2018-06-01 | End: 2018-12-07 | Stop reason: SDUPTHER

## 2018-09-11 PROCEDURE — 84153 ASSAY OF PSA TOTAL: CPT | Performed by: UROLOGY

## 2018-09-13 ENCOUNTER — OFFICE VISIT (OUTPATIENT)
Dept: UROLOGY | Facility: CLINIC | Age: 83
End: 2018-09-13

## 2018-09-13 VITALS — BODY MASS INDEX: 26.66 KG/M2 | WEIGHT: 180 LBS | HEIGHT: 69 IN

## 2018-09-13 DIAGNOSIS — C61 PROSTATE CANCER (HCC): Primary | ICD-10-CM

## 2018-09-13 PROCEDURE — 99213 OFFICE O/P EST LOW 20 MIN: CPT | Performed by: UROLOGY

## 2018-09-13 NOTE — PROGRESS NOTES
Chief Complaint:          Prostate cancer    HPI:   82 y.o. male.  Pt had his cancer diagnosed in 2014 and he had 12/12 biopsies positive for carmita score of 7 stage T2b.  He had radiation therapy that finished up in 2015.  He had 2 years of lupron injections.  His last lupron injections was September in 2016 for 2 years of lupron.  Pt has very few hot flashes.  His psa this year is 0.02 and last year it was 0.03  HPI      Past Medical History:        Past Medical History:   Diagnosis Date   • Anemia    • Arthritis    • Depression    • Hyperlipidemia    • Hypertension    • Prostate cancer (CMS/HCC) 2013         Current Meds:     Current Outpatient Prescriptions   Medication Sig Dispense Refill   • aspirin 81 MG tablet Take  by mouth daily.     • atorvastatin (LIPITOR) 80 MG tablet Take 0.5 tablets by mouth Daily. 90 tablet 1   • clopidogrel (PLAVIX) 75 MG tablet Take 75 mg by mouth daily.     • diphenhydrAMINE-acetaminophen (TYLENOL PM)  MG tablet per tablet Take 1 tablet by mouth At Night As Needed for sleep.     • escitalopram (LEXAPRO) 20 MG tablet Take 1 tablet by mouth Daily. 90 tablet 3   • Glucose Blood (ONETOUCH ULTRA BLUE VI) daily.     • lisinopril (PRINIVIL,ZESTRIL) 10 MG tablet Take 1 tablet by mouth Daily. 90 tablet 3   • metoprolol tartrate (LOPRESSOR) 25 MG tablet Take 25 mg by mouth 2 (two) times a day.     • Multiple Vitamins-Minerals (ONE-A-DAY MENS 50+ ADVANTAGE PO) Take  by mouth daily.     • mupirocin (BACTROBAN) 2 % ointment Apply  topically 3 (Three) Times a Day. 15 g 1   • pantoprazole (PROTONIX) 40 MG EC tablet Take 1 tablet by mouth Daily. 90 tablet 3   • tamsulosin (FLOMAX) 0.4 MG capsule 24 hr capsule Take 1 capsule by mouth Daily. 90 capsule 1   • traMADol (ULTRAM) 50 MG tablet Take 1 tablet by mouth Every 8 (Eight) Hours As Needed for Moderate Pain . 75 tablet 3     No current facility-administered medications for this visit.         Allergies:      Allergies   Allergen Reactions    • Sulfa Antibiotics         Past Surgical History:     Past Surgical History:   Procedure Laterality Date   • CARDIAC SURGERY     • HERNIA REPAIR     • STOMACH SURGERY           Social History:     Social History     Social History   • Marital status:      Spouse name: N/A   • Number of children: N/A   • Years of education: N/A     Occupational History   • Not on file.     Social History Main Topics   • Smoking status: Former Smoker     Quit date: 7/18/1976   • Smokeless tobacco: Never Used   • Alcohol use No   • Drug use: No   • Sexual activity: Not on file     Other Topics Concern   • Not on file     Social History Narrative   • No narrative on file       Family History:     Family History   Problem Relation Age of Onset   • Cancer Mother 35        breast   • Heart attack Father    • Cancer Sister         cervical   • Heart attack Brother    • Heart disease Brother    • Cancer Brother         Prostate       Review of Systems:     Review of Systems   Constitutional: Negative for chills, fatigue and fever.   Respiratory: Negative for cough, shortness of breath and wheezing.    Cardiovascular: Negative for leg swelling.   Gastrointestinal: Negative for abdominal pain, nausea and vomiting.   Musculoskeletal: Negative for back pain and joint swelling.   Neurological: Negative for dizziness and headaches.   Psychiatric/Behavioral: Negative for confusion.       I have reviewed the follow portions of the patient's history and confirmed they are accurate today:  allergies, current medications, past family history, past medical history, past social history, past surgical history, problem list and ROS  Physical Exam:     Physical Exam   Constitutional: He is oriented to person, place, and time.   HENT:   Head: Normocephalic and atraumatic.   Right Ear: External ear normal.   Left Ear: External ear normal.   Nose: Nose normal.   Mouth/Throat: Oropharynx is clear and moist.   Eyes: Pupils are equal, round, and  "reactive to light. Conjunctivae and EOM are normal.   Neck: Normal range of motion. Neck supple. No thyromegaly present.   Cardiovascular: Normal rate, regular rhythm, normal heart sounds and intact distal pulses.    No murmur heard.  Pulmonary/Chest: Effort normal and breath sounds normal. No respiratory distress. He has no wheezes. He has no rales. He exhibits no tenderness.   Abdominal: Soft. Bowel sounds are normal. He exhibits no distension and no mass. There is no tenderness. No hernia.   Genitourinary: Rectum normal and penis normal.   Genitourinary Comments: Flat prostate   Musculoskeletal: Normal range of motion. He exhibits no edema or tenderness.   Lymphadenopathy:     He has no cervical adenopathy.   Neurological: He is alert and oriented to person, place, and time. No cranial nerve deficit. He exhibits normal muscle tone. Coordination normal.   Skin: Skin is warm. No rash noted.   Psychiatric: He has a normal mood and affect. His behavior is normal. Judgment and thought content normal.   Nursing note and vitals reviewed.      Ht 175.3 cm (69.02\")   Wt 81.6 kg (180 lb)   BMI 26.57 kg/m²    Procedure:         Assessment:     Encounter Diagnosis   Name Primary?   • Prostate cancer (CMS/HCC) Yes     No orders of the defined types were placed in this encounter.      Plan:   Will see pt back in a year with a psa    Patient's Body mass index is 26.57 kg/m². BMI is within normal parameters. No follow-up required.      Counseling was given to patient for the following topics diagnostic results including: psa. The interim medical history and current results were reviewed.  A treatment plan with follow-up was made for Prostate cancer (CMS/HCC) [C61]This document has been electronically signed by Ze Carey MD September 13, 2018 1:14 PM  "

## 2018-09-18 ENCOUNTER — OFFICE VISIT (OUTPATIENT)
Dept: FAMILY MEDICINE CLINIC | Facility: CLINIC | Age: 83
End: 2018-09-18

## 2018-09-18 VITALS
HEART RATE: 64 BPM | HEIGHT: 69 IN | WEIGHT: 182.1 LBS | BODY MASS INDEX: 26.97 KG/M2 | TEMPERATURE: 98 F | SYSTOLIC BLOOD PRESSURE: 128 MMHG | DIASTOLIC BLOOD PRESSURE: 61 MMHG

## 2018-09-18 DIAGNOSIS — E61.1 IRON DEFICIENCY: ICD-10-CM

## 2018-09-18 DIAGNOSIS — Z23 FLU VACCINE NEED: ICD-10-CM

## 2018-09-18 DIAGNOSIS — H00.014 HORDEOLUM EXTERNUM OF LEFT UPPER EYELID: ICD-10-CM

## 2018-09-18 DIAGNOSIS — I10 ESSENTIAL HYPERTENSION: Primary | ICD-10-CM

## 2018-09-18 DIAGNOSIS — M19.90 CHRONIC OSTEOARTHRITIS: ICD-10-CM

## 2018-09-18 LAB
ALBUMIN SERPL-MCNC: 4 G/DL (ref 3.4–4.8)
ALBUMIN/GLOB SERPL: 1.5 G/DL (ref 1.5–2.5)
ALP SERPL-CCNC: 54 U/L (ref 40–129)
ALT SERPL W P-5'-P-CCNC: 14 U/L (ref 10–44)
ANION GAP SERPL CALCULATED.3IONS-SCNC: 6.8 MMOL/L (ref 3.6–11.2)
AST SERPL-CCNC: 17 U/L (ref 10–34)
BASOPHILS # BLD AUTO: 0.05 10*3/MM3 (ref 0–0.3)
BASOPHILS NFR BLD AUTO: 0.6 % (ref 0–2)
BILIRUB SERPL-MCNC: 0.3 MG/DL (ref 0.2–1.8)
BUN BLD-MCNC: 22 MG/DL (ref 7–21)
BUN/CREAT SERPL: 15.6 (ref 7–25)
CALCIUM SPEC-SCNC: 9 MG/DL (ref 7.7–10)
CHLORIDE SERPL-SCNC: 105 MMOL/L (ref 99–112)
CO2 SERPL-SCNC: 28.2 MMOL/L (ref 24.3–31.9)
CREAT BLD-MCNC: 1.41 MG/DL (ref 0.43–1.29)
DEPRECATED RDW RBC AUTO: 45.6 FL (ref 37–54)
EOSINOPHIL # BLD AUTO: 0.42 10*3/MM3 (ref 0–0.7)
EOSINOPHIL NFR BLD AUTO: 5.1 % (ref 0–7)
ERYTHROCYTE [DISTWIDTH] IN BLOOD BY AUTOMATED COUNT: 13.7 % (ref 11.5–14.5)
GFR SERPL CREATININE-BSD FRML MDRD: 48 ML/MIN/1.73
GLOBULIN UR ELPH-MCNC: 2.7 GM/DL
GLUCOSE BLD-MCNC: 81 MG/DL (ref 70–110)
HCT VFR BLD AUTO: 30.9 % (ref 42–52)
HGB BLD-MCNC: 9.9 G/DL (ref 14–18)
IMM GRANULOCYTES # BLD: 0.01 10*3/MM3 (ref 0–0.03)
IMM GRANULOCYTES NFR BLD: 0.1 % (ref 0–0.5)
LYMPHOCYTES # BLD AUTO: 1.81 10*3/MM3 (ref 1–3)
LYMPHOCYTES NFR BLD AUTO: 22.2 % (ref 16–46)
MCH RBC QN AUTO: 30.5 PG (ref 27–33)
MCHC RBC AUTO-ENTMCNC: 32 G/DL (ref 33–37)
MCV RBC AUTO: 95.1 FL (ref 80–94)
MONOCYTES # BLD AUTO: 0.87 10*3/MM3 (ref 0.1–0.9)
MONOCYTES NFR BLD AUTO: 10.6 % (ref 0–12)
NEUTROPHILS # BLD AUTO: 5.01 10*3/MM3 (ref 1.4–6.5)
NEUTROPHILS NFR BLD AUTO: 61.4 % (ref 40–75)
OSMOLALITY SERPL CALC.SUM OF ELEC: 281.8 MOSM/KG (ref 273–305)
PLATELET # BLD AUTO: 218 10*3/MM3 (ref 130–400)
PMV BLD AUTO: 11.2 FL (ref 6–10)
POTASSIUM BLD-SCNC: 4.6 MMOL/L (ref 3.5–5.3)
PROT SERPL-MCNC: 6.7 G/DL (ref 6–8)
RBC # BLD AUTO: 3.25 10*6/MM3 (ref 4.7–6.1)
SODIUM BLD-SCNC: 140 MMOL/L (ref 135–153)
WBC NRBC COR # BLD: 8.17 10*3/MM3 (ref 4.5–12.5)

## 2018-09-18 PROCEDURE — 99214 OFFICE O/P EST MOD 30 MIN: CPT | Performed by: FAMILY MEDICINE

## 2018-09-18 PROCEDURE — 96372 THER/PROPH/DIAG INJ SC/IM: CPT | Performed by: FAMILY MEDICINE

## 2018-09-18 PROCEDURE — 36415 COLL VENOUS BLD VENIPUNCTURE: CPT | Performed by: FAMILY MEDICINE

## 2018-09-18 PROCEDURE — G0008 ADMIN INFLUENZA VIRUS VAC: HCPCS | Performed by: FAMILY MEDICINE

## 2018-09-18 PROCEDURE — 90662 IIV NO PRSV INCREASED AG IM: CPT | Performed by: FAMILY MEDICINE

## 2018-09-18 PROCEDURE — 85025 COMPLETE CBC W/AUTO DIFF WBC: CPT | Performed by: FAMILY MEDICINE

## 2018-09-18 PROCEDURE — 80053 COMPREHEN METABOLIC PANEL: CPT | Performed by: FAMILY MEDICINE

## 2018-09-18 RX ORDER — METHYLPREDNISOLONE ACETATE 80 MG/ML
80 INJECTION, SUSPENSION INTRA-ARTICULAR; INTRALESIONAL; INTRAMUSCULAR; SOFT TISSUE ONCE
Status: COMPLETED | OUTPATIENT
Start: 2018-09-18 | End: 2018-09-18

## 2018-09-18 RX ORDER — TOBRAMYCIN 3 MG/ML
1 SOLUTION/ DROPS OPHTHALMIC EVERY 6 HOURS SCHEDULED
Qty: 5 ML | Refills: 0 | Status: SHIPPED | OUTPATIENT
Start: 2018-09-18 | End: 2018-11-12

## 2018-09-18 RX ORDER — METHYLPREDNISOLONE ACETATE 40 MG/ML
40 INJECTION, SUSPENSION INTRA-ARTICULAR; INTRALESIONAL; INTRAMUSCULAR; SOFT TISSUE ONCE
Status: COMPLETED | OUTPATIENT
Start: 2018-09-18 | End: 2018-09-18

## 2018-09-18 RX ORDER — ATORVASTATIN CALCIUM 80 MG/1
40 TABLET, FILM COATED ORAL DAILY
Qty: 90 TABLET | Refills: 1 | Status: SHIPPED | OUTPATIENT
Start: 2018-09-18 | End: 2019-01-14 | Stop reason: SDUPTHER

## 2018-09-18 RX ADMIN — METHYLPREDNISOLONE ACETATE 80 MG: 80 INJECTION, SUSPENSION INTRA-ARTICULAR; INTRALESIONAL; INTRAMUSCULAR; SOFT TISSUE at 15:08

## 2018-09-18 RX ADMIN — METHYLPREDNISOLONE ACETATE 40 MG: 40 INJECTION, SUSPENSION INTRA-ARTICULAR; INTRALESIONAL; INTRAMUSCULAR; SOFT TISSUE at 15:08

## 2018-09-18 NOTE — PROGRESS NOTES
Subjective   Rubén Rivas is a 82 y.o. male.     History of Present Illness follow-up regarding hypertension arthritis mild anemia.  Globally is doing well.  Maintains visit with cardiology.  Had recent good checkup with urology regarding the prostate CA his store cool.  Compliant with medications although utilizing iron about 3 days weekly.  Still with some mobility challenges.  Denies CP SOB palpitations GI  symptoms.  Energy good.  Sleep patterns good.  Appetite good.  No bruising.  No bleeding noted.  Denies headaches or dizziness.  Irritation to left eyelid    The following portions of the patient's history were reviewed and updated as appropriate: allergies, past family history, past medical history, past social history, past surgical history and problem list.    Review of Systems see the history of present illness    Objective   Physical Exam   Constitutional: He is oriented to person, place, and time. He appears well-developed and well-nourished.   HENT:   Head: Normocephalic.   Mouth/Throat: Oropharynx is clear and moist.   Eyes: Pupils are equal, round, and reactive to light. EOM are normal.   Has slightly swollen left upper lid with external stye   Neck: Normal range of motion. Neck supple. No tracheal deviation present. No thyromegaly present.   Cardiovascular: Normal rate, regular rhythm and normal heart sounds.    No murmur heard.  Pulmonary/Chest: Effort normal and breath sounds normal.   Musculoskeletal: He exhibits no edema.   Neurological: He is alert and oriented to person, place, and time.   Skin: Skin is warm and dry.   Psychiatric: He has a normal mood and affect.   Vitals reviewed.      Assessment/Plan   Rubén was seen today for essential hypertension.    Diagnoses and all orders for this visit:    Essential hypertension  -     CBC & Differential  -     Comprehensive Metabolic Panel  -     CBC Auto Differential    Flu vaccine need  -     Flu Vaccine High Dose PF 65YR+ ([id number])    Iron  deficiency  -     CBC & Differential  -     CBC Auto Differential    Chronic osteoarthritis  -     CBC & Differential  -     methylPREDNISolone acetate (DEPO-medrol) injection 80 mg; Inject 1 mL into the appropriate muscle as directed by prescriber 1 (One) Time.  -     methylPREDNISolone acetate (DEPO-medrol) injection 40 mg; Inject 1 mL into the appropriate muscle as directed by prescriber 1 (One) Time.  -     CBC Auto Differential    Hordeolum externum of left upper eyelid  -     tobramycin 0.3 % solution ophthalmic solution; Administer 1 drop into the left eye Every 6 (Six) Hours.    Other orders  -     atorvastatin (LIPITOR) 80 MG tablet; Take 0.5 tablets by mouth Daily.     Meds as directed.  Stay safely active.  Injections authorized.  Use the topical for the stye.  Warm compresses for the left eye also.  Recheck in a few months routinely.  I continue to be concerned about the mild anemia.      Patient's Body mass index is 26.88 kg/m². BMI is above normal parameters. Recommendations include: exercise counseling and nutrition counseling.

## 2018-09-19 DIAGNOSIS — D64.9 ANEMIA, UNSPECIFIED TYPE: ICD-10-CM

## 2018-09-19 DIAGNOSIS — E61.1 IRON DEFICIENCY: Primary | ICD-10-CM

## 2018-09-19 DIAGNOSIS — M19.90 CHRONIC OSTEOARTHRITIS: ICD-10-CM

## 2018-09-19 RX ORDER — TRAMADOL HYDROCHLORIDE 50 MG/1
50 TABLET ORAL EVERY 8 HOURS PRN
Qty: 75 TABLET | Refills: 3 | OUTPATIENT
Start: 2018-09-19 | End: 2019-04-15 | Stop reason: SDUPTHER

## 2018-09-19 NOTE — PROGRESS NOTES
The lab testing is stable but unfortunately the hemoglobin is staying low.  I want him to see hematology.  He had colonoscopy 3 years ago.  If we can get his blood be back up he will feel even better than he does now.  Encourage him to go.

## 2018-09-19 NOTE — TELEPHONE ENCOUNTER
NATALIA CALLED AND STATES PATIENT DIDN'T GET HIS PRESCRIPTION FOR TRAMADOL WHILE AT HIS OFFICE VISIT ON 9/18/18

## 2018-10-04 ENCOUNTER — EPISODE CHANGES (OUTPATIENT)
Dept: CASE MANAGEMENT | Facility: OTHER | Age: 83
End: 2018-10-04

## 2018-10-15 ENCOUNTER — CONSULT (OUTPATIENT)
Dept: ONCOLOGY | Facility: CLINIC | Age: 83
End: 2018-10-15

## 2018-10-15 VITALS
BODY MASS INDEX: 25.05 KG/M2 | SYSTOLIC BLOOD PRESSURE: 142 MMHG | WEIGHT: 175 LBS | HEIGHT: 70 IN | DIASTOLIC BLOOD PRESSURE: 67 MMHG | OXYGEN SATURATION: 98 % | HEART RATE: 89 BPM | RESPIRATION RATE: 18 BRPM | TEMPERATURE: 98.7 F

## 2018-10-15 DIAGNOSIS — D64.9 NORMOCYTIC ANEMIA: Primary | ICD-10-CM

## 2018-10-15 LAB
BASOPHILS # BLD AUTO: 0.04 10*3/MM3 (ref 0–0.3)
BASOPHILS NFR BLD AUTO: 0.6 % (ref 0–2)
DEPRECATED RDW RBC AUTO: 49.4 FL (ref 37–54)
EOSINOPHIL # BLD AUTO: 0.23 10*3/MM3 (ref 0–0.7)
EOSINOPHIL NFR BLD AUTO: 3.3 % (ref 0–7)
ERYTHROCYTE [DISTWIDTH] IN BLOOD BY AUTOMATED COUNT: 14.7 % (ref 11.5–14.5)
ERYTHROCYTE [SEDIMENTATION RATE] IN BLOOD: 29 MM/HR (ref 0–20)
FERRITIN SERPL-MCNC: 256 NG/ML (ref 21.9–321.7)
FOLATE SERPL-MCNC: >24 NG/ML (ref 5.4–20)
HCT VFR BLD AUTO: 32.3 % (ref 42–52)
HGB BLD-MCNC: 10.3 G/DL (ref 14–18)
IMM GRANULOCYTES # BLD: 0.02 10*3/MM3 (ref 0–0.03)
IMM GRANULOCYTES NFR BLD: 0.3 % (ref 0–0.5)
IRON 24H UR-MRATE: 66 MCG/DL (ref 53–167)
IRON SATN MFR SERPL: 25 % (ref 20–50)
LDH SERPL-CCNC: 174 U/L (ref 135–225)
LYMPHOCYTES # BLD AUTO: 1.92 10*3/MM3 (ref 1–3)
LYMPHOCYTES NFR BLD AUTO: 27.7 % (ref 16–46)
MCH RBC QN AUTO: 29.5 PG (ref 27–33)
MCHC RBC AUTO-ENTMCNC: 31.9 G/DL (ref 33–37)
MCV RBC AUTO: 92.6 FL (ref 80–94)
MONOCYTES # BLD AUTO: 0.52 10*3/MM3 (ref 0.1–0.9)
MONOCYTES NFR BLD AUTO: 7.5 % (ref 0–12)
NEUTROPHILS # BLD AUTO: 4.2 10*3/MM3 (ref 1.4–6.5)
NEUTROPHILS NFR BLD AUTO: 60.6 % (ref 40–75)
PLATELET # BLD AUTO: 238 10*3/MM3 (ref 130–400)
PMV BLD AUTO: 10.4 FL (ref 6–10)
RBC # BLD AUTO: 3.49 10*6/MM3 (ref 4.7–6.1)
RETICS #: 0.05 10*6/MM3 (ref 0.02–0.13)
RETICS/RBC NFR AUTO: 1.52 % (ref 0.5–2)
TIBC SERPL-MCNC: 264 MCG/DL (ref 241–421)
VIT B12 BLD-MCNC: 1369 PG/ML (ref 211–911)
WBC NRBC COR # BLD: 6.93 10*3/MM3 (ref 4.5–12.5)

## 2018-10-15 PROCEDURE — 85025 COMPLETE CBC W/AUTO DIFF WBC: CPT | Performed by: INTERNAL MEDICINE

## 2018-10-15 PROCEDURE — 85045 AUTOMATED RETICULOCYTE COUNT: CPT | Performed by: INTERNAL MEDICINE

## 2018-10-15 PROCEDURE — 82607 VITAMIN B-12: CPT | Performed by: INTERNAL MEDICINE

## 2018-10-15 PROCEDURE — 85060 BLOOD SMEAR INTERPRETATION: CPT | Performed by: INTERNAL MEDICINE

## 2018-10-15 PROCEDURE — 82746 ASSAY OF FOLIC ACID SERUM: CPT | Performed by: INTERNAL MEDICINE

## 2018-10-15 PROCEDURE — 99204 OFFICE O/P NEW MOD 45 MIN: CPT | Performed by: INTERNAL MEDICINE

## 2018-10-15 PROCEDURE — 83010 ASSAY OF HAPTOGLOBIN QUANT: CPT | Performed by: INTERNAL MEDICINE

## 2018-10-15 PROCEDURE — 83540 ASSAY OF IRON: CPT | Performed by: INTERNAL MEDICINE

## 2018-10-15 PROCEDURE — 82728 ASSAY OF FERRITIN: CPT | Performed by: INTERNAL MEDICINE

## 2018-10-15 PROCEDURE — 85652 RBC SED RATE AUTOMATED: CPT | Performed by: INTERNAL MEDICINE

## 2018-10-15 PROCEDURE — 83615 LACTATE (LD) (LDH) ENZYME: CPT | Performed by: INTERNAL MEDICINE

## 2018-10-15 PROCEDURE — 83550 IRON BINDING TEST: CPT | Performed by: INTERNAL MEDICINE

## 2018-10-15 NOTE — PROGRESS NOTES
"Rubén Rivas  [id number]  1935  10/15/2018      Referring Provider:   Chapin Sánchez MD    Reason for Consultation:   Normocytic anemia    Chief Complaint:   Normocytic anemia      History of Present Illness   Rubén Rivas is a very pleasant 83 y.o.  male who presents in new consultation at the request of Dr. Chapin Sánchez for further management and evaluation of normocytic anemia.     Mr. Rivas reports a history significant for anemia since he underwent cardiac bypass surgery in 2012. He also underwent an colonoscopy two years ago with Dr. Cooley in [place], [place] and as per patient was reportedly normal. He does have a past medical history significant for prostate cancer that is being followed by Dr. Carey and was treated with radiation treatments as well as Lupron injections for 6 months and has not had a recurrence since. He has a history of a \"bleeding ulcer\" 32 years ago that was treated by surgery with no recurrence in symptoms. He denies of any abnormal or spontaneous bleeding. He has been on oral ferrous sulfate 325mg once daily for the last six years and has been unable to tolerate a dose increase as this causes diarrhea. In review of his complete blood counts within out system it appears that he has had a mild anemia since 2014 which became more apparent in 2016 and has remained stable with a Hb in the 9-10 range since.      The following portions of the patient's history were reviewed and updated as appropriate: allergies, current medications, past family history, past medical history, past social history, past surgical history and problem list.    Allergies   Allergen Reactions   • Sulfa Antibiotics        Past Medical History:   Diagnosis Date   • Anemia    • Arthritis    • Depression    • Hyperlipidemia    • Hypertension    • Prostate cancer (CMS/[facility]) 2013   CAD  Bleeding gastric ulcer - 32 years ago    Past Surgical History:   Procedure Laterality Date   • CARDIAC SURGERY     • " HERNIA REPAIR     • STOMACH SURGERY         Social History     Social History   • Marital status:      Spouse name: N/A   • Number of children: N/A   • Years of education: N/A     Occupational History   • Not on file.     Social History Main Topics   • Smoking status: Former Smoker     Types: Cigarettes     Quit date: 1976   • Smokeless tobacco: Never Used   • Alcohol use No   • Drug use: No   • Sexual activity: Not on file     Other Topics Concern   • Not on file     Social History Narrative   • No narrative on file   He is currently  for the last 60+ years and they have one daughter who is healthy. He his retired and previously worked in a machine factory. Denies of any tobacco, alcohol, or illicit drug use.     Family History   Problem Relation Age of Onset   • Cancer Mother 35        breast   • Heart attack Father    • Cancer Sister         cervical   • Heart attack Brother    • Heart disease Brother    • Cancer Brother         Prostate   Daughter - Rheumatoid arthritis  Sister - Colon cancer and cervical -  at 62 from malignancy   Sister - Breast cancer - alive  Mother - Breast cancer    Brother - Prostate cancer diagnosed at 51         Current Outpatient Prescriptions:   •  aspirin 81 MG tablet, Take  by mouth daily., Disp: , Rfl:   •  atorvastatin (LIPITOR) 80 MG tablet, Take 0.5 tablets by mouth Daily., Disp: 90 tablet, Rfl: 1  •  clopidogrel (PLAVIX) 75 MG tablet, Take 75 mg by mouth daily., Disp: , Rfl:   •  diphenhydrAMINE-acetaminophen (TYLENOL PM)  MG tablet per tablet, Take 1 tablet by mouth At Night As Needed for sleep., Disp: , Rfl:   •  escitalopram (LEXAPRO) 20 MG tablet, Take 1 tablet by mouth Daily., Disp: 90 tablet, Rfl: 3  •  Glucose Blood (ONETOUCH ULTRA BLUE VI), daily., Disp: , Rfl:   •  lisinopril (PRINIVIL,ZESTRIL) 10 MG tablet, Take 1 tablet by mouth Daily., Disp: 90 tablet, Rfl: 3  •  metoprolol tartrate (LOPRESSOR) 25 MG tablet, Take 25 mg by  mouth 2 (two) times a day., Disp: , Rfl:   •  Multiple Vitamins-Minerals (ONE-A-DAY MENS 50+ ADVANTAGE PO), Take  by mouth daily., Disp: , Rfl:   •  mupirocin (BACTROBAN) 2 % ointment, Apply  topically 3 (Three) Times a Day., Disp: 15 g, Rfl: 1  •  pantoprazole (PROTONIX) 40 MG EC tablet, Take 1 tablet by mouth Daily., Disp: 90 tablet, Rfl: 3  •  tamsulosin (FLOMAX) 0.4 MG capsule 24 hr capsule, Take 1 capsule by mouth Daily., Disp: 90 capsule, Rfl: 1  •  tobramycin 0.3 % solution ophthalmic solution, Administer 1 drop into the left eye Every 6 (Six) Hours., Disp: 5 mL, Rfl: 0  •  traMADol (ULTRAM) 50 MG tablet, Take 1 tablet by mouth Every 8 (Eight) Hours As Needed for Moderate Pain ., Disp: 75 tablet, Rfl: 3      Review of Systems  Constitutional: No fever, chills, night sweats since prostate issues, no weight loss.   HEENT:  No headaches, vision changes or hearing changes, no sinus drainage, sore throat.   Cardiovascular:  No palpitations, chest pain, syncopal episodes or edema.   Pulmonary:  No shortness of breath, hemoptysis, or cough.   Gastrointestinal:  No nausea or vomiting. No constipation or diarrhea (unless with increased dose of iron). No change in appetite. No abdominal pain. No melena or hematochezia.   Genitourinary:  No hematuria, or changes in urination.   Musculoskeletal:  No pain, or joint problems. Has hip pain  Skin: No rashes or pruritus.   Endocrine:  Occasional hot flashes, no chills   Hematologic:  No history of free bleeding, spontaneous bleeding or clotting problems. No lymphadenopathy. Has easy bruising  Immunologic:  No allergies or frequent infections.   Neurologic: No numbness, tingling, or weakness.   Psychiatric:  No anxiety or depression.       Physical Exam  Vital Signs: These were reviewed and listed as per patient’s electronic medical chart  Vitals:    10/15/18 1340   BP: 142/67   Pulse: 89   Resp: 18   Temp: 98.7 °F (37.1 °C)   SpO2: 98%     General: Awake, alert and oriented,  in no distress  HEENT: Head is atraumatic, normocephalic, extraocular movements full, oropharynx clear, no scleral icterus, pink moist mucous membranes  Neck: supple, no jvd, lymphadenopathy or masses  Cardiovascular: regular rate and rhythm without murmurs, rubs or gallops  Pulmonary: non-labored, clear to auscultation bilaterally, no wheezing  Abdomen: soft, non-tender, non-distended, normal active bowel sounds present  Extremities: No clubbing, cyanosis or edema  Lymph: No cervical, supraclavicular, axillary, adenopathy  Neurologic: Mental status as above, alert, awake and oriented, grossly non-focal exam  Skin: warm, dry, intact, chronic skin lesions, ecchymosis on upper extremities  MSK: mobilizes with a cane        Labs / Studies:    Office Visit on 09/18/2018   Component Date Value   • Glucose 09/18/2018 81    • BUN 09/18/2018 22*   • Creatinine 09/18/2018 1.41*   • Sodium 09/18/2018 140    • Potassium 09/18/2018 4.6    • Chloride 09/18/2018 105    • CO2 09/18/2018 28.2    • Calcium 09/18/2018 9.0    • Total Protein 09/18/2018 6.7    • Albumin 09/18/2018 4.00    • ALT (SGPT) 09/18/2018 14    • AST (SGOT) 09/18/2018 17    • Alkaline Phosphatase 09/18/2018 54    • Total Bilirubin 09/18/2018 0.3    • eGFR Non  Amer 09/18/2018 48*   • Globulin 09/18/2018 2.7    • A/G Ratio 09/18/2018 1.5    • BUN/Creatinine Ratio 09/18/2018 15.6    • Anion Gap 09/18/2018 6.8    • WBC 09/18/2018 8.17    • RBC 09/18/2018 3.25*   • Hemoglobin 09/18/2018 9.9*   • Hematocrit 09/18/2018 30.9*   • MCV 09/18/2018 95.1*   • MCH 09/18/2018 30.5    • MCHC 09/18/2018 32.0*   • RDW 09/18/2018 13.7    • RDW-SD 09/18/2018 45.6    • MPV 09/18/2018 11.2*   • Platelets 09/18/2018 218    • Neutrophil % 09/18/2018 61.4    • Lymphocyte % 09/18/2018 22.2    • Monocyte % 09/18/2018 10.6    • Eosinophil % 09/18/2018 5.1    • Basophil % 09/18/2018 0.6    • Immature Grans % 09/18/2018 0.1    • Neutrophils, Absolute 09/18/2018 5.01    • Lymphocytes,  Absolute 09/18/2018 1.81    • Monocytes, Absolute 09/18/2018 0.87    • Eosinophils, Absolute 09/18/2018 0.42    • Basophils, Absolute 09/18/2018 0.05    • Immature Grans, Absolute 09/18/2018 0.01    • Osmolality Calc 09/18/2018 281.8    Appointment on 09/11/2018   Component Date Value   • PSA 09/11/2018 0.020    Office Visit on 05/03/2018   Component Date Value   • WBC 05/03/2018 7.54    • RBC 05/03/2018 3.20*   • Hemoglobin 05/03/2018 9.8*   • Hematocrit 05/03/2018 30.5*   • MCV 05/03/2018 95.3*   • MCH 05/03/2018 30.6    • MCHC 05/03/2018 32.1*   • RDW 05/03/2018 15.2*   • RDW-SD 05/03/2018 50.2    • MPV 05/03/2018 11.0*   • Platelets 05/03/2018 234    • Neutrophil % 05/03/2018 55.6    • Lymphocyte % 05/03/2018 23.6    • Monocyte % 05/03/2018 11.8    • Eosinophil % 05/03/2018 8.1*   • Basophil % 05/03/2018 0.8    • Immature Grans % 05/03/2018 0.1    • Neutrophils, Absolute 05/03/2018 4.19    • Lymphocytes, Absolute 05/03/2018 1.78    • Monocytes, Absolute 05/03/2018 0.89    • Eosinophils, Absolute 05/03/2018 0.61    • Basophils, Absolute 05/03/2018 0.06    • Immature Grans, Absolute 05/03/2018 0.01         Assessment/Plan   Rubén Rivas is a very pleasant 83 y.o.  male who presents in new consultation at the request of Dr. Chapin Sánchez for further management and evaluation of normocytic anemia.     Normocytic anemia  - I spent a good amount of time during the appointment discussing the possible differential diagnosis of anemia (which is quite vast) as well as possible prognosis and management of differential.  - Complete blood count today shows a slightly improved hemoglobin (overall stable) with peripheral smear pending.  - Iron panel is within normal limits and B12 and folate show repletion and in fact elevated.   - LDH and reticulocyte count are within normal limits and thus does not appear to be hemolysis, with haptoglobin pending.  - ESR is elevated indicating underlying inflammation that may be  causing bone marrow suppression contributing to his anemia.   - Pending results of his peripheral smear, will also obtain an SPEP and serum free light chains to further evaluate.  - He likely has anemia of chronic disease versus the anemia of the elderly and I have also discussed pursuing bone marrow biopsy should above workup remain unremarkable to also evaluate for the possibility of an underlying hematological disorder.    ACO Quality measures  - Rubén Rivas does not use tobacco products.  - Patient's Body mass index is 25.11 kg/m². BMI is slightly above normal parameters. Recommendations include: referral to primary care.  - Current outpatient and discharge medications have been reconciled for the patient.  Reviewed by: Sera Sorenson MD      I will have the patient return in follow up appointment to review test results in one month. He understands that should he have any questions or concerns prior to his appointment he should give us a call at any time and I would be happy to see him sooner. It was a pleasure to see this patient in clinic today, thank you for allowing me to participate in the care of this patient.      Sera Sorenson MD  10/15/2018  1:56 PM

## 2018-10-17 LAB
CYTOLOGIST CVX/VAG CYTO: NORMAL
HAPTOGLOB SERPL-MCNC: 150 MG/DL (ref 34–200)
PATH INTERP BLD-IMP: NORMAL

## 2018-10-22 DIAGNOSIS — M19.90 CHRONIC OSTEOARTHRITIS: ICD-10-CM

## 2018-10-22 DIAGNOSIS — D64.9 ANEMIA, UNSPECIFIED TYPE: ICD-10-CM

## 2018-10-23 RX ORDER — PANTOPRAZOLE SODIUM 40 MG/1
TABLET, DELAYED RELEASE ORAL
Qty: 90 TABLET | Refills: 3 | OUTPATIENT
Start: 2018-10-23

## 2018-10-23 RX ORDER — TAMSULOSIN HYDROCHLORIDE 0.4 MG/1
CAPSULE ORAL
Qty: 90 CAPSULE | Refills: 1 | OUTPATIENT
Start: 2018-10-23

## 2018-11-12 ENCOUNTER — OFFICE VISIT (OUTPATIENT)
Dept: ONCOLOGY | Facility: CLINIC | Age: 83
End: 2018-11-12

## 2018-11-12 VITALS
HEART RATE: 61 BPM | BODY MASS INDEX: 25.45 KG/M2 | RESPIRATION RATE: 18 BRPM | OXYGEN SATURATION: 98 % | DIASTOLIC BLOOD PRESSURE: 72 MMHG | WEIGHT: 177.4 LBS | TEMPERATURE: 98.5 F | SYSTOLIC BLOOD PRESSURE: 126 MMHG

## 2018-11-12 DIAGNOSIS — D64.9 NORMOCYTIC ANEMIA: Primary | ICD-10-CM

## 2018-11-12 DIAGNOSIS — C61 PROSTATE CANCER (HCC): ICD-10-CM

## 2018-11-12 LAB
BASOPHILS # BLD AUTO: 0.05 10*3/MM3 (ref 0–0.3)
BASOPHILS NFR BLD AUTO: 0.8 % (ref 0–2)
DEPRECATED RDW RBC AUTO: 53.6 FL (ref 37–54)
EOSINOPHIL # BLD AUTO: 0.34 10*3/MM3 (ref 0–0.7)
EOSINOPHIL NFR BLD AUTO: 5.2 % (ref 0–7)
ERYTHROCYTE [DISTWIDTH] IN BLOOD BY AUTOMATED COUNT: 15.7 % (ref 11.5–14.5)
HCT VFR BLD AUTO: 31.7 % (ref 42–52)
HGB BLD-MCNC: 10.1 G/DL (ref 14–18)
IMM GRANULOCYTES # BLD: 0.01 10*3/MM3 (ref 0–0.03)
IMM GRANULOCYTES NFR BLD: 0.2 % (ref 0–0.5)
LYMPHOCYTES # BLD AUTO: 1.5 10*3/MM3 (ref 1–3)
LYMPHOCYTES NFR BLD AUTO: 23.1 % (ref 16–46)
MCH RBC QN AUTO: 29.7 PG (ref 27–33)
MCHC RBC AUTO-ENTMCNC: 31.9 G/DL (ref 33–37)
MCV RBC AUTO: 93.2 FL (ref 80–94)
MONOCYTES # BLD AUTO: 0.63 10*3/MM3 (ref 0.1–0.9)
MONOCYTES NFR BLD AUTO: 9.7 % (ref 0–12)
NEUTROPHILS # BLD AUTO: 3.96 10*3/MM3 (ref 1.4–6.5)
NEUTROPHILS NFR BLD AUTO: 61 % (ref 40–75)
PLATELET # BLD AUTO: 227 10*3/MM3 (ref 130–400)
PMV BLD AUTO: 10.5 FL (ref 6–10)
RBC # BLD AUTO: 3.4 10*6/MM3 (ref 4.7–6.1)
WBC NRBC COR # BLD: 6.49 10*3/MM3 (ref 4.5–12.5)

## 2018-11-12 PROCEDURE — 36415 COLL VENOUS BLD VENIPUNCTURE: CPT | Performed by: INTERNAL MEDICINE

## 2018-11-12 PROCEDURE — 99214 OFFICE O/P EST MOD 30 MIN: CPT | Performed by: INTERNAL MEDICINE

## 2018-11-12 PROCEDURE — 83883 ASSAY NEPHELOMETRY NOT SPEC: CPT | Performed by: INTERNAL MEDICINE

## 2018-11-12 PROCEDURE — 84165 PROTEIN E-PHORESIS SERUM: CPT | Performed by: INTERNAL MEDICINE

## 2018-11-12 PROCEDURE — 85025 COMPLETE CBC W/AUTO DIFF WBC: CPT | Performed by: INTERNAL MEDICINE

## 2018-11-12 PROCEDURE — 86334 IMMUNOFIX E-PHORESIS SERUM: CPT | Performed by: INTERNAL MEDICINE

## 2018-11-12 PROCEDURE — 84155 ASSAY OF PROTEIN SERUM: CPT | Performed by: INTERNAL MEDICINE

## 2018-11-12 PROCEDURE — 82784 ASSAY IGA/IGD/IGG/IGM EACH: CPT | Performed by: INTERNAL MEDICINE

## 2018-11-12 NOTE — PROGRESS NOTES
"Rubén Rivas  [id number]  1935  11/12/2018      Referring Provider:   Chapin Sánchez MD    Reason for Follow Up:   Normocytic anemia    Chief Complaint:   Normocytic anemia      History of Present Illness   Rubén Rivas is a very pleasant 83 y.o.  male who presents in follow up appointment at the request of Dr. Chapin Sánchez for further management and evaluation of normocytic anemia.     Mr. Rivas reports a history significant for anemia since he underwent cardiac bypass surgery in 2012. He also underwent an colonoscopy two years ago with Dr. Cooley in [place], [place] and as per patient was reportedly normal. He does have a past medical history significant for prostate cancer that is being followed by Dr. Carey and was treated with radiation treatments as well as Lupron injections for 6 months and has not had a recurrence since. He has a history of a \"bleeding ulcer\" 32 years ago that was treated by surgery with no recurrence in symptoms. He denies of any abnormal or spontaneous bleeding. He has been on oral ferrous sulfate 325mg once daily for the last six years and has been unable to tolerate a dose increase as this causes diarrhea. In review of his complete blood counts within out system it appears that he has had a mild anemia since 2014 which became more apparent in 2016 and has remained stable with a Hb in the 9-10 range since.    Interim History:  Since his last visit his daughter who presents with him today reports that he discontinued oral iron due to constipation and diarrhea. He ptherwise denies of any other changes since his last visit with me. He denies of any blood loss.     The following portions of the patient's history were reviewed and updated as appropriate: allergies, current medications, past family history, past medical history, past social history, past surgical history and problem list.    Allergies   Allergen Reactions   • Sulfa Antibiotics        Past Medical History: "   Diagnosis Date   • Anemia    • Arthritis    • Depression    • Hyperlipidemia    • Hypertension    • Prostate cancer (CMS/[facility]) 2013   CAD  Bleeding gastric ulcer - 32 years ago    Past Surgical History:   Procedure Laterality Date   • CARDIAC SURGERY     • HERNIA REPAIR     • STOMACH SURGERY         Social History     Socioeconomic History   • Marital status:      Spouse name: Not on file   • Number of children: Not on file   • Years of education: Not on file   • Highest education level: Not on file   Social Needs   • Financial resource strain: Not on file   • Food insecurity - worry: Not on file   • Food insecurity - inability: Not on file   • Transportation needs - medical: Not on file   • Transportation needs - non-medical: Not on file   Occupational History   • Not on file   Tobacco Use   • Smoking status: Former Smoker     Types: Cigarettes     Last attempt to quit: 1976     Years since quittin.3   • Smokeless tobacco: Never Used   Substance and Sexual Activity   • Alcohol use: No   • Drug use: No   • Sexual activity: Not on file   Other Topics Concern   • Not on file   Social History Narrative   • Not on file   He is currently  for the last 60+ years and they have one daughter who is healthy. He his retired and previously worked in a machine factory. Denies of any tobacco, alcohol, or illicit drug use.     Family History   Problem Relation Age of Onset   • Cancer Mother 35        breast   • Heart attack Father    • Cancer Sister         cervical   • Heart attack Brother    • Heart disease Brother    • Cancer Brother         Prostate   Daughter - Rheumatoid arthritis  Sister - Colon cancer and cervical -  at 62 from malignancy   Sister - Breast cancer - alive  Mother - Breast cancer    Brother - Prostate cancer diagnosed at 51         Current Outpatient Medications:   •  aspirin 81 MG tablet, Take  by mouth daily., Disp: , Rfl:   •  atorvastatin (LIPITOR) 80 MG tablet,  Take 0.5 tablets by mouth Daily., Disp: 90 tablet, Rfl: 1  •  clopidogrel (PLAVIX) 75 MG tablet, Take 75 mg by mouth daily., Disp: , Rfl:   •  diphenhydrAMINE-acetaminophen (TYLENOL PM)  MG tablet per tablet, Take 1 tablet by mouth At Night As Needed for sleep., Disp: , Rfl:   •  escitalopram (LEXAPRO) 20 MG tablet, Take 1 tablet by mouth Daily., Disp: 90 tablet, Rfl: 3  •  Glucose Blood (ONETOUCH ULTRA BLUE VI), daily., Disp: , Rfl:   •  lisinopril (PRINIVIL,ZESTRIL) 10 MG tablet, Take 1 tablet by mouth Daily., Disp: 90 tablet, Rfl: 3  •  metoprolol tartrate (LOPRESSOR) 25 MG tablet, Take 25 mg by mouth 2 (two) times a day., Disp: , Rfl:   •  Multiple Vitamins-Minerals (ONE-A-DAY MENS 50+ ADVANTAGE PO), Take  by mouth daily., Disp: , Rfl:   •  pantoprazole (PROTONIX) 40 MG EC tablet, Take 1 tablet by mouth Daily., Disp: 90 tablet, Rfl: 3  •  tamsulosin (FLOMAX) 0.4 MG capsule 24 hr capsule, Take 1 capsule by mouth Daily., Disp: 90 capsule, Rfl: 1  •  traMADol (ULTRAM) 50 MG tablet, Take 1 tablet by mouth Every 8 (Eight) Hours As Needed for Moderate Pain ., Disp: 75 tablet, Rfl: 3      Review of Systems  Constitutional: No fever, chills, night sweats since prostate issues, no weight loss.   HEENT:  No headaches, vision changes or hearing changes, no sinus drainage, sore throat.   Cardiovascular:  No palpitations, chest pain, syncopal episodes or edema.   Pulmonary:  No shortness of breath, hemoptysis, or cough.   Gastrointestinal:  No nausea or vomiting. No constipation or diarrhea (unless with increased dose of iron). No change in appetite. No abdominal pain. No melena or hematochezia.   Genitourinary:  No hematuria, or changes in urination.   Musculoskeletal:  No pain, or joint problems. Has hip pain  Skin: No rashes or pruritus.   Endocrine:  Occasional hot flashes, no chills   Hematologic:  No history of free bleeding, spontaneous bleeding or clotting problems. No lymphadenopathy. Has easy  bruising  Immunologic:  No allergies or frequent infections.   Neurologic: No numbness, tingling, or weakness.   Psychiatric:  No anxiety or depression.       Physical Exam  Vital Signs: These were reviewed and listed as per patient’s electronic medical chart  Vitals:    11/12/18 1013   BP: 126/72   Pulse: 61   Resp: 18   Temp: 98.5 °F (36.9 °C)   SpO2: 98%     General: Awake, alert and oriented, in no distress  HEENT: Head is atraumatic, normocephalic, extraocular movements full, oropharynx clear, no scleral icterus, pink moist mucous membranes  Neck: supple, no jvd, lymphadenopathy or masses  Cardiovascular: regular rate and rhythm without murmurs, rubs or gallops  Pulmonary: non-labored, clear to auscultation bilaterally, no wheezing  Abdomen: soft, non-tender, non-distended, normal active bowel sounds present  Extremities: No clubbing, cyanosis or edema  Lymph: No cervical, supraclavicular adenopathy  Neurologic: Mental status as above, alert, awake and oriented, grossly non-focal exam  Skin: warm, dry, intact, chronic skin lesions on upper extremities  MSK: mobilizes with a cane  Patient was examined on 11/12/18 and changes are reflected and noted above.      Labs / Studies:    Office Visit on 11/12/2018   Component Date Value   • WBC 11/12/2018 6.49    • RBC 11/12/2018 3.40*   • Hemoglobin 11/12/2018 10.1*   • Hematocrit 11/12/2018 31.7*   • MCV 11/12/2018 93.2    • MCH 11/12/2018 29.7    • MCHC 11/12/2018 31.9*   • RDW 11/12/2018 15.7*   • RDW-SD 11/12/2018 53.6    • MPV 11/12/2018 10.5*   • Platelets 11/12/2018 227    • Neutrophil % 11/12/2018 61.0    • Lymphocyte % 11/12/2018 23.1    • Monocyte % 11/12/2018 9.7    • Eosinophil % 11/12/2018 5.2    • Basophil % 11/12/2018 0.8    • Immature Grans % 11/12/2018 0.2    • Neutrophils, Absolute 11/12/2018 3.96    • Lymphocytes, Absolute 11/12/2018 1.50    • Monocytes, Absolute 11/12/2018 0.63    • Eosinophils, Absolute 11/12/2018 0.34    • Basophils, Absolute  11/12/2018 0.05    • Immature Grans, Absolute 11/12/2018 0.01    Consult on 10/15/2018   Component Date Value   • Iron 10/15/2018 66    • TIBC 10/15/2018 264    • Iron Saturation 10/15/2018 25    • Ferritin 10/15/2018 256.00    • Vitamin B-12 10/15/2018 1,369*   • Folate 10/15/2018 >24.00*   • LDH 10/15/2018 174    • Reticulocyte % 10/15/2018 1.52    • Reticulocyte Absolute 10/15/2018 0.0530    • Haptoglobin 10/15/2018 150    • Sed Rate 10/15/2018 29*   • Performed by: 10/15/2018 Kurtis Byrne    • Pathologist Interpretati* 10/15/2018 See scanned report    • WBC 10/15/2018 6.93    • RBC 10/15/2018 3.49*   • Hemoglobin 10/15/2018 10.3*   • Hematocrit 10/15/2018 32.3*   • MCV 10/15/2018 92.6    • MCH 10/15/2018 29.5    • MCHC 10/15/2018 31.9*   • RDW 10/15/2018 14.7*   • RDW-SD 10/15/2018 49.4    • MPV 10/15/2018 10.4*   • Platelets 10/15/2018 238    • Neutrophil % 10/15/2018 60.6    • Lymphocyte % 10/15/2018 27.7    • Monocyte % 10/15/2018 7.5    • Eosinophil % 10/15/2018 3.3    • Basophil % 10/15/2018 0.6    • Immature Grans % 10/15/2018 0.3    • Neutrophils, Absolute 10/15/2018 4.20    • Lymphocytes, Absolute 10/15/2018 1.92    • Monocytes, Absolute 10/15/2018 0.52    • Eosinophils, Absolute 10/15/2018 0.23    • Basophils, Absolute 10/15/2018 0.04    • Immature Grans, Absolute 10/15/2018 0.02    Office Visit on 09/18/2018   Component Date Value   • Glucose 09/18/2018 81    • BUN 09/18/2018 22*   • Creatinine 09/18/2018 1.41*   • Sodium 09/18/2018 140    • Potassium 09/18/2018 4.6    • Chloride 09/18/2018 105    • CO2 09/18/2018 28.2    • Calcium 09/18/2018 9.0    • Total Protein 09/18/2018 6.7    • Albumin 09/18/2018 4.00    • ALT (SGPT) 09/18/2018 14    • AST (SGOT) 09/18/2018 17    • Alkaline Phosphatase 09/18/2018 54    • Total Bilirubin 09/18/2018 0.3    • eGFR Non  Amer 09/18/2018 48*   • Globulin 09/18/2018 2.7    • A/G Ratio 09/18/2018 1.5    • BUN/Creatinine Ratio 09/18/2018 15.6    • Anion Gap  09/18/2018 6.8    • WBC 09/18/2018 8.17    • RBC 09/18/2018 3.25*   • Hemoglobin 09/18/2018 9.9*   • Hematocrit 09/18/2018 30.9*   • MCV 09/18/2018 95.1*   • MCH 09/18/2018 30.5    • MCHC 09/18/2018 32.0*   • RDW 09/18/2018 13.7    • RDW-SD 09/18/2018 45.6    • MPV 09/18/2018 11.2*   • Platelets 09/18/2018 218    • Neutrophil % 09/18/2018 61.4    • Lymphocyte % 09/18/2018 22.2    • Monocyte % 09/18/2018 10.6    • Eosinophil % 09/18/2018 5.1    • Basophil % 09/18/2018 0.6    • Immature Grans % 09/18/2018 0.1    • Neutrophils, Absolute 09/18/2018 5.01    • Lymphocytes, Absolute 09/18/2018 1.81    • Monocytes, Absolute 09/18/2018 0.87    • Eosinophils, Absolute 09/18/2018 0.42    • Basophils, Absolute 09/18/2018 0.05    • Immature Grans, Absolute 09/18/2018 0.01    • Osmolality Calc 09/18/2018 281.8    Appointment on 09/11/2018   Component Date Value   • PSA 09/11/2018 0.020         PATHOLOGY:  10/15/18: Peripheral Smear:            Assessment/Plan   Rubén Rivas is a very pleasant 83 y.o.  male who presents in follow up appointment at the request of Dr. Chapin Sánchez for further management and evaluation of normocytic anemia.     Normocytic anemia  - I spent a good amount of time during the appointment discussing the possible differential diagnosis of anemia (which is quite vast) as well as possible prognosis and management of differential.  - Complete blood count today shows a slightly improved hemoglobin (overall stable) and peripheral smear is consistent for a mild normocytic anemia.  - Iron panel is within normal limits and B12 and folate show repletion and in fact elevated.   - LDH and reticulocyte count are within normal limits and thus does not appear to be hemolysis, and normal haptoglobin.  - ESR is elevated indicating underlying inflammation that may be causing bone marrow suppression contributing to his anemia.   - Will also obtain an SPEP and serum free light chains to further evaluate given that  above workup has been unremarkable.  - He likely has anemia of chronic disease versus the anemia of the elderly and I have also discussed pursuing bone marrow biopsy should above workup remain unremarkable to also evaluate for the possibility of an underlying hematological disorder. However he does not want to pursue a bone marrow biopsy and reports that he has had long standing anemia and would prefer that this be monitored by his primary provider as he wants to cut down on his doctor visits.    ACO Quality measures  - Rubén Rivas does not use tobacco products.  - Patient's Body mass index is 25.45 kg/m². BMI is slightly above normal parameters. Recommendations include: referral to primary care.  - Current outpatient and discharge medications have been reconciled for the patient.  Reviewed by: Sera Sorenson MD      I will have the patient return in follow up appointment to review test results in six week. He understands that should he have any questions or concerns prior to his appointment he should give us a call at any time and I would be happy to see him sooner. It was a pleasure to see this patient in clinic today, thank you for allowing me to participate in the care of this patient.      Sera Sorenson MD  11/12/2018  1:16 PM

## 2018-11-13 LAB
ALBUMIN SERPL-MCNC: 3.5 G/DL (ref 2.9–4.4)
ALBUMIN/GLOB SERPL: 1.3 {RATIO} (ref 0.7–1.7)
ALPHA1 GLOB FLD ELPH-MCNC: 0.2 G/DL (ref 0–0.4)
ALPHA2 GLOB SERPL ELPH-MCNC: 0.7 G/DL (ref 0.4–1)
B-GLOBULIN SERPL ELPH-MCNC: 1 G/DL (ref 0.7–1.3)
GAMMA GLOB SERPL ELPH-MCNC: 0.8 G/DL (ref 0.4–1.8)
GLOBULIN SER CALC-MCNC: 2.7 G/DL (ref 2.2–3.9)
IGA SERPL-MCNC: 254 MG/DL (ref 61–437)
IGG SERPL-MCNC: 885 MG/DL (ref 700–1600)
IGM SERPL-MCNC: 104 MG/DL (ref 15–143)
INTERPRETATION SERPL IEP-IMP: NORMAL
KAPPA LC SERPL-MCNC: 43.8 MG/L (ref 3.3–19.4)
KAPPA LC/LAMBDA SER: 1.65 {RATIO} (ref 0.26–1.65)
LAMBDA LC FREE SERPL-MCNC: 26.6 MG/L (ref 5.7–26.3)
Lab: NORMAL
M-SPIKE: NORMAL G/DL
PROT SERPL-MCNC: 6.2 G/DL (ref 6–8.5)

## 2018-11-28 RX ORDER — ESCITALOPRAM OXALATE 20 MG/1
TABLET ORAL
Qty: 90 TABLET | Refills: 3 | OUTPATIENT
Start: 2018-11-28

## 2018-11-30 ENCOUNTER — DOCUMENTATION (OUTPATIENT)
Dept: ONCOLOGY | Facility: CLINIC | Age: 83
End: 2018-11-30

## 2018-11-30 DIAGNOSIS — D64.9 ANEMIA, UNSPECIFIED TYPE: Primary | ICD-10-CM

## 2018-11-30 NOTE — PROGRESS NOTES
I discussed the patient's test results with the patient's daughter - Aleksandra Brown phone number: [phone] as per Mr. Rivas's request. Serum free light chains were both elevated and may be due to underlying inflammation as the ratio was normal. While the SPEP did not indicate an abnormal M spike, it was unclear as to whether there was the presence of a monoclonal antibody seen on immunofixation. Therefore will have patient return in 6 months as he does want to minimize physician visits to repeat lab studies.

## 2018-12-07 RX ORDER — TAMSULOSIN HYDROCHLORIDE 0.4 MG/1
0.4 CAPSULE ORAL DAILY
Qty: 90 CAPSULE | Refills: 1 | Status: SHIPPED | OUTPATIENT
Start: 2018-12-07 | End: 2019-07-12 | Stop reason: SDUPTHER

## 2019-01-14 ENCOUNTER — OFFICE VISIT (OUTPATIENT)
Dept: FAMILY MEDICINE CLINIC | Facility: CLINIC | Age: 84
End: 2019-01-14

## 2019-01-14 VITALS
SYSTOLIC BLOOD PRESSURE: 134 MMHG | HEIGHT: 70 IN | WEIGHT: 177.7 LBS | DIASTOLIC BLOOD PRESSURE: 73 MMHG | HEART RATE: 69 BPM | BODY MASS INDEX: 25.44 KG/M2 | TEMPERATURE: 97.6 F

## 2019-01-14 DIAGNOSIS — I10 ESSENTIAL HYPERTENSION: ICD-10-CM

## 2019-01-14 DIAGNOSIS — M19.90 CHRONIC OSTEOARTHRITIS: ICD-10-CM

## 2019-01-14 PROCEDURE — 99213 OFFICE O/P EST LOW 20 MIN: CPT | Performed by: FAMILY MEDICINE

## 2019-01-14 PROCEDURE — 96372 THER/PROPH/DIAG INJ SC/IM: CPT | Performed by: FAMILY MEDICINE

## 2019-01-14 RX ORDER — LISINOPRIL 10 MG/1
10 TABLET ORAL DAILY
Qty: 90 TABLET | Refills: 3 | Status: SHIPPED | OUTPATIENT
Start: 2019-01-14 | End: 2020-10-20

## 2019-01-14 RX ORDER — METHYLPREDNISOLONE ACETATE 40 MG/ML
40 INJECTION, SUSPENSION INTRA-ARTICULAR; INTRALESIONAL; INTRAMUSCULAR; SOFT TISSUE ONCE
Status: COMPLETED | OUTPATIENT
Start: 2019-01-14 | End: 2019-01-14

## 2019-01-14 RX ORDER — METHYLPREDNISOLONE ACETATE 80 MG/ML
80 INJECTION, SUSPENSION INTRA-ARTICULAR; INTRALESIONAL; INTRAMUSCULAR; SOFT TISSUE ONCE
Status: COMPLETED | OUTPATIENT
Start: 2019-01-14 | End: 2019-01-14

## 2019-01-14 RX ORDER — ATORVASTATIN CALCIUM 80 MG/1
40 TABLET, FILM COATED ORAL DAILY
Qty: 90 TABLET | Refills: 1 | Status: SHIPPED | OUTPATIENT
Start: 2019-01-14 | End: 2019-06-06

## 2019-01-14 RX ORDER — CLOPIDOGREL BISULFATE 75 MG/1
75 TABLET ORAL DAILY
Qty: 90 TABLET | Refills: 1 | Status: SHIPPED | OUTPATIENT
Start: 2019-01-14 | End: 2019-05-21 | Stop reason: SDUPTHER

## 2019-01-14 RX ORDER — TRAMADOL HYDROCHLORIDE 50 MG/1
50 TABLET ORAL EVERY 8 HOURS PRN
Qty: 75 TABLET | Refills: 3 | Status: CANCELLED | OUTPATIENT
Start: 2019-01-14

## 2019-01-14 RX ADMIN — METHYLPREDNISOLONE ACETATE 40 MG: 40 INJECTION, SUSPENSION INTRA-ARTICULAR; INTRALESIONAL; INTRAMUSCULAR; SOFT TISSUE at 12:08

## 2019-01-14 RX ADMIN — METHYLPREDNISOLONE ACETATE 80 MG: 80 INJECTION, SUSPENSION INTRA-ARTICULAR; INTRALESIONAL; INTRAMUSCULAR; SOFT TISSUE at 12:08

## 2019-01-14 NOTE — PROGRESS NOTES
Subjective   Rubén Rivas is a 83 y.o. male.     History of Present Illness follow-up regarding mostly the arthritis.  Did visit with hematology.  Those records are reviewed.  Medications are as reconciled.  Continues to experience waxing waning energy.  Appetite good.  Denies CP SOB palpitations GI  tract changes.  Stays fairly active.  Arthritis pain is major subjective concern.      The following portions of the patient's history were reviewed and updated as appropriate: allergies, past family history, past medical history, past social history, past surgical history and problem list.    Review of Systems see the history of present illness    Objective   Physical Exam   Constitutional: He is oriented to person, place, and time. He appears well-developed and well-nourished.   HENT:   Head: Normocephalic.   Eyes: Conjunctivae and EOM are normal. Pupils are equal, round, and reactive to light.   Neck: Normal range of motion. Neck supple. No tracheal deviation present. No thyromegaly present.   Cardiovascular: Normal rate, regular rhythm and normal heart sounds.   No murmur heard.  Pulmonary/Chest: Effort normal and breath sounds normal.   Musculoskeletal: He exhibits no edema.   Neurological: He is alert and oriented to person, place, and time.   Skin: Skin is warm and dry.   Psychiatric: He has a normal mood and affect.   Vitals reviewed.      Assessment/Plan   Rubén was seen today for essential hypertension.    Diagnoses and all orders for this visit:    Essential hypertension  -     lisinopril (PRINIVIL,ZESTRIL) 10 MG tablet; Take 1 tablet by mouth Daily.    Chronic osteoarthritis  -     methylPREDNISolone acetate (DEPO-medrol) injection 40 mg; Inject 1 mL into the appropriate muscle as directed by prescriber 1 (One) Time.  -     methylPREDNISolone acetate (DEPO-medrol) injection 80 mg; Inject 1 mL into the appropriate muscle as directed by prescriber 1 (One) Time.    Other orders  -     atorvastatin (LIPITOR) 80  MG tablet; Take 0.5 tablets by mouth Daily.  -     clopidogrel (PLAVIX) 75 MG tablet; Take 1 tablet by mouth Daily.  -     metoprolol tartrate (LOPRESSOR) 25 MG tablet; Take 1 tablet by mouth 2 (Two) Times a Day.  -     Cancel: traMADol (ULTRAM) 50 MG tablet; Take 1 tablet by mouth Every 8 (Eight) Hours As Needed for Moderate Pain .     meds as directed.  Injection.  Will ask you to follow-up in about 4 months or as needed.  Stay safely active.  Maintain heart healthy diet.  Will monitor lab in  future.  Counseled regarding hepatitis A vaccine.    Patient's Body mass index is 25.5 kg/m². BMI is within normal parameters. No follow-up required.

## 2019-04-08 RX ORDER — ESCITALOPRAM OXALATE 20 MG/1
20 TABLET ORAL DAILY
Qty: 90 TABLET | Refills: 3 | Status: SHIPPED | OUTPATIENT
Start: 2019-04-08 | End: 2019-09-24 | Stop reason: SDUPTHER

## 2019-04-08 NOTE — TELEPHONE ENCOUNTER
LAST FILLED ESCITALOPRAM  1-4-18 # 90 REFILL X 3    LAST APPOINTMENT: 1-14-19  NEXT APPOINTMENT: 5-14-19

## 2019-04-12 DIAGNOSIS — M19.90 CHRONIC OSTEOARTHRITIS: ICD-10-CM

## 2019-04-12 RX ORDER — TRAMADOL HYDROCHLORIDE 50 MG/1
TABLET ORAL
Qty: 75 TABLET | Refills: 4 | OUTPATIENT
Start: 2019-04-12

## 2019-04-15 DIAGNOSIS — M19.90 CHRONIC OSTEOARTHRITIS: ICD-10-CM

## 2019-04-15 RX ORDER — TRAMADOL HYDROCHLORIDE 50 MG/1
50 TABLET ORAL EVERY 8 HOURS PRN
Qty: 75 TABLET | Refills: 2 | OUTPATIENT
Start: 2019-04-15 | End: 2019-07-15 | Stop reason: SDUPTHER

## 2019-04-15 RX ORDER — TRAMADOL HYDROCHLORIDE 50 MG/1
50 TABLET ORAL EVERY 8 HOURS PRN
Qty: 75 TABLET | Refills: 3 | OUTPATIENT
Start: 2019-04-15 | End: 2019-04-15 | Stop reason: SDUPTHER

## 2019-04-30 RX ORDER — TAMSULOSIN HYDROCHLORIDE 0.4 MG/1
CAPSULE ORAL
Qty: 90 CAPSULE | Refills: 1 | OUTPATIENT
Start: 2019-04-30

## 2019-05-21 ENCOUNTER — OFFICE VISIT (OUTPATIENT)
Dept: FAMILY MEDICINE CLINIC | Facility: CLINIC | Age: 84
End: 2019-05-21

## 2019-05-21 VITALS
WEIGHT: 177.6 LBS | TEMPERATURE: 98.8 F | HEART RATE: 72 BPM | DIASTOLIC BLOOD PRESSURE: 58 MMHG | SYSTOLIC BLOOD PRESSURE: 122 MMHG | HEIGHT: 70 IN | OXYGEN SATURATION: 97 % | BODY MASS INDEX: 25.43 KG/M2

## 2019-05-21 DIAGNOSIS — I10 ESSENTIAL HYPERTENSION: Primary | ICD-10-CM

## 2019-05-21 DIAGNOSIS — E78.2 MIXED HYPERLIPIDEMIA: ICD-10-CM

## 2019-05-21 DIAGNOSIS — D64.9 ANEMIA, UNSPECIFIED TYPE: ICD-10-CM

## 2019-05-21 DIAGNOSIS — M19.90 CHRONIC OSTEOARTHRITIS: ICD-10-CM

## 2019-05-21 PROCEDURE — 99214 OFFICE O/P EST MOD 30 MIN: CPT | Performed by: FAMILY MEDICINE

## 2019-05-21 PROCEDURE — 90732 PPSV23 VACC 2 YRS+ SUBQ/IM: CPT | Performed by: FAMILY MEDICINE

## 2019-05-21 PROCEDURE — 96372 THER/PROPH/DIAG INJ SC/IM: CPT | Performed by: FAMILY MEDICINE

## 2019-05-21 PROCEDURE — G0009 ADMIN PNEUMOCOCCAL VACCINE: HCPCS | Performed by: FAMILY MEDICINE

## 2019-05-21 RX ORDER — METHYLPREDNISOLONE ACETATE 40 MG/ML
40 INJECTION, SUSPENSION INTRA-ARTICULAR; INTRALESIONAL; INTRAMUSCULAR; SOFT TISSUE ONCE
Status: COMPLETED | OUTPATIENT
Start: 2019-05-21 | End: 2019-05-21

## 2019-05-21 RX ORDER — PANTOPRAZOLE SODIUM 40 MG/1
TABLET, DELAYED RELEASE ORAL
Qty: 90 TABLET | Refills: 3 | OUTPATIENT
Start: 2019-05-21

## 2019-05-21 RX ORDER — CLOPIDOGREL BISULFATE 75 MG/1
75 TABLET ORAL DAILY
Qty: 90 TABLET | Refills: 3 | Status: SHIPPED | OUTPATIENT
Start: 2019-05-21 | End: 2020-04-27 | Stop reason: SDUPTHER

## 2019-05-21 RX ORDER — METHYLPREDNISOLONE ACETATE 80 MG/ML
80 INJECTION, SUSPENSION INTRA-ARTICULAR; INTRALESIONAL; INTRAMUSCULAR; SOFT TISSUE ONCE
Status: COMPLETED | OUTPATIENT
Start: 2019-05-21 | End: 2019-05-21

## 2019-05-21 RX ORDER — PANTOPRAZOLE SODIUM 40 MG/1
40 TABLET, DELAYED RELEASE ORAL DAILY
Qty: 90 TABLET | Refills: 3 | Status: SHIPPED | OUTPATIENT
Start: 2019-05-21 | End: 2020-09-11 | Stop reason: SDUPTHER

## 2019-05-21 RX ADMIN — METHYLPREDNISOLONE ACETATE 40 MG: 40 INJECTION, SUSPENSION INTRA-ARTICULAR; INTRALESIONAL; INTRAMUSCULAR; SOFT TISSUE at 16:39

## 2019-05-21 RX ADMIN — METHYLPREDNISOLONE ACETATE 80 MG: 80 INJECTION, SUSPENSION INTRA-ARTICULAR; INTRALESIONAL; INTRAMUSCULAR; SOFT TISSUE at 16:40

## 2019-05-21 NOTE — PROGRESS NOTES
Subjective   Rubén Rivas is a 83 y.o. male.     History of Present Illness follow-up regarding hypertension diffuse arthritis CAD.  Follows with urology routinely regarding prostate.  Has had recent visit with cardiology.  Will have upcoming follow-up with hematology regarding the chronic anemia.  Medications are as reconciled.  No recent changes.  Energy is good.  Sleep patterns good.  Appetite good.  Trying to maintain reasonable heart healthy diet.  Denies chest pain palpitations respiratory GI  changes.  No unusual bruising or bleeding.  Denies GI  changes.  The following portions of the patient's history were reviewed and updated as appropriate: allergies, current medications, past medical history, past social history, past surgical history and problem list.    Review of Systems  See history of Present Illness     Objective     Physical Exam   Constitutional: He is oriented to person, place, and time. He appears well-developed and well-nourished.   HENT:   Head: Normocephalic.   Mouth/Throat: Oropharynx is clear and moist.   Eyes: Conjunctivae and EOM are normal. Pupils are equal, round, and reactive to light.   Neck: Normal range of motion. Neck supple. No tracheal deviation present. No thyromegaly present.   Cardiovascular: Normal rate, regular rhythm and normal heart sounds.   No murmur heard.  Pulmonary/Chest: Effort normal and breath sounds normal.   Abdominal: Soft. There is no tenderness.   Musculoskeletal: He exhibits no edema.   Neurological: He is alert and oriented to person, place, and time.   Skin: Skin is warm and dry.   Psychiatric: He has a normal mood and affect.   Vitals reviewed.      PHQ-9 Total Score:      Patient's Body mass index is 25.5 kg/m². BMI is above normal parameters. Recommendations include: exercise counseling and nutrition counseling.   (Normal BMI:  18.5-24.9, OW 25-29.9, Obesity 30 or greater)      Assessment/Plan     Rubén was seen today for hypertension.    Diagnoses and  all orders for this visit:    Essential hypertension  -     Comprehensive Metabolic Panel; Future    Chronic osteoarthritis  -     pantoprazole (PROTONIX) 40 MG EC tablet; Take 1 tablet by mouth Daily.  -     methylPREDNISolone acetate (DEPO-medrol) injection 40 mg  -     methylPREDNISolone acetate (DEPO-medrol) injection 80 mg    Anemia, unspecified type  -     pantoprazole (PROTONIX) 40 MG EC tablet; Take 1 tablet by mouth Daily.    Mixed hyperlipidemia  -     Comprehensive Metabolic Panel; Future  -     Lipid Panel; Future  -     TSH; Future    Other orders  -     clopidogrel (PLAVIX) 75 MG tablet; Take 1 tablet by mouth Daily.  -     Pneumococcal Polysaccharide Vaccine 23-Valent Greater Than or Equal To 3yo Subcutaneous / IM    Today after consent pneumococcal 23 vaccine given.  Also administered steroid injection for the chronic arthritis.  Some renewals authorized also.  Encouraged to maintain safe activity heart healthy diet.  Keep follow-ups as noted.  We will ask for lab in near future regarding metabolic parameters.  Stay safely active.  Recheck here in about 4 months or as needed.                 This document has been electronically signed by Chapin Sánchez MD   May 21, 2019 3:57 PM

## 2019-06-05 ENCOUNTER — LAB (OUTPATIENT)
Dept: FAMILY MEDICINE CLINIC | Facility: CLINIC | Age: 84
End: 2019-06-05

## 2019-06-05 DIAGNOSIS — E78.2 MIXED HYPERLIPIDEMIA: ICD-10-CM

## 2019-06-05 DIAGNOSIS — I10 ESSENTIAL HYPERTENSION: ICD-10-CM

## 2019-06-05 LAB
ALBUMIN SERPL-MCNC: 4 G/DL (ref 3.5–5.2)
ALBUMIN/GLOB SERPL: 1.5 G/DL
ALP SERPL-CCNC: 51 U/L (ref 39–117)
ALT SERPL W P-5'-P-CCNC: 17 U/L (ref 1–41)
ANION GAP SERPL CALCULATED.3IONS-SCNC: 9.9 MMOL/L
AST SERPL-CCNC: 20 U/L (ref 1–40)
BILIRUB SERPL-MCNC: 0.3 MG/DL (ref 0.2–1.2)
BUN BLD-MCNC: 29 MG/DL (ref 8–23)
BUN/CREAT SERPL: 19.2 (ref 7–25)
CALCIUM SPEC-SCNC: 9.1 MG/DL (ref 8.6–10.5)
CHLORIDE SERPL-SCNC: 106 MMOL/L (ref 98–107)
CHOLEST SERPL-MCNC: 192 MG/DL (ref 0–200)
CO2 SERPL-SCNC: 28.1 MMOL/L (ref 22–29)
CREAT BLD-MCNC: 1.51 MG/DL (ref 0.76–1.27)
GFR SERPL CREATININE-BSD FRML MDRD: 44 ML/MIN/1.73
GLOBULIN UR ELPH-MCNC: 2.7 GM/DL
GLUCOSE BLD-MCNC: 91 MG/DL (ref 65–99)
HDLC SERPL-MCNC: 38 MG/DL (ref 40–60)
LDLC SERPL CALC-MCNC: 121 MG/DL (ref 0–100)
LDLC/HDLC SERPL: 3.17 {RATIO}
POTASSIUM BLD-SCNC: 5.1 MMOL/L (ref 3.5–5.2)
PROT SERPL-MCNC: 6.7 G/DL (ref 6–8.5)
SODIUM BLD-SCNC: 144 MMOL/L (ref 136–145)
TRIGL SERPL-MCNC: 167 MG/DL (ref 0–150)
TSH SERPL DL<=0.05 MIU/L-ACNC: 1.57 MIU/ML (ref 0.27–4.2)
VLDLC SERPL-MCNC: 33.4 MG/DL (ref 5–40)

## 2019-06-05 PROCEDURE — 36415 COLL VENOUS BLD VENIPUNCTURE: CPT | Performed by: NURSE PRACTITIONER

## 2019-06-05 PROCEDURE — 80053 COMPREHEN METABOLIC PANEL: CPT | Performed by: FAMILY MEDICINE

## 2019-06-05 PROCEDURE — 84443 ASSAY THYROID STIM HORMONE: CPT | Performed by: FAMILY MEDICINE

## 2019-06-05 PROCEDURE — 80061 LIPID PANEL: CPT | Performed by: FAMILY MEDICINE

## 2019-06-06 RX ORDER — ROSUVASTATIN CALCIUM 20 MG/1
20 TABLET, COATED ORAL DAILY
Qty: 90 TABLET | Refills: 3 | Status: SHIPPED | OUTPATIENT
Start: 2019-06-06 | End: 2019-12-12 | Stop reason: SDUPTHER

## 2019-06-06 NOTE — PROGRESS NOTES
Lab results very stable except the cholesterol profile not as good.  Encouraged to increase the Lipitor to a whole pill each dose.

## 2019-07-12 ENCOUNTER — TELEPHONE (OUTPATIENT)
Dept: FAMILY MEDICINE CLINIC | Facility: CLINIC | Age: 84
End: 2019-07-12

## 2019-07-12 RX ORDER — TAMSULOSIN HYDROCHLORIDE 0.4 MG/1
0.4 CAPSULE ORAL DAILY
Qty: 90 CAPSULE | Refills: 1 | Status: SHIPPED | OUTPATIENT
Start: 2019-07-12 | End: 2019-12-02 | Stop reason: SDUPTHER

## 2019-07-12 NOTE — TELEPHONE ENCOUNTER
PT NEEDS RX WRITTEN FOR TRAMADOL TO SEND IN MAIL IN, THEY ARE NOT WANTING TO PAY AT THE LOCAL PHARMACY ANYMORE

## 2019-07-15 DIAGNOSIS — M19.90 CHRONIC OSTEOARTHRITIS: ICD-10-CM

## 2019-07-15 RX ORDER — TRAMADOL HYDROCHLORIDE 50 MG/1
TABLET ORAL
Qty: 270 TABLET | Refills: 1
Start: 2019-07-15 | End: 2020-01-20 | Stop reason: SDUPTHER

## 2019-09-11 ENCOUNTER — TELEPHONE (OUTPATIENT)
Dept: UROLOGY | Facility: CLINIC | Age: 84
End: 2019-09-11

## 2019-09-11 DIAGNOSIS — C61 PROSTATE CANCER (HCC): Primary | ICD-10-CM

## 2019-09-13 ENCOUNTER — LAB (OUTPATIENT)
Dept: FAMILY MEDICINE CLINIC | Facility: CLINIC | Age: 84
End: 2019-09-13

## 2019-09-13 DIAGNOSIS — C61 PROSTATE CANCER (HCC): ICD-10-CM

## 2019-09-13 PROCEDURE — 36415 COLL VENOUS BLD VENIPUNCTURE: CPT | Performed by: NURSE PRACTITIONER

## 2019-09-13 PROCEDURE — 84153 ASSAY OF PSA TOTAL: CPT | Performed by: UROLOGY

## 2019-09-14 LAB — PSA SERPL-MCNC: <0.014 NG/ML (ref 0–4)

## 2019-09-18 ENCOUNTER — OFFICE VISIT (OUTPATIENT)
Dept: UROLOGY | Facility: CLINIC | Age: 84
End: 2019-09-18

## 2019-09-18 VITALS
WEIGHT: 183 LBS | HEIGHT: 70 IN | BODY MASS INDEX: 26.2 KG/M2 | DIASTOLIC BLOOD PRESSURE: 68 MMHG | TEMPERATURE: 97.6 F | SYSTOLIC BLOOD PRESSURE: 134 MMHG

## 2019-09-18 DIAGNOSIS — C61 PROSTATE CANCER (HCC): Primary | ICD-10-CM

## 2019-09-18 PROCEDURE — 99213 OFFICE O/P EST LOW 20 MIN: CPT | Performed by: UROLOGY

## 2019-09-18 NOTE — PROGRESS NOTES
Chief Complaint:          Prostate cancer    HPI:   83 y.o. male.  His psa is 0.014.  Pt had his cancer diagnosed in 2014 and he had 12/12 biopsies positive for carmita score of 7 stage T2b.  He had radiation therapy that finished up in 2015.  He had 2 years of lupron injections.  His last lupron injections was September in 2016 for 2 years of lupron.  Pt has very few hot flashes.  His psa this year is 0.02 and last year it was 0.03  HPI      Past Medical History:        Past Medical History:   Diagnosis Date   • Anemia    • Arthritis    • Depression    • Hyperlipidemia    • Hypertension    • Prostate cancer (CMS/[facility]) 2013         Current Meds:     Current Outpatient Medications   Medication Sig Dispense Refill   • aspirin 81 MG tablet Take  by mouth daily.     • clopidogrel (PLAVIX) 75 MG tablet Take 1 tablet by mouth Daily. 90 tablet 3   • diphenhydrAMINE-acetaminophen (TYLENOL PM)  MG tablet per tablet Take 1 tablet by mouth At Night As Needed for sleep.     • escitalopram (LEXAPRO) 20 MG tablet Take 1 tablet by mouth Daily. 90 tablet 3   • FIBER PO Take  by mouth Daily.     • Glucose Blood (ONETOUCH ULTRA BLUE VI) daily.     • lisinopril (PRINIVIL,ZESTRIL) 10 MG tablet Take 1 tablet by mouth Daily. 90 tablet 3   • metoprolol tartrate (LOPRESSOR) 25 MG tablet Take 1 tablet by mouth 2 (Two) Times a Day. 180 tablet 1   • Multiple Vitamins-Minerals (ONE-A-DAY MENS 50+ ADVANTAGE PO) Take  by mouth daily.     • pantoprazole (PROTONIX) 40 MG EC tablet Take 1 tablet by mouth Daily. 90 tablet 3   • rosuvastatin (CRESTOR) 20 MG tablet Take 1 tablet by mouth Daily. 90 tablet 3   • tamsulosin (FLOMAX) 0.4 MG capsule 24 hr capsule Take 1 capsule by mouth Daily. 90 capsule 1   • traMADol (ULTRAM) 50 MG tablet Take 1 tab every 8-12 hours as needed moderate pain 270 tablet 1     No current facility-administered medications for this visit.         Allergies:      Allergies   Allergen Reactions   • Sulfa Antibiotics          Past Surgical History:     Past Surgical History:   Procedure Laterality Date   • CARDIAC SURGERY     • HERNIA REPAIR     • STOMACH SURGERY           Social History:     Social History     Socioeconomic History   • Marital status:      Spouse name: Not on file   • Number of children: Not on file   • Years of education: Not on file   • Highest education level: Not on file   Tobacco Use   • Smoking status: Former Smoker     Types: Cigarettes     Last attempt to quit: 1976     Years since quittin.1   • Smokeless tobacco: Never Used   Substance and Sexual Activity   • Alcohol use: No   • Drug use: No       Family History:     Family History   Problem Relation Age of Onset   • Cancer Mother 35        breast   • Heart attack Father    • Cancer Sister         cervical   • Heart attack Brother    • Heart disease Brother    • Cancer Brother         Prostate       Review of Systems:     Review of Systems   Constitutional: Negative for chills and fever.   HENT: Negative for sinus pressure and sinus pain.    Respiratory: Negative for cough.    Cardiovascular: Negative for leg swelling.   Gastrointestinal: Negative for abdominal pain.   Genitourinary: Negative for dysuria, frequency and urgency.   Musculoskeletal: Negative for back pain.   Neurological: Negative for headaches.   Psychiatric/Behavioral: The patient is not nervous/anxious.        IPSS Questionnaire (AUA-7):  Over the past month…    1)  Incomplete Emptying  How often have you had a sensation of not emptying your bladder?  0 - Not at all   2)  Frequency  How often have you had to urinate less than every two hours? 0 - Not at all   3)  Intermittency  How often have you found you stopped and started again several times when you urinated?  0 - Not at all   4) Urgency  How often have you found it difficult to postpone urination?  0 - Not at all   5) Weak Stream  How often have you had a weak urinary stream?  0 - Not at all   6) Straining  How often  "have you had to push or strain to begin urination?  0 - Not at all   7) Nocturia  How many times did you typically get up at night to urinate?  2 - 2 times   Total Score:  2       Quality of life due to urinary symptoms:  If you were to spend the rest of your life with your urinary condition the way it is now, how would you feel about that? 0-Delighted   Urine Leakage (Incontinence) 0-No Leakage       I have reviewed the follow portions of the patient's history and confirmed they are accurate today:  allergies, current medications, past family history, past medical history, past social history, past surgical history, problem list and ROS  Physical Exam:     Physical Exam    /68 (BP Location: Left arm, Patient Position: Sitting, Cuff Size: Adult)   Temp 97.6 °F (36.4 °C) (Oral)   Ht 177.8 cm (70\")   Wt 83 kg (183 lb)   BMI 26.26 kg/m²    Procedure:         Assessment:     Encounter Diagnosis   Name Primary?   • Prostate cancer (CMS/HCC) Yes       No orders of the defined types were placed in this encounter.      Patient reports that he is not currently experiencing any symptoms of urinary incontinence.      Plan:   Will see pt back in a year.  Psa prior    Patient's Body mass index is 26.26 kg/m². BMI is within normal parameters. No follow-up required..      Smoking Cessation Counseling:  Former smoker.  Patient does not currently use any tobacco products.   Counseling was given to patient for the following topics diagnostic results including: psa. The interim medical history and current results were reviewed.  A treatment plan with follow-up was made for Prostate cancer (CMS/HCC) [C61].   This document has been electronically signed by Ze Carey MD September 18, 2019 9:35 AM      "

## 2019-09-24 ENCOUNTER — OFFICE VISIT (OUTPATIENT)
Dept: FAMILY MEDICINE CLINIC | Facility: CLINIC | Age: 84
End: 2019-09-24

## 2019-09-24 VITALS
HEIGHT: 70 IN | BODY MASS INDEX: 25.87 KG/M2 | WEIGHT: 180.7 LBS | TEMPERATURE: 97.7 F | HEART RATE: 82 BPM | DIASTOLIC BLOOD PRESSURE: 68 MMHG | OXYGEN SATURATION: 98 % | SYSTOLIC BLOOD PRESSURE: 140 MMHG

## 2019-09-24 DIAGNOSIS — F32.4 MAJOR DEPRESSIVE DISORDER WITH SINGLE EPISODE, IN PARTIAL REMISSION (HCC): ICD-10-CM

## 2019-09-24 DIAGNOSIS — M19.90 CHRONIC OSTEOARTHRITIS: ICD-10-CM

## 2019-09-24 DIAGNOSIS — E78.2 MIXED HYPERLIPIDEMIA: ICD-10-CM

## 2019-09-24 DIAGNOSIS — I10 ESSENTIAL HYPERTENSION: ICD-10-CM

## 2019-09-24 DIAGNOSIS — Z00.00 MEDICARE ANNUAL WELLNESS VISIT, SUBSEQUENT: ICD-10-CM

## 2019-09-24 DIAGNOSIS — Z23 FLU VACCINE NEED: Primary | ICD-10-CM

## 2019-09-24 DIAGNOSIS — G31.84 MILD COGNITIVE IMPAIRMENT WITH MEMORY LOSS: ICD-10-CM

## 2019-09-24 PROCEDURE — G0439 PPPS, SUBSEQ VISIT: HCPCS | Performed by: FAMILY MEDICINE

## 2019-09-24 PROCEDURE — 96372 THER/PROPH/DIAG INJ SC/IM: CPT | Performed by: FAMILY MEDICINE

## 2019-09-24 PROCEDURE — G0008 ADMIN INFLUENZA VIRUS VAC: HCPCS | Performed by: FAMILY MEDICINE

## 2019-09-24 PROCEDURE — 90653 IIV ADJUVANT VACCINE IM: CPT | Performed by: FAMILY MEDICINE

## 2019-09-24 RX ORDER — ESCITALOPRAM OXALATE 20 MG/1
20 TABLET ORAL DAILY
Qty: 90 TABLET | Refills: 3 | Status: SHIPPED | OUTPATIENT
Start: 2019-09-24 | End: 2020-04-17 | Stop reason: SDUPTHER

## 2019-09-24 RX ORDER — METHYLPREDNISOLONE ACETATE 80 MG/ML
80 INJECTION, SUSPENSION INTRA-ARTICULAR; INTRALESIONAL; INTRAMUSCULAR; SOFT TISSUE ONCE
Status: COMPLETED | OUTPATIENT
Start: 2019-09-24 | End: 2019-09-24

## 2019-09-24 RX ORDER — DONEPEZIL HYDROCHLORIDE 5 MG/1
5 TABLET, FILM COATED ORAL NIGHTLY
Qty: 90 TABLET | Refills: 0 | Status: SHIPPED | OUTPATIENT
Start: 2019-09-24 | End: 2019-10-31 | Stop reason: SDUPTHER

## 2019-09-24 RX ORDER — METHYLPREDNISOLONE ACETATE 40 MG/ML
40 INJECTION, SUSPENSION INTRA-ARTICULAR; INTRALESIONAL; INTRAMUSCULAR; SOFT TISSUE ONCE
Status: COMPLETED | OUTPATIENT
Start: 2019-09-24 | End: 2019-09-24

## 2019-09-24 RX ADMIN — METHYLPREDNISOLONE ACETATE 40 MG: 40 INJECTION, SUSPENSION INTRA-ARTICULAR; INTRALESIONAL; INTRAMUSCULAR; SOFT TISSUE at 12:19

## 2019-09-24 RX ADMIN — METHYLPREDNISOLONE ACETATE 80 MG: 80 INJECTION, SUSPENSION INTRA-ARTICULAR; INTRALESIONAL; INTRAMUSCULAR; SOFT TISSUE at 12:19

## 2019-09-24 NOTE — PROGRESS NOTES
The ABCs of the Annual Wellness Visit  Subsequent Medicare Wellness Visit    Chief Complaint   Patient presents with   • Medicare Wellness-subsequent   • essential hypertension       Subjective   History of Present Illness:  Rubén Rivas is a 83 y.o. male who presents for a Subsequent Medicare Wellness Visit.  Follow-up hypertension arthritis.  Wife is present and broaches topic of significant problems with short-term memory.  Long conversation regarding that.  Has visited with urology.  Maintains follow-up at cardiology clinic.  Utilizing medications as reconciled.  Denies recent acute illness.  Denies chest pain shortness of breath palpitations GI  changes.  Having chronic persistence of the large joint mostly hips arthritis pain.    HEALTH RISK ASSESSMENT    Recent Hospitalizations:  No hospitalization(s) within the last year.    Current Medical Providers:  Patient Care Team:  Chapin Sánchez MD as PCP - General  Cynthia Gill APRN as PCP - Claims Attributed  Ze Carey MD as Consulting Physician (Urology)    Smoking Status:  Social History     Tobacco Use   Smoking Status Former Smoker   • Types: Cigarettes   • Last attempt to quit: 1976   • Years since quittin.2   Smokeless Tobacco Never Used       Alcohol Consumption:  Social History     Substance and Sexual Activity   Alcohol Use No       Depression Screen:   PHQ-2/PHQ-9 Depression Screening 2019   Little interest or pleasure in doing things 0   Feeling down, depressed, or hopeless 0   Trouble falling or staying asleep, or sleeping too much -   Feeling tired or having little energy -   Poor appetite or overeating -   Feeling bad about yourself - or that you are a failure or have let yourself or your family down -   Trouble concentrating on things, such as reading the newspaper or watching television -   Moving or speaking so slowly that other people could have noticed. Or the opposite - being so fidgety or restless  that you have been moving around a lot more than usual -   Thoughts that you would be better off dead, or of hurting yourself in some way -   Total Score 0   If you checked off any problems, how difficult have these problems made it for you to do your work, take care of things at home, or get along with other people? -       Fall Risk Screen:  TRINY Fall Risk Assessment was completed, and patient is at LOW risk for falls.Assessment completed on:9/24/2019    Health Habits and Functional and Cognitive Screening:  Functional & Cognitive Status 9/24/2019   Do you have difficulty preparing food and eating? No   Do you have difficulty bathing yourself, getting dressed or grooming yourself? No   Do you have difficulty using the toilet? No   Do you have difficulty moving around from place to place? No   Do you have trouble with steps or getting out of a bed or a chair? No   Current Diet Well Balanced Diet   Dental Exam Up to date   Eye Exam Up to date   Exercise (times per week) 1 times per week   Current Exercise Activities Include Walking   Do you need help using the phone?  No   Are you deaf or do you have serious difficulty hearing?  No   Do you need help with transportation? No   Do you need help shopping? No   Do you need help preparing meals?  No   Do you need help with housework?  No   Do you need help with laundry? No   Do you need help taking your medications? No   Do you need help managing money? No   Do you ever drive or ride in a car without wearing a seat belt? No   Have you felt unusual stress, anger or loneliness in the last month? No   Who do you live with? Other   If you need help, do you have trouble finding someone available to you? No   Have you been bothered in the last four weeks by sexual problems? No   Do you have difficulty concentrating, remembering or making decisions? No         Does the patient have evidence of cognitive impairment? Yes    Asprin use counseling:Taking ASA appropriately as  indicated    Age-appropriate Screening Schedule:  Refer to the list below for future screening recommendations based on patient's age, sex and/or medical conditions. Orders for these recommended tests are listed in the plan section. The patient has been provided with a written plan.    Health Maintenance   Topic Date Due   • ZOSTER VACCINE (1 of 2) 10/03/1986   • INFLUENZA VACCINE  08/01/2019   • LIPID PANEL  06/05/2020   • TDAP/TD VACCINES (2 - Td) 09/06/2027   • PNEUMOCOCCAL VACCINES (65+ LOW/MEDIUM RISK)  Completed          The following portions of the patient's history were reviewed and updated as appropriate: allergies, past family history, past medical history, past social history, past surgical history and problem list.    Outpatient Medications Prior to Visit   Medication Sig Dispense Refill   • aspirin 81 MG tablet Take  by mouth daily.     • clopidogrel (PLAVIX) 75 MG tablet Take 1 tablet by mouth Daily. 90 tablet 3   • diphenhydrAMINE-acetaminophen (TYLENOL PM)  MG tablet per tablet Take 1 tablet by mouth At Night As Needed for sleep.     • FIBER PO Take  by mouth Daily.     • Glucose Blood (ONETOUCH ULTRA BLUE VI) daily.     • lisinopril (PRINIVIL,ZESTRIL) 10 MG tablet Take 1 tablet by mouth Daily. 90 tablet 3   • metoprolol tartrate (LOPRESSOR) 25 MG tablet Take 1 tablet by mouth 2 (Two) Times a Day. 180 tablet 1   • Multiple Vitamins-Minerals (ONE-A-DAY MENS 50+ ADVANTAGE PO) Take  by mouth daily.     • pantoprazole (PROTONIX) 40 MG EC tablet Take 1 tablet by mouth Daily. 90 tablet 3   • rosuvastatin (CRESTOR) 20 MG tablet Take 1 tablet by mouth Daily. 90 tablet 3   • tamsulosin (FLOMAX) 0.4 MG capsule 24 hr capsule Take 1 capsule by mouth Daily. 90 capsule 1   • traMADol (ULTRAM) 50 MG tablet Take 1 tab every 8-12 hours as needed moderate pain 270 tablet 1   • escitalopram (LEXAPRO) 20 MG tablet Take 1 tablet by mouth Daily. 90 tablet 3     No facility-administered medications prior to visit.   "      Patient Active Problem List   Diagnosis   • Anemia   • Chronic coronary artery disease   • Chronic osteoarthritis   • Depression   • Elevated prostate specific antigen (PSA)   • Enlarged prostate   • Hyperlipidemia   • Essential hypertension   • Iron deficiency   • Low back pain   • Malignant neoplasm of prostate (CMS/HCC)   • Rheumatoid arthritis (CMS/HCC)   • Prostate cancer (CMS/HCC)       Advanced Care Planning:  Patient has an advance directive - a copy has been provided and is visible in patient header    Review of Systems    Compared to one year ago, the patient feels his physical health is better.  Compared to one year ago, the patient feels his mental health is the same.    Reviewed chart for potential of high risk medication in the elderly: yes  Reviewed chart for potential of harmful drug interactions in the elderly:yes    Objective         Vitals:    09/24/19 1128   BP: 140/68   BP Location: Left arm   Patient Position: Sitting   Cuff Size: Adult   Pulse: 82   Temp: 97.7 °F (36.5 °C)   TempSrc: Oral   SpO2: 98%   Weight: 82 kg (180 lb 11.2 oz)   Height: 177.8 cm (70\")       Body mass index is 25.93 kg/m².  Discussed the patient's BMI with him. The BMI is above average; BMI management plan is completed.    Physical Exam   Constitutional: He is oriented to person, place, and time. He appears well-developed and well-nourished.   HENT:   Head: Normocephalic.   Eyes: Conjunctivae and EOM are normal. Pupils are equal, round, and reactive to light.   Neck: Normal range of motion. Neck supple.   Cardiovascular: Normal rate, regular rhythm and normal heart sounds.   No murmur heard.  Pulmonary/Chest: Effort normal and breath sounds normal.   Musculoskeletal: He exhibits no edema.   Neurological: He is alert and oriented to person, place, and time.   Skin: Skin is warm and dry.   Psychiatric: He has a normal mood and affect.   Vitals reviewed.            Assessment/Plan   Medicare Risks and Personalized " Health Plan  CMS Preventative Services Quick Reference  Cardiovascular risk  Chronic Pain   Immunizations Discussed/Encouraged (specific immunizations; Influenza )    The above risks/problems have been discussed with the patient.  Pertinent information has been shared with the patient in the After Visit Summary.  Follow up plans and orders are seen below in the Assessment/Plan Section.    Diagnoses and all orders for this visit:    1. Flu vaccine need (Primary)  -     Fluad Quad >65 years    2. Chronic osteoarthritis  -     methylPREDNISolone acetate (DEPO-medrol) injection 40 mg  -     methylPREDNISolone acetate (DEPO-medrol) injection 80 mg    3. Essential hypertension    4. Mixed hyperlipidemia    5. Medicare annual wellness visit, subsequent    6. Major depressive disorder with single episode, in partial remission (CMS/[facility])  -     escitalopram (LEXAPRO) 20 MG tablet; Take 1 tablet by mouth Daily.  Dispense: 90 tablet; Refill: 3    7. Mild cognitive impairment with memory loss  -     donepezil (ARICEPT) 5 MG tablet; Take 1 tablet by mouth Every Night.  Dispense: 90 tablet; Refill: 0    Other orders  -     Cancel: mupirocin (BACTROBAN) 2 % ointment  -     mupirocin (BACTROBAN) 2 % ointment; Apply  topically to the appropriate area as directed 3 (Three) Times a Day.  Dispense: 15 g; Refill: 1    Vaccines injections meds.  Counseled.  ACE score 16.  Quite low.  Will initiate low-dose Aricept each evening.  Recheck in 6 weeks.  Counseled about potential benefits side effects.  Consider imaging but you are not reluctant at this time.  Continue all other medications same.  Maintain heart healthy diet.  Follow Up:  No Follow-up on file.     An After Visit Summary and PPPS were given to the patient.

## 2019-10-31 ENCOUNTER — OFFICE VISIT (OUTPATIENT)
Dept: FAMILY MEDICINE CLINIC | Facility: CLINIC | Age: 84
End: 2019-10-31

## 2019-10-31 VITALS
HEART RATE: 74 BPM | DIASTOLIC BLOOD PRESSURE: 64 MMHG | TEMPERATURE: 98 F | OXYGEN SATURATION: 98 % | SYSTOLIC BLOOD PRESSURE: 140 MMHG | BODY MASS INDEX: 25.54 KG/M2 | HEIGHT: 70 IN | WEIGHT: 178.4 LBS

## 2019-10-31 DIAGNOSIS — I10 ESSENTIAL HYPERTENSION: ICD-10-CM

## 2019-10-31 DIAGNOSIS — G31.84 MILD COGNITIVE IMPAIRMENT WITH MEMORY LOSS: Primary | ICD-10-CM

## 2019-10-31 PROCEDURE — 99213 OFFICE O/P EST LOW 20 MIN: CPT | Performed by: FAMILY MEDICINE

## 2019-10-31 RX ORDER — DONEPEZIL HYDROCHLORIDE 5 MG/1
5 TABLET, FILM COATED ORAL NIGHTLY
Qty: 90 TABLET | Refills: 1 | Status: SHIPPED | OUTPATIENT
Start: 2019-10-31 | End: 2020-03-13 | Stop reason: SDUPTHER

## 2019-10-31 NOTE — PROGRESS NOTES
Subjective   Rubén Rivas is a 84 y.o. male.     History of Present Illness follow-up regarding hypertension.  Medication evaluation regarding the mild cognitive impairment.  Here with daughter.  Both feel that things are no worse.  No reports of adverse effects.  No recent acute illness.  Denies respiratory CDV  GI changes.  Maintains follow-up with cardiology.  Voices concerns regarding challenges with family most are expected.    The following portions of the patient's history were reviewed and updated as appropriate: allergies, past family history, past medical history, past social history, past surgical history and problem list.    Review of Systems  See history of Present Illness     Objective     Physical Exam   Constitutional: He is oriented to person, place, and time. He appears well-developed and well-nourished.   HENT:   Head: Normocephalic.   Cardiovascular: Normal rate, regular rhythm and normal heart sounds.   No murmur heard.  Pulmonary/Chest: Effort normal and breath sounds normal.   Musculoskeletal: He exhibits no edema.   Neurological: He is alert and oriented to person, place, and time.   Skin: Skin is warm and dry.   Psychiatric: He has a normal mood and affect.   Vitals reviewed.      PHQ-9 Total Score:      Patient's Body mass index is 25.6 kg/m². BMI is above normal parameters. Recommendations include: none (medical contraindication).   (Normal BMI:  18.5-24.9, OW 25-29.9, Obesity 30 or greater)      Assessment/Plan     Rubén was seen today for essential hypertension and medication check.    Diagnoses and all orders for this visit:    Mild cognitive impairment with memory loss  -     donepezil (ARICEPT) 5 MG tablet; Take 1 tablet by mouth Every Night.    Essential hypertension    Counseled.  Continue medications same.  We will ask you to recheck in about 3 to 4 months or as needed.  You are very current on desired PME.                     This document has been electronically signed by Chapin  MULU Sánchez MD   October 31, 2019 11:14 AM

## 2019-12-02 RX ORDER — TAMSULOSIN HYDROCHLORIDE 0.4 MG/1
CAPSULE ORAL
Qty: 90 CAPSULE | Refills: 0 | Status: SHIPPED | OUTPATIENT
Start: 2019-12-02 | End: 2020-04-15 | Stop reason: SDUPTHER

## 2019-12-12 RX ORDER — ROSUVASTATIN CALCIUM 20 MG/1
20 TABLET, COATED ORAL DAILY
Qty: 90 TABLET | Refills: 3 | Status: SHIPPED | OUTPATIENT
Start: 2019-12-12 | End: 2019-12-13 | Stop reason: SDUPTHER

## 2019-12-13 ENCOUNTER — TELEPHONE (OUTPATIENT)
Dept: FAMILY MEDICINE CLINIC | Facility: CLINIC | Age: 84
End: 2019-12-13

## 2019-12-13 DIAGNOSIS — E78.2 MIXED HYPERLIPIDEMIA: Primary | ICD-10-CM

## 2019-12-13 RX ORDER — ROSUVASTATIN CALCIUM 20 MG/1
20 TABLET, COATED ORAL DAILY
Qty: 90 TABLET | Refills: 3 | Status: SHIPPED | OUTPATIENT
Start: 2019-12-13 | End: 2020-06-02 | Stop reason: SDUPTHER

## 2019-12-13 NOTE — TELEPHONE ENCOUNTER
Request for refill sent to [facility].      Rosuvastatin  12-12-19 #90 refill x 3    Pharmacy states not received.

## 2020-01-20 DIAGNOSIS — M19.90 CHRONIC OSTEOARTHRITIS: ICD-10-CM

## 2020-01-21 RX ORDER — TRAMADOL HYDROCHLORIDE 50 MG/1
TABLET ORAL
Qty: 270 TABLET | Refills: 1 | Status: SHIPPED | OUTPATIENT
Start: 2020-01-21 | End: 2020-09-03

## 2020-02-03 ENCOUNTER — OFFICE VISIT (OUTPATIENT)
Dept: FAMILY MEDICINE CLINIC | Facility: CLINIC | Age: 85
End: 2020-02-03

## 2020-02-03 VITALS
TEMPERATURE: 97.2 F | WEIGHT: 171.8 LBS | HEIGHT: 70 IN | HEART RATE: 68 BPM | SYSTOLIC BLOOD PRESSURE: 116 MMHG | BODY MASS INDEX: 24.6 KG/M2 | OXYGEN SATURATION: 98 % | DIASTOLIC BLOOD PRESSURE: 50 MMHG

## 2020-02-03 DIAGNOSIS — M19.90 CHRONIC OSTEOARTHRITIS: Primary | ICD-10-CM

## 2020-02-03 DIAGNOSIS — G31.84 MILD COGNITIVE IMPAIRMENT WITH MEMORY LOSS: ICD-10-CM

## 2020-02-03 DIAGNOSIS — R19.7 DIARRHEA, UNSPECIFIED TYPE: ICD-10-CM

## 2020-02-03 PROCEDURE — 99213 OFFICE O/P EST LOW 20 MIN: CPT | Performed by: FAMILY MEDICINE

## 2020-02-03 PROCEDURE — 96372 THER/PROPH/DIAG INJ SC/IM: CPT | Performed by: FAMILY MEDICINE

## 2020-02-03 RX ORDER — METHYLPREDNISOLONE ACETATE 80 MG/ML
80 INJECTION, SUSPENSION INTRA-ARTICULAR; INTRALESIONAL; INTRAMUSCULAR; SOFT TISSUE ONCE
Status: COMPLETED | OUTPATIENT
Start: 2020-02-03 | End: 2020-02-03

## 2020-02-03 RX ORDER — METHYLPREDNISOLONE ACETATE 40 MG/ML
40 INJECTION, SUSPENSION INTRA-ARTICULAR; INTRALESIONAL; INTRAMUSCULAR; SOFT TISSUE ONCE
Status: COMPLETED | OUTPATIENT
Start: 2020-02-03 | End: 2020-02-03

## 2020-02-03 RX ORDER — DIPHENOXYLATE HYDROCHLORIDE AND ATROPINE SULFATE 2.5; .025 MG/1; MG/1
1 TABLET ORAL 4 TIMES DAILY PRN
Qty: 30 TABLET | Refills: 0 | Status: SHIPPED | OUTPATIENT
Start: 2020-02-03 | End: 2020-09-16

## 2020-02-03 RX ADMIN — METHYLPREDNISOLONE ACETATE 40 MG: 40 INJECTION, SUSPENSION INTRA-ARTICULAR; INTRALESIONAL; INTRAMUSCULAR; SOFT TISSUE at 11:53

## 2020-02-03 RX ADMIN — METHYLPREDNISOLONE ACETATE 80 MG: 80 INJECTION, SUSPENSION INTRA-ARTICULAR; INTRALESIONAL; INTRAMUSCULAR; SOFT TISSUE at 11:52

## 2020-02-03 NOTE — PROGRESS NOTES
Subjective   Rubén Rivas is a 84 y.o. male.     History of Present Illness follow-up regarding arthritis memory impairment.  Has been having some challenges with the chronic episodic diarrhea.  Has had visits with urology cardiology.  The steroid injection very helpful for about 8 to 10 weeks.  Denies recent acute illness.  Denies respiratory CDV .  He and spouse both think the low-dose Aricept has been helpful without any perceived side effects.  Appetite labile but was prior to.  Diarrhea was prior to the Aricept.  Main concern is the arthritic pain.    The following portions of the patient's history were reviewed and updated as appropriate: allergies, current medications, past medical history, past social history, past surgical history and problem list.    Review of Systems  See history of Present Illness     Objective     Physical Exam   Constitutional: He is oriented to person, place, and time. He appears well-developed and well-nourished.   HENT:   Head: Normocephalic.   Cardiovascular: Normal rate, regular rhythm and normal heart sounds.   No murmur heard.  Pulmonary/Chest: Effort normal and breath sounds normal.   Neurological: He is alert and oriented to person, place, and time.   Skin: Skin is warm and dry.   Psychiatric: He has a normal mood and affect.   Vitals reviewed.      PHQ-9 Total Score:      Patient's Body mass index is 24.65 kg/m². BMI is within normal parameters. No follow-up required..   (Normal BMI:  18.5-24.9, OW 25-29.9, Obesity 30 or greater)      Assessment/Plan     Rubén was seen today for memory loss.    Diagnoses and all orders for this visit:    Chronic osteoarthritis  -     methylPREDNISolone acetate (DEPO-medrol) injection 40 mg  -     methylPREDNISolone acetate (DEPO-medrol) injection 80 mg    Mild cognitive impairment with memory loss    Diarrhea, unspecified type  -     diphenoxylate-atropine (LOMOTIL) 2.5-0.025 MG per tablet; Take 1 tablet by mouth 4 (Four) Times a Day As  Needed for Diarrhea.    Counseled.  Will leave the Aricept at current dose.  Steroid injection authorized.  Make available Lomotil to have for episodic diarrhea.  They both state can be unpredictable but also is not daily and may be several days to a few weeks between episodes.  Continue all other medications as reconciled ordered.  Stay safely active.  Liberalize diet to help maintain weight.  Recheck in 2 months.  Contemplate increasing the Aricept then.  Consider another injection then.                     This document has been electronically signed by Chapin Sánchez MD   February 3, 2020 2:10 PM

## 2020-03-13 DIAGNOSIS — G31.84 MILD COGNITIVE IMPAIRMENT WITH MEMORY LOSS: ICD-10-CM

## 2020-03-13 RX ORDER — DONEPEZIL HYDROCHLORIDE 5 MG/1
5 TABLET, FILM COATED ORAL NIGHTLY
Qty: 90 TABLET | Refills: 1 | Status: SHIPPED | OUTPATIENT
Start: 2020-03-13 | End: 2020-09-17

## 2020-03-13 NOTE — TELEPHONE ENCOUNTER
PATIENT'S DAUGHTER, NATALIA, CALLED AND REQUESTED A REFILL ON PT'S RX, donepezil (ARICEPT) 5 MG tablet.    [facility] ON [address] CONFIRMED    NATALIA'S CALLBACK # [phone]

## 2020-04-02 ENCOUNTER — TELEPHONE (OUTPATIENT)
Dept: FAMILY MEDICINE CLINIC | Facility: CLINIC | Age: 85
End: 2020-04-02

## 2020-04-02 NOTE — TELEPHONE ENCOUNTER
PATIENT DAUGHTER CALLED AND WANTS PATIENT TO BE SEEN. SHE STATES IT IS TIME FOR HIS INJECTION. HE IS IN PAIN. I ADVISED PATIENT WE ARE TRYING TO KEEP PATIENTS OUT OF THE OFFICE. SHE WANTED TO KNOW WHAT OPTIONS THEY HAVE IN REGARDS OF CORTISONE SHOT FOR HIS ARTHRITIS?

## 2020-04-07 NOTE — TELEPHONE ENCOUNTER
Will need to wait on shot till seeing patients in clinic again   should have pain med available to use

## 2020-04-15 RX ORDER — TAMSULOSIN HYDROCHLORIDE 0.4 MG/1
1 CAPSULE ORAL DAILY
Qty: 90 CAPSULE | Refills: 1 | Status: SHIPPED | OUTPATIENT
Start: 2020-04-15 | End: 2020-10-22

## 2020-04-15 NOTE — TELEPHONE ENCOUNTER
PT'S DAUGHTER CALLED TO REQUEST A REFILL FOR tamsulosin (FLOMAX) 0.4 MG capsule 24 hr capsule.    NATALIA'S CONTACT [phone]     PHARMACY VERIFIED [facility]

## 2020-04-17 DIAGNOSIS — F32.4 MAJOR DEPRESSIVE DISORDER WITH SINGLE EPISODE, IN PARTIAL REMISSION (HCC): ICD-10-CM

## 2020-04-17 RX ORDER — ESCITALOPRAM OXALATE 20 MG/1
20 TABLET ORAL DAILY
Qty: 90 TABLET | Refills: 3 | Status: SHIPPED | OUTPATIENT
Start: 2020-04-17 | End: 2021-05-06

## 2020-04-17 NOTE — TELEPHONE ENCOUNTER
Patients daughter is calling stating that the patient is needing refills on his lexapro 20mg. Please advise    Confirmed pharmacy    [facility] is on  [address]  [phone]

## 2020-04-27 RX ORDER — CLOPIDOGREL BISULFATE 75 MG/1
75 TABLET ORAL DAILY
Qty: 90 TABLET | Refills: 3 | Status: SHIPPED | OUTPATIENT
Start: 2020-04-27 | End: 2021-06-17 | Stop reason: SDUPTHER

## 2020-04-27 NOTE — TELEPHONE ENCOUNTER
PTS DAUGHTER CALLED STATING THAT HIS clopidogrel (PLAVIX) 75 MG tablet NEEDS TO BE REFILLED AND SENT IN THROUGH     [facility] [address] [place] [address] 92 - [phone]  - [phone] FX

## 2020-06-02 ENCOUNTER — OFFICE VISIT (OUTPATIENT)
Dept: FAMILY MEDICINE CLINIC | Facility: CLINIC | Age: 85
End: 2020-06-02

## 2020-06-02 VITALS
OXYGEN SATURATION: 98 % | BODY MASS INDEX: 24.68 KG/M2 | DIASTOLIC BLOOD PRESSURE: 57 MMHG | HEART RATE: 60 BPM | WEIGHT: 172.4 LBS | HEIGHT: 70 IN | TEMPERATURE: 98.4 F | SYSTOLIC BLOOD PRESSURE: 132 MMHG

## 2020-06-02 DIAGNOSIS — E78.2 MIXED HYPERLIPIDEMIA: ICD-10-CM

## 2020-06-02 DIAGNOSIS — I10 ESSENTIAL HYPERTENSION: Primary | ICD-10-CM

## 2020-06-02 DIAGNOSIS — M19.90 CHRONIC OSTEOARTHRITIS: ICD-10-CM

## 2020-06-02 PROCEDURE — 96372 THER/PROPH/DIAG INJ SC/IM: CPT | Performed by: FAMILY MEDICINE

## 2020-06-02 PROCEDURE — 36415 COLL VENOUS BLD VENIPUNCTURE: CPT | Performed by: FAMILY MEDICINE

## 2020-06-02 PROCEDURE — 85025 COMPLETE CBC W/AUTO DIFF WBC: CPT | Performed by: FAMILY MEDICINE

## 2020-06-02 PROCEDURE — 80053 COMPREHEN METABOLIC PANEL: CPT | Performed by: FAMILY MEDICINE

## 2020-06-02 PROCEDURE — 80061 LIPID PANEL: CPT | Performed by: FAMILY MEDICINE

## 2020-06-02 PROCEDURE — 99213 OFFICE O/P EST LOW 20 MIN: CPT | Performed by: FAMILY MEDICINE

## 2020-06-02 RX ORDER — METHYLPREDNISOLONE ACETATE 40 MG/ML
40 INJECTION, SUSPENSION INTRA-ARTICULAR; INTRALESIONAL; INTRAMUSCULAR; SOFT TISSUE ONCE
Status: COMPLETED | OUTPATIENT
Start: 2020-06-02 | End: 2020-06-02

## 2020-06-02 RX ORDER — ROSUVASTATIN CALCIUM 20 MG/1
20 TABLET, COATED ORAL DAILY
Qty: 90 TABLET | Refills: 3 | Status: SHIPPED | OUTPATIENT
Start: 2020-06-02 | End: 2021-07-06 | Stop reason: ALTCHOICE

## 2020-06-02 RX ORDER — METHYLPREDNISOLONE ACETATE 80 MG/ML
80 INJECTION, SUSPENSION INTRA-ARTICULAR; INTRALESIONAL; INTRAMUSCULAR; SOFT TISSUE ONCE
Status: COMPLETED | OUTPATIENT
Start: 2020-06-02 | End: 2020-06-02

## 2020-06-02 RX ADMIN — METHYLPREDNISOLONE ACETATE 40 MG: 40 INJECTION, SUSPENSION INTRA-ARTICULAR; INTRALESIONAL; INTRAMUSCULAR; SOFT TISSUE at 14:56

## 2020-06-02 RX ADMIN — METHYLPREDNISOLONE ACETATE 80 MG: 80 INJECTION, SUSPENSION INTRA-ARTICULAR; INTRALESIONAL; INTRAMUSCULAR; SOFT TISSUE at 14:57

## 2020-06-03 LAB
ALBUMIN SERPL-MCNC: 3.7 G/DL (ref 3.5–5.2)
ALBUMIN/GLOB SERPL: 1.3 G/DL
ALP SERPL-CCNC: 69 U/L (ref 39–117)
ALT SERPL W P-5'-P-CCNC: 22 U/L (ref 1–41)
ANION GAP SERPL CALCULATED.3IONS-SCNC: 8.6 MMOL/L (ref 5–15)
AST SERPL-CCNC: 20 U/L (ref 1–40)
BASOPHILS # BLD AUTO: 0.07 10*3/MM3 (ref 0–0.2)
BASOPHILS NFR BLD AUTO: 1 % (ref 0–1.5)
BILIRUB SERPL-MCNC: 0.2 MG/DL (ref 0.2–1.2)
BUN BLD-MCNC: 22 MG/DL (ref 8–23)
BUN/CREAT SERPL: 16.9 (ref 7–25)
CALCIUM SPEC-SCNC: 8.8 MG/DL (ref 8.6–10.5)
CHLORIDE SERPL-SCNC: 105 MMOL/L (ref 98–107)
CHOLEST SERPL-MCNC: 151 MG/DL (ref 0–200)
CO2 SERPL-SCNC: 25.4 MMOL/L (ref 22–29)
CREAT BLD-MCNC: 1.3 MG/DL (ref 0.76–1.27)
DEPRECATED RDW RBC AUTO: 43.1 FL (ref 37–54)
EOSINOPHIL # BLD AUTO: 0.3 10*3/MM3 (ref 0–0.4)
EOSINOPHIL NFR BLD AUTO: 4.3 % (ref 0.3–6.2)
ERYTHROCYTE [DISTWIDTH] IN BLOOD BY AUTOMATED COUNT: 12.7 % (ref 12.3–15.4)
GFR SERPL CREATININE-BSD FRML MDRD: 53 ML/MIN/1.73
GLOBULIN UR ELPH-MCNC: 2.9 GM/DL
GLUCOSE BLD-MCNC: 72 MG/DL (ref 65–99)
HCT VFR BLD AUTO: 27.3 % (ref 37.5–51)
HDLC SERPL-MCNC: 28 MG/DL (ref 40–60)
HGB BLD-MCNC: 9.2 G/DL (ref 13–17.7)
IMM GRANULOCYTES # BLD AUTO: 0.03 10*3/MM3 (ref 0–0.05)
IMM GRANULOCYTES NFR BLD AUTO: 0.4 % (ref 0–0.5)
LDLC SERPL CALC-MCNC: 66 MG/DL (ref 0–100)
LDLC/HDLC SERPL: 2.35 {RATIO}
LYMPHOCYTES # BLD AUTO: 2.22 10*3/MM3 (ref 0.7–3.1)
LYMPHOCYTES NFR BLD AUTO: 31.5 % (ref 19.6–45.3)
MCH RBC QN AUTO: 31 PG (ref 26.6–33)
MCHC RBC AUTO-ENTMCNC: 33.7 G/DL (ref 31.5–35.7)
MCV RBC AUTO: 91.9 FL (ref 79–97)
MONOCYTES # BLD AUTO: 0.67 10*3/MM3 (ref 0.1–0.9)
MONOCYTES NFR BLD AUTO: 9.5 % (ref 5–12)
NEUTROPHILS # BLD AUTO: 3.76 10*3/MM3 (ref 1.7–7)
NEUTROPHILS NFR BLD AUTO: 53.3 % (ref 42.7–76)
NRBC BLD AUTO-RTO: 0 /100 WBC (ref 0–0.2)
PLATELET # BLD AUTO: 233 10*3/MM3 (ref 140–450)
PMV BLD AUTO: 11.3 FL (ref 6–12)
POTASSIUM BLD-SCNC: 4.3 MMOL/L (ref 3.5–5.2)
PROT SERPL-MCNC: 6.6 G/DL (ref 6–8.5)
RBC # BLD AUTO: 2.97 10*6/MM3 (ref 4.14–5.8)
SODIUM BLD-SCNC: 139 MMOL/L (ref 136–145)
TRIGL SERPL-MCNC: 286 MG/DL (ref 0–150)
VLDLC SERPL-MCNC: 57.2 MG/DL (ref 5–40)
WBC NRBC COR # BLD: 7.05 10*3/MM3 (ref 3.4–10.8)

## 2020-06-08 NOTE — PROGRESS NOTES
Lab testing shows anemia to be worse.  I am unsure who he is seeing from hematology.  We need to arrange follow-up for him.

## 2020-07-10 ENCOUNTER — TELEPHONE (OUTPATIENT)
Dept: FAMILY MEDICINE CLINIC | Facility: CLINIC | Age: 85
End: 2020-07-10

## 2020-07-10 NOTE — TELEPHONE ENCOUNTER
NATALIA / DAUGHTER CALLED AND STATED THAT HER DAD / PATIENT HAS BEEN VOMITING AND IT IS GETTING WORSE.  NATALIA STATES THIS HAS BEEN GOING ON FOR ABOUT ONE YEAR.  NOW HE IS VOMITTING AT LEAST 2-3 TIMES A WEEK.      NATALIA IS NOT SURE WHAT DIRECTION TO GO AND WOULD LIKE RECOMMENDATIONS    NATALIA  [phone] SHE IS ON [address]

## 2020-07-14 NOTE — TELEPHONE ENCOUNTER
Called [phone] spoke with wife about Dr Sánchez instructions and she said she would talk to him and call us back.

## 2020-08-03 ENCOUNTER — HOSPITAL ENCOUNTER (OUTPATIENT)
Dept: ULTRASOUND IMAGING | Facility: HOSPITAL | Age: 85
Discharge: HOME | End: 2020-08-03
Admitting: FAMILY MEDICINE

## 2020-08-03 ENCOUNTER — OFFICE VISIT (OUTPATIENT)
Dept: FAMILY MEDICINE CLINIC | Facility: CLINIC | Age: 85
End: 2020-08-03

## 2020-08-03 ENCOUNTER — HOSPITAL ENCOUNTER (OUTPATIENT)
Dept: ULTRASOUND IMAGING | Facility: HOSPITAL | Age: 85
Discharge: HOME | End: 2020-08-03

## 2020-08-03 VITALS
OXYGEN SATURATION: 99 % | BODY MASS INDEX: 23.19 KG/M2 | HEIGHT: 70 IN | WEIGHT: 162 LBS | HEART RATE: 62 BPM | RESPIRATION RATE: 16 BRPM | SYSTOLIC BLOOD PRESSURE: 112 MMHG | TEMPERATURE: 98 F | DIASTOLIC BLOOD PRESSURE: 52 MMHG

## 2020-08-03 DIAGNOSIS — R11.2 NON-INTRACTABLE VOMITING WITH NAUSEA, UNSPECIFIED VOMITING TYPE: ICD-10-CM

## 2020-08-03 DIAGNOSIS — I25.10 CHRONIC CORONARY ARTERY DISEASE: ICD-10-CM

## 2020-08-03 DIAGNOSIS — R10.11 RIGHT UPPER QUADRANT ABDOMINAL PAIN: Primary | ICD-10-CM

## 2020-08-03 DIAGNOSIS — R10.11 RIGHT UPPER QUADRANT ABDOMINAL PAIN: ICD-10-CM

## 2020-08-03 PROCEDURE — 76705 ECHO EXAM OF ABDOMEN: CPT

## 2020-08-03 PROCEDURE — 80053 COMPREHEN METABOLIC PANEL: CPT | Performed by: FAMILY MEDICINE

## 2020-08-03 PROCEDURE — 85025 COMPLETE CBC W/AUTO DIFF WBC: CPT | Performed by: FAMILY MEDICINE

## 2020-08-03 PROCEDURE — 36415 COLL VENOUS BLD VENIPUNCTURE: CPT | Performed by: FAMILY MEDICINE

## 2020-08-03 PROCEDURE — 76705 ECHO EXAM OF ABDOMEN: CPT | Performed by: RADIOLOGY

## 2020-08-03 PROCEDURE — 99214 OFFICE O/P EST MOD 30 MIN: CPT | Performed by: FAMILY MEDICINE

## 2020-08-03 PROCEDURE — 83690 ASSAY OF LIPASE: CPT | Performed by: FAMILY MEDICINE

## 2020-08-03 NOTE — PROGRESS NOTES
Subjective   Rubén Rivas is a 84 y.o. male.     History of Present Illness routine follow-up regarding chronic conditions but unfortunately for the last 2 months essentially since fourth July has had significant abdominal symptoms right upper quadrant abdominal pain associated with almost any food intake.  Nausea vomiting associated.  Denies diarrhea.  Denies urinary symptoms.  Denies shortness of breath chest pain palpitations.  Start right upper quadrant radiates to chest up into throat.  Significant nausea vomiting associated.  Has led to becoming fearful to eat.    The following portions of the patient's history were reviewed and updated as appropriate: allergies, past family history, past medical history, past social history, past surgical history and problem list.    Review of Systems  See history of Present Illness     Objective     Physical Exam   Constitutional: He is oriented to person, place, and time. He appears well-developed and well-nourished.   HENT:   Head: Normocephalic.   Neck: Normal range of motion. Neck supple.   Cardiovascular: Normal rate, regular rhythm and normal heart sounds.   No murmur heard.  Pulmonary/Chest: Effort normal and breath sounds normal.   Abdominal: Soft. Bowel sounds are normal. There is tenderness. There is no rebound and no guarding.   Musculoskeletal: He exhibits no edema.   Neurological: He is alert and oriented to person, place, and time.   Skin: Skin is warm and dry.   Psychiatric: He has a normal mood and affect.   Vitals reviewed.      PHQ-9 Total Score:      Patient's Body mass index is 23.24 kg/m². BMI is within normal parameters. No follow-up required..   (Normal BMI:  18.5-24.9, OW 25-29.9, Obesity 30 or greater)      Assessment/Plan     Rubén was seen today for hypertension and heartburn.    Diagnoses and all orders for this visit:    Right upper quadrant abdominal pain  -     CBC & Differential  -     Comprehensive Metabolic Panel  -     Lipase  -     CBC Auto  Differential  -     US liver; Future  -     US Gallbladder; Future    Chronic coronary artery disease    Non-intractable vomiting with nausea, unspecified vomiting type  -     CBC & Differential  -     Comprehensive Metabolic Panel  -     Lipase  -     CBC Auto Differential  -     US liver; Future  -     US Gallbladder; Future    Check labs.  Ultrasound.  May be looking at chronic cholecystitis.  Other potentials of course.  Comorbidities significant.  Will notify of results.  Anticipate either surgical referral or GI referral.  Diet as tolerated stay well-hydrated.  Do not hesitate to utilize emergency room if things worsen of course.  Maintain pandemic response.  Did not give injection today.                     This document has been electronically signed by Chapin Sánchez MD   August 3, 2020 11:08

## 2020-08-04 DIAGNOSIS — K81.0 ACUTE CHOLECYSTITIS: ICD-10-CM

## 2020-08-04 DIAGNOSIS — K80.20 GALLSTONES: Primary | ICD-10-CM

## 2020-08-04 LAB
ALBUMIN SERPL-MCNC: 3.5 G/DL (ref 3.5–5.2)
ALBUMIN/GLOB SERPL: 1.2 G/DL
ALP SERPL-CCNC: 196 U/L (ref 39–117)
ALT SERPL W P-5'-P-CCNC: 122 U/L (ref 1–41)
ANION GAP SERPL CALCULATED.3IONS-SCNC: 8.9 MMOL/L (ref 5–15)
AST SERPL-CCNC: 78 U/L (ref 1–40)
BASOPHILS # BLD AUTO: 0.05 10*3/MM3 (ref 0–0.2)
BASOPHILS NFR BLD AUTO: 0.7 % (ref 0–1.5)
BILIRUB SERPL-MCNC: 0.6 MG/DL (ref 0–1.2)
BUN SERPL-MCNC: 22 MG/DL (ref 8–23)
BUN/CREAT SERPL: 15.2 (ref 7–25)
CALCIUM SPEC-SCNC: 9.3 MG/DL (ref 8.6–10.5)
CHLORIDE SERPL-SCNC: 102 MMOL/L (ref 98–107)
CO2 SERPL-SCNC: 26.1 MMOL/L (ref 22–29)
CREAT SERPL-MCNC: 1.45 MG/DL (ref 0.76–1.27)
DEPRECATED RDW RBC AUTO: 43.8 FL (ref 37–54)
EOSINOPHIL # BLD AUTO: 0.18 10*3/MM3 (ref 0–0.4)
EOSINOPHIL NFR BLD AUTO: 2.6 % (ref 0.3–6.2)
ERYTHROCYTE [DISTWIDTH] IN BLOOD BY AUTOMATED COUNT: 13.2 % (ref 12.3–15.4)
GFR SERPL CREATININE-BSD FRML MDRD: 46 ML/MIN/1.73
GLOBULIN UR ELPH-MCNC: 3 GM/DL
GLUCOSE SERPL-MCNC: 134 MG/DL (ref 65–99)
HCT VFR BLD AUTO: 26.9 % (ref 37.5–51)
HGB BLD-MCNC: 9.1 G/DL (ref 13–17.7)
IMM GRANULOCYTES # BLD AUTO: 0.03 10*3/MM3 (ref 0–0.05)
IMM GRANULOCYTES NFR BLD AUTO: 0.4 % (ref 0–0.5)
LIPASE SERPL-CCNC: 22 U/L (ref 13–60)
LYMPHOCYTES # BLD AUTO: 1.08 10*3/MM3 (ref 0.7–3.1)
LYMPHOCYTES NFR BLD AUTO: 15.4 % (ref 19.6–45.3)
MCH RBC QN AUTO: 30.3 PG (ref 26.6–33)
MCHC RBC AUTO-ENTMCNC: 33.8 G/DL (ref 31.5–35.7)
MCV RBC AUTO: 89.7 FL (ref 79–97)
MONOCYTES # BLD AUTO: 0.56 10*3/MM3 (ref 0.1–0.9)
MONOCYTES NFR BLD AUTO: 8 % (ref 5–12)
NEUTROPHILS NFR BLD AUTO: 5.12 10*3/MM3 (ref 1.7–7)
NEUTROPHILS NFR BLD AUTO: 72.9 % (ref 42.7–76)
NRBC BLD AUTO-RTO: 0 /100 WBC (ref 0–0.2)
PLATELET # BLD AUTO: 229 10*3/MM3 (ref 140–450)
PMV BLD AUTO: 11.6 FL (ref 6–12)
POTASSIUM SERPL-SCNC: 4.1 MMOL/L (ref 3.5–5.2)
PROT SERPL-MCNC: 6.5 G/DL (ref 6–8.5)
RBC # BLD AUTO: 3 10*6/MM3 (ref 4.14–5.8)
SODIUM SERPL-SCNC: 137 MMOL/L (ref 136–145)
WBC # BLD AUTO: 7.02 10*3/MM3 (ref 3.4–10.8)

## 2020-08-04 NOTE — PROGRESS NOTES
Blood testing also consistent with gallbladder.  Hopefully will be seeing surgeon soon.  Be sure referral to general surgery is in place

## 2020-09-02 DIAGNOSIS — M19.90 CHRONIC OSTEOARTHRITIS: ICD-10-CM

## 2020-09-03 RX ORDER — TRAMADOL HYDROCHLORIDE 50 MG/1
TABLET ORAL
Qty: 90 TABLET | Refills: 0 | Status: SHIPPED | OUTPATIENT
Start: 2020-09-03 | End: 2020-10-22

## 2020-09-10 ENCOUNTER — LAB (OUTPATIENT)
Dept: UROLOGY | Facility: CLINIC | Age: 85
End: 2020-09-10

## 2020-09-10 DIAGNOSIS — C61 PROSTATE CANCER (HCC): Primary | ICD-10-CM

## 2020-09-10 PROCEDURE — 84153 ASSAY OF PSA TOTAL: CPT | Performed by: UROLOGY

## 2020-09-10 PROCEDURE — 36415 COLL VENOUS BLD VENIPUNCTURE: CPT | Performed by: UROLOGY

## 2020-09-11 DIAGNOSIS — D64.9 ANEMIA, UNSPECIFIED TYPE: ICD-10-CM

## 2020-09-11 DIAGNOSIS — M19.90 CHRONIC OSTEOARTHRITIS: ICD-10-CM

## 2020-09-11 LAB — PSA SERPL-MCNC: <0.014 NG/ML (ref 0–4)

## 2020-09-11 NOTE — TELEPHONE ENCOUNTER
PATIENT REQUESTING A NEW PRESCRIPTION TO BE SENT TO [facility] FOR PANTOPRAZOLE. IT WAS LAST SENT TO [facility].

## 2020-09-12 RX ORDER — PANTOPRAZOLE SODIUM 40 MG/1
40 TABLET, DELAYED RELEASE ORAL DAILY
Qty: 90 TABLET | Refills: 3 | Status: SHIPPED | OUTPATIENT
Start: 2020-09-12 | End: 2020-10-23

## 2020-09-16 ENCOUNTER — OFFICE VISIT (OUTPATIENT)
Dept: UROLOGY | Facility: CLINIC | Age: 85
End: 2020-09-16

## 2020-09-16 VITALS — TEMPERATURE: 98 F | BODY MASS INDEX: 22.94 KG/M2 | HEIGHT: 70 IN | WEIGHT: 160.2 LBS

## 2020-09-16 DIAGNOSIS — C61 PROSTATE CANCER (HCC): Primary | ICD-10-CM

## 2020-09-16 PROCEDURE — 99213 OFFICE O/P EST LOW 20 MIN: CPT | Performed by: UROLOGY

## 2020-09-16 RX ORDER — AMLODIPINE BESYLATE 5 MG/1
5 TABLET ORAL DAILY
COMMUNITY
Start: 2020-06-25 | End: 2022-03-28 | Stop reason: SDUPTHER

## 2020-09-16 NOTE — PROGRESS NOTES
Chief Complaint:          Prostate cancer.    HPI:   84 y.o. male.  His psa is 0.014 this month.    Pt had his cancer diagnosed in 2014 and he had 12/12 biopsies positive for carmita score of 7 stage T2b.  He had radiation therapy that finished up in 2015.  He had 2 years of lupron injections.  His last lupron injections was September in 2016 for 2 years of lupron.  HPI      Past Medical History:        Past Medical History:   Diagnosis Date   • Anemia    • Arthritis    • Depression    • Hyperlipidemia    • Hypertension    • Prostate cancer (CMS/HCC) 2013         Current Meds:     Current Outpatient Medications   Medication Sig Dispense Refill   • amLODIPine (NORVASC) 5 MG tablet Take 5 mg by mouth Daily. as directed     • aspirin 81 MG tablet Take  by mouth daily.     • clopidogrel (PLAVIX) 75 MG tablet Take 1 tablet by mouth Daily. 90 tablet 3   • diphenhydrAMINE-acetaminophen (TYLENOL PM)  MG tablet per tablet Take 1 tablet by mouth At Night As Needed for sleep.     • donepezil (ARICEPT) 5 MG tablet Take 1 tablet by mouth Every Night. 90 tablet 1   • escitalopram (LEXAPRO) 20 MG tablet Take 1 tablet by mouth Daily. 90 tablet 3   • FIBER PO Take  by mouth Daily.     • Glucose Blood (ONETOUCH ULTRA BLUE VI) daily.     • lisinopril (PRINIVIL,ZESTRIL) 10 MG tablet Take 1 tablet by mouth Daily. 90 tablet 3   • metoprolol tartrate (LOPRESSOR) 25 MG tablet Take 1 tablet by mouth 2 (Two) Times a Day. 180 tablet 1   • Multiple Vitamins-Minerals (ONE-A-DAY MENS 50+ ADVANTAGE PO) Take  by mouth daily.     • mupirocin (BACTROBAN) 2 % ointment Apply  topically to the appropriate area as directed 3 (Three) Times a Day. 15 g 1   • pantoprazole (PROTONIX) 40 MG EC tablet Take 1 tablet by mouth Daily. 90 tablet 3   • rosuvastatin (CRESTOR) 20 MG tablet Take 1 tablet by mouth Daily. 90 tablet 3   • tamsulosin (FLOMAX) 0.4 MG capsule 24 hr capsule Take 1 capsule by mouth Daily. 90 capsule 1   • traMADol (ULTRAM) 50 MG tablet  TAKE 1 TABLET BY MOUTH EVERY 8 TO 12 HOURS AS NEEDED FOR MODERATE PAIN 90 tablet 0     No current facility-administered medications for this visit.         Allergies:      Allergies   Allergen Reactions   • Sulfa Antibiotics         Past Surgical History:     Past Surgical History:   Procedure Laterality Date   • CARDIAC SURGERY     • HERNIA REPAIR     • LAPAROSCOPIC CHOLECYSTECTOMY  2020    PERFORMED AT [facility]   • STOMACH SURGERY           Social History:     Social History     Socioeconomic History   • Marital status:      Spouse name: Not on file   • Number of children: Not on file   • Years of education: Not on file   • Highest education level: Not on file   Tobacco Use   • Smoking status: Former Smoker     Types: Cigarettes     Quit date: 1976     Years since quittin.1   • Smokeless tobacco: Never Used   Substance and Sexual Activity   • Alcohol use: No   • Drug use: No   • Sexual activity: Defer       Family History:     Family History   Problem Relation Age of Onset   • Cancer Mother 35        breast   • Heart attack Father    • Cancer Sister         cervical   • Heart attack Brother    • Heart disease Brother    • Cancer Brother         Prostate       Review of Systems:     Review of Systems   Constitutional: Negative for chills, fatigue and fever.   HENT: Negative for congestion and sinus pressure.    Eyes: Negative for pain and redness.   Respiratory: Negative for chest tightness and shortness of breath.    Cardiovascular: Negative for chest pain.   Gastrointestinal: Positive for constipation. Negative for abdominal pain, diarrhea, nausea and vomiting.   Genitourinary: Negative for dysuria, flank pain, frequency, hematuria and urgency.   Musculoskeletal: Negative for back pain and neck pain.   Skin: Negative for rash.   Allergic/Immunologic: Negative for food allergies.   Neurological: Negative for dizziness and headaches.   Hematological: Does not bruise/bleed easily.    Psychiatric/Behavioral: The patient is not nervous/anxious.            I have reviewed the follow portions of the patient's history and confirmed they are accurate today:  allergies, current medications, past family history, past medical history, past social history, past surgical history, problem list and ROS  Physical Exam:     Physical Exam  Vitals signs and nursing note reviewed.   HENT:      Head: Normocephalic and atraumatic.      Right Ear: External ear normal.      Left Ear: External ear normal.      Nose: Nose normal.   Eyes:      Conjunctiva/sclera: Conjunctivae normal.      Pupils: Pupils are equal, round, and reactive to light.   Neck:      Musculoskeletal: Normal range of motion and neck supple.      Thyroid: No thyromegaly.   Cardiovascular:      Rate and Rhythm: Normal rate and regular rhythm.      Heart sounds: Normal heart sounds. No murmur.   Pulmonary:      Effort: Pulmonary effort is normal. No respiratory distress.      Breath sounds: Normal breath sounds. No wheezing or rales.   Chest:      Chest wall: No tenderness.   Abdominal:      General: Bowel sounds are normal. There is no distension.      Palpations: Abdomen is soft. There is no mass.      Tenderness: There is no abdominal tenderness.      Hernia: No hernia is present.   Genitourinary:     Penis: Normal.       Prostate: Normal.      Rectum: Normal.      Comments: Flat prostate  Musculoskeletal: Normal range of motion.         General: No tenderness.   Lymphadenopathy:      Cervical: No cervical adenopathy.   Skin:     General: Skin is warm.      Findings: No rash.   Neurological:      Mental Status: He is alert and oriented to person, place, and time.      Cranial Nerves: No cranial nerve deficit.      Motor: No abnormal muscle tone.      Coordination: Coordination normal.   Psychiatric:         Behavior: Behavior normal.         Thought Content: Thought content normal.         Judgment: Judgment normal.         Temp 98 °F (36.7 °C)   " Ht 177.8 cm (70\")   Wt 72.7 kg (160 lb 3.2 oz)   BMI 22.99 kg/m²    Procedure:         Assessment:     Encounter Diagnosis   Name Primary?   • Prostate cancer (CMS/HCC) Yes       Orders Placed This Encounter   Procedures   • PSA DIAGNOSTIC     Standing Status:   Future     Standing Expiration Date:   9/16/2021       Patient reports that he is not currently experiencing any symptoms of urinary incontinence.      Plan:   Will see pt back in a year.    Patient's Body mass index is 22.99 kg/m². BMI is within normal parameters. No follow-up required..      Smoking Cessation Counseling:  Never a smoker.  Patient does not currently use any tobacco products.     Counseling was given to patient for the following topics diagnostic results including: psa. The interim medical history and current results were reviewed.  A treatment plan with follow-up was made for Prostate cancer (CMS/HCC) [C61]. I spent 21 minutes face to face with Rubén Rivas and 80 percentage was spent in counseling.       This document has been electronically signed by Ze Carey MD September 16, 2020 09:19 EDT      "

## 2020-09-17 DIAGNOSIS — G31.84 MILD COGNITIVE IMPAIRMENT WITH MEMORY LOSS: ICD-10-CM

## 2020-09-17 RX ORDER — DONEPEZIL HYDROCHLORIDE 5 MG/1
TABLET, FILM COATED ORAL
Qty: 90 TABLET | Refills: 0 | Status: SHIPPED | OUTPATIENT
Start: 2020-09-17 | End: 2020-12-29

## 2020-10-16 ENCOUNTER — OFFICE VISIT (OUTPATIENT)
Dept: FAMILY MEDICINE CLINIC | Facility: CLINIC | Age: 85
End: 2020-10-16

## 2020-10-16 VITALS
HEART RATE: 69 BPM | OXYGEN SATURATION: 98 % | DIASTOLIC BLOOD PRESSURE: 70 MMHG | TEMPERATURE: 97.3 F | WEIGHT: 154 LBS | HEIGHT: 70 IN | SYSTOLIC BLOOD PRESSURE: 130 MMHG | BODY MASS INDEX: 22.05 KG/M2 | RESPIRATION RATE: 18 BRPM

## 2020-10-16 DIAGNOSIS — G89.29 CHRONIC LOW BACK PAIN, UNSPECIFIED BACK PAIN LATERALITY, UNSPECIFIED WHETHER SCIATICA PRESENT: ICD-10-CM

## 2020-10-16 DIAGNOSIS — R11.14 BILIOUS VOMITING WITH NAUSEA: Primary | ICD-10-CM

## 2020-10-16 DIAGNOSIS — M54.50 CHRONIC LOW BACK PAIN, UNSPECIFIED BACK PAIN LATERALITY, UNSPECIFIED WHETHER SCIATICA PRESENT: ICD-10-CM

## 2020-10-16 DIAGNOSIS — R63.4 WEIGHT LOSS: ICD-10-CM

## 2020-10-16 PROCEDURE — 96372 THER/PROPH/DIAG INJ SC/IM: CPT | Performed by: NURSE PRACTITIONER

## 2020-10-16 PROCEDURE — 85025 COMPLETE CBC W/AUTO DIFF WBC: CPT | Performed by: NURSE PRACTITIONER

## 2020-10-16 PROCEDURE — 84443 ASSAY THYROID STIM HORMONE: CPT | Performed by: NURSE PRACTITIONER

## 2020-10-16 PROCEDURE — 80053 COMPREHEN METABOLIC PANEL: CPT | Performed by: NURSE PRACTITIONER

## 2020-10-16 PROCEDURE — 36415 COLL VENOUS BLD VENIPUNCTURE: CPT | Performed by: NURSE PRACTITIONER

## 2020-10-16 PROCEDURE — 99214 OFFICE O/P EST MOD 30 MIN: CPT | Performed by: NURSE PRACTITIONER

## 2020-10-16 RX ORDER — METHYLPREDNISOLONE ACETATE 80 MG/ML
80 INJECTION, SUSPENSION INTRA-ARTICULAR; INTRALESIONAL; INTRAMUSCULAR; SOFT TISSUE ONCE
Status: COMPLETED | OUTPATIENT
Start: 2020-10-16 | End: 2020-10-16

## 2020-10-16 RX ORDER — METHYLPREDNISOLONE ACETATE 40 MG/ML
40 INJECTION, SUSPENSION INTRA-ARTICULAR; INTRALESIONAL; INTRAMUSCULAR; SOFT TISSUE ONCE
Status: COMPLETED | OUTPATIENT
Start: 2020-10-16 | End: 2020-10-16

## 2020-10-16 RX ORDER — MEGESTROL ACETATE 125 MG/ML
625 SUSPENSION ORAL DAILY
Qty: 150 ML | Refills: 0 | Status: SHIPPED | OUTPATIENT
Start: 2020-10-16 | End: 2020-11-05 | Stop reason: SDUPTHER

## 2020-10-16 RX ORDER — CHOLESTYRAMINE LIGHT 4 G/5.7G
4 POWDER, FOR SUSPENSION ORAL 2 TIMES DAILY
Qty: 60 EACH | Refills: 0 | Status: SHIPPED | OUTPATIENT
Start: 2020-10-16 | End: 2020-10-29

## 2020-10-16 RX ADMIN — METHYLPREDNISOLONE ACETATE 80 MG: 80 INJECTION, SUSPENSION INTRA-ARTICULAR; INTRALESIONAL; INTRAMUSCULAR; SOFT TISSUE at 15:28

## 2020-10-16 RX ADMIN — METHYLPREDNISOLONE ACETATE 40 MG: 40 INJECTION, SUSPENSION INTRA-ARTICULAR; INTRALESIONAL; INTRAMUSCULAR; SOFT TISSUE at 15:27

## 2020-10-16 NOTE — PROGRESS NOTES
Subjective   Rubén Rivas is a 85 y.o. male.     Chief Complaint   Patient presents with   • Weight Loss       He presents with c/o nausea and vomiting for the past few months. He recently had his gallbladder removed. He states he gets dry mouth. His wife is concerned that he has lost so much weight and does not have an appetite. She states he was up all night vomiting on Sunday night and on Monday. He states he is sick for a few days when he gets an attack. He states he still gets pain. She states he was so sick Sunday night that he was sweating. He denies diarrhea and constipation. He states he is throwing up bile like hot water comes up in his throat. His wife states he wants to sit in his chair with his eyes closed and he doesn't want to talk.        The following portions of the patient's history were reviewed and updated as appropriate: allergies, current medications, past family history, past medical history, past social history, past surgical history and problem list.    Review of Systems   Constitutional: Positive for appetite change and diaphoresis. Negative for fever and unexpected weight change.   HENT: Negative for ear pain, rhinorrhea, sore throat and trouble swallowing.    Eyes: Negative for visual disturbance.   Respiratory: Negative for cough, shortness of breath and wheezing.    Cardiovascular: Negative for chest pain and palpitations.   Gastrointestinal: Positive for abdominal pain, nausea and vomiting. Negative for blood in stool, constipation and diarrhea.   Genitourinary: Negative for dysuria and hematuria.   Musculoskeletal: Negative for arthralgias and myalgias.   Skin: Negative for color change.   Allergic/Immunologic: Negative for environmental allergies.   Neurological: Negative for dizziness.   Hematological: Negative for adenopathy.   Psychiatric/Behavioral: Negative for sleep disturbance and suicidal ideas. The patient is nervous/anxious (depression).        Objective     /70 (BP  "Location: Right arm, Patient Position: Sitting, Cuff Size: Adult)   Pulse 69   Temp 97.3 °F (36.3 °C) (Temporal)   Resp 18   Ht 177.8 cm (70\")   Wt 69.9 kg (154 lb)   SpO2 98%   BMI 22.10 kg/m²     Physical Exam  Vitals signs reviewed.   Constitutional:       General: He is not in acute distress.     Appearance: He is well-developed. He is not diaphoretic.   HENT:      Head: Normocephalic and atraumatic.      Mouth/Throat:      Mouth: Mucous membranes are moist.   Cardiovascular:      Rate and Rhythm: Normal rate and regular rhythm.      Heart sounds: Normal heart sounds. No murmur. No friction rub. No gallop.    Pulmonary:      Effort: Pulmonary effort is normal. No respiratory distress.      Breath sounds: Normal breath sounds.   Abdominal:      General: Bowel sounds are normal. There is no distension.      Palpations: Abdomen is soft.      Tenderness: There is no abdominal tenderness.   Skin:     General: Skin is warm and dry.   Neurological:      Mental Status: He is alert and oriented to person, place, and time.   Psychiatric:         Behavior: Behavior normal.         Thought Content: Thought content normal.         Judgment: Judgment normal.         Assessment/Plan     Problem List Items Addressed This Visit        Nervous and Auditory    Low back pain    Relevant Medications    methylPREDNISolone acetate (DEPO-medrol) injection 80 mg (Completed)    methylPREDNISolone acetate (DEPO-medrol) injection 40 mg (Completed)      Other Visit Diagnoses     Bilious vomiting with nausea    -  Primary    Relevant Medications    cholestyramine light (Prevalite) 4 g packet    Other Relevant Orders    CBC & Differential    Comprehensive Metabolic Panel    TSH    POC Urinalysis Dipstick    Ambulatory Referral to Gastroenterology    CBC Auto Differential    Weight loss        Relevant Medications    megestrol (Megace ES) 625 MG/5ML suspension    Other Relevant Orders    CBC & Differential    Comprehensive Metabolic " Panel    TSH    POC Urinalysis Dipstick    Ambulatory Referral to Gastroenterology    CBC Auto Differential        Plan: Get labs to look for cause of abdominal pain, weight loss. Refer to GI for abdominal pain, nausea and vomiting, weight loss. Megace ordered for loss of appetite. Cholestyramine ordered to absorb bile and prevent pain after cholecystectomy. Follow up pending results.    Patient's Body mass index is 22.1 kg/m². BMI is within normal parameters. No follow-up required..   (Normal BMI:  18.5-24.9, OW 25-29.9, Obesity 30 or greater)             Understands disease processes and need for medications.  Understands reasons for urgent and emergent care.  Patient (& family) verbalized agreement for treatment plan.   Emotional support and active listening provided.  Patient provided time to verbalize feelings.               This document has been electronically signed by ARNEL Amos   October 16, 2020 15:36 EDT

## 2020-10-17 LAB
ALBUMIN SERPL-MCNC: 3.1 G/DL (ref 3.5–5.2)
ALBUMIN/GLOB SERPL: 0.9 G/DL
ALP SERPL-CCNC: 392 U/L (ref 39–117)
ALT SERPL W P-5'-P-CCNC: 36 U/L (ref 1–41)
ANION GAP SERPL CALCULATED.3IONS-SCNC: 9.6 MMOL/L (ref 5–15)
AST SERPL-CCNC: 50 U/L (ref 1–40)
BASOPHILS # BLD AUTO: 0.03 10*3/MM3 (ref 0–0.2)
BASOPHILS NFR BLD AUTO: 0.6 % (ref 0–1.5)
BILIRUB SERPL-MCNC: 1.1 MG/DL (ref 0–1.2)
BUN SERPL-MCNC: 23 MG/DL (ref 8–23)
BUN/CREAT SERPL: 13.8 (ref 7–25)
CALCIUM SPEC-SCNC: 9.2 MG/DL (ref 8.6–10.5)
CHLORIDE SERPL-SCNC: 98 MMOL/L (ref 98–107)
CO2 SERPL-SCNC: 30.4 MMOL/L (ref 22–29)
CREAT SERPL-MCNC: 1.67 MG/DL (ref 0.76–1.27)
DEPRECATED RDW RBC AUTO: 44.4 FL (ref 37–54)
EOSINOPHIL # BLD AUTO: 0.01 10*3/MM3 (ref 0–0.4)
EOSINOPHIL NFR BLD AUTO: 0.2 % (ref 0.3–6.2)
ERYTHROCYTE [DISTWIDTH] IN BLOOD BY AUTOMATED COUNT: 13.3 % (ref 12.3–15.4)
GFR SERPL CREATININE-BSD FRML MDRD: 39 ML/MIN/1.73
GLOBULIN UR ELPH-MCNC: 3.6 GM/DL
GLUCOSE SERPL-MCNC: 87 MG/DL (ref 65–99)
HCT VFR BLD AUTO: 27.1 % (ref 37.5–51)
HGB BLD-MCNC: 9 G/DL (ref 13–17.7)
IMM GRANULOCYTES # BLD AUTO: 0.02 10*3/MM3 (ref 0–0.05)
IMM GRANULOCYTES NFR BLD AUTO: 0.4 % (ref 0–0.5)
LYMPHOCYTES # BLD AUTO: 1.17 10*3/MM3 (ref 0.7–3.1)
LYMPHOCYTES NFR BLD AUTO: 23.5 % (ref 19.6–45.3)
MCH RBC QN AUTO: 30.1 PG (ref 26.6–33)
MCHC RBC AUTO-ENTMCNC: 33.2 G/DL (ref 31.5–35.7)
MCV RBC AUTO: 90.6 FL (ref 79–97)
MONOCYTES # BLD AUTO: 0.71 10*3/MM3 (ref 0.1–0.9)
MONOCYTES NFR BLD AUTO: 14.3 % (ref 5–12)
NEUTROPHILS NFR BLD AUTO: 3.03 10*3/MM3 (ref 1.7–7)
NEUTROPHILS NFR BLD AUTO: 61 % (ref 42.7–76)
NRBC BLD AUTO-RTO: 0 /100 WBC (ref 0–0.2)
PLATELET # BLD AUTO: 211 10*3/MM3 (ref 140–450)
PMV BLD AUTO: 11.7 FL (ref 6–12)
POTASSIUM SERPL-SCNC: 4 MMOL/L (ref 3.5–5.2)
PROT SERPL-MCNC: 6.7 G/DL (ref 6–8.5)
RBC # BLD AUTO: 2.99 10*6/MM3 (ref 4.14–5.8)
SODIUM SERPL-SCNC: 138 MMOL/L (ref 136–145)
TSH SERPL DL<=0.05 MIU/L-ACNC: 1.47 UIU/ML (ref 0.27–4.2)
WBC # BLD AUTO: 4.97 10*3/MM3 (ref 3.4–10.8)

## 2020-10-19 NOTE — PROGRESS NOTES
His kidney function has been gradually decreasing over the past few months. I need a really good med list from him. We need to know everything he is taking including over the counter meds and supplements.

## 2020-10-21 DIAGNOSIS — M19.90 CHRONIC OSTEOARTHRITIS: ICD-10-CM

## 2020-10-22 RX ORDER — TRAMADOL HYDROCHLORIDE 50 MG/1
TABLET ORAL
Qty: 90 TABLET | Refills: 1 | Status: SHIPPED | OUTPATIENT
Start: 2020-10-22 | End: 2021-01-04 | Stop reason: SDUPTHER

## 2020-10-22 RX ORDER — TAMSULOSIN HYDROCHLORIDE 0.4 MG/1
CAPSULE ORAL
Qty: 90 CAPSULE | Refills: 3 | Status: SHIPPED | OUTPATIENT
Start: 2020-10-22 | End: 2021-09-15 | Stop reason: SDUPTHER

## 2020-10-23 ENCOUNTER — CONSULT (OUTPATIENT)
Dept: GASTROENTEROLOGY | Facility: CLINIC | Age: 85
End: 2020-10-23

## 2020-10-23 ENCOUNTER — HOSPITAL ENCOUNTER (OUTPATIENT)
Dept: ULTRASOUND IMAGING | Facility: HOSPITAL | Age: 85
Discharge: HOME | End: 2020-10-23
Admitting: PHYSICIAN ASSISTANT

## 2020-10-23 VITALS
HEIGHT: 70 IN | SYSTOLIC BLOOD PRESSURE: 120 MMHG | OXYGEN SATURATION: 92 % | TEMPERATURE: 98.6 F | BODY MASS INDEX: 21.76 KG/M2 | WEIGHT: 152 LBS | HEART RATE: 80 BPM | DIASTOLIC BLOOD PRESSURE: 59 MMHG

## 2020-10-23 DIAGNOSIS — R10.11 RUQ ABDOMINAL PAIN: ICD-10-CM

## 2020-10-23 DIAGNOSIS — R12 HEARTBURN: ICD-10-CM

## 2020-10-23 DIAGNOSIS — R94.4 DECREASED GFR: ICD-10-CM

## 2020-10-23 DIAGNOSIS — R11.2 NON-INTRACTABLE VOMITING WITH NAUSEA, UNSPECIFIED VOMITING TYPE: ICD-10-CM

## 2020-10-23 DIAGNOSIS — R74.8 ELEVATED ALKALINE PHOSPHATASE LEVEL: ICD-10-CM

## 2020-10-23 DIAGNOSIS — R11.2 NON-INTRACTABLE VOMITING WITH NAUSEA, UNSPECIFIED VOMITING TYPE: Primary | ICD-10-CM

## 2020-10-23 DIAGNOSIS — R79.9 ABNORMAL FINDING OF BLOOD CHEMISTRY, UNSPECIFIED: ICD-10-CM

## 2020-10-23 DIAGNOSIS — R63.4 WEIGHT LOSS, ABNORMAL: ICD-10-CM

## 2020-10-23 LAB
ALBUMIN SERPL-MCNC: 3.34 G/DL (ref 3.5–5.2)
ALBUMIN/GLOB SERPL: 0.9 G/DL
ALP SERPL-CCNC: 334 U/L (ref 39–117)
ALT SERPL W P-5'-P-CCNC: 23 U/L (ref 1–41)
ANION GAP SERPL CALCULATED.3IONS-SCNC: 11 MMOL/L (ref 5–15)
AST SERPL-CCNC: 32 U/L (ref 1–40)
BILIRUB SERPL-MCNC: 1 MG/DL (ref 0–1.2)
BUN SERPL-MCNC: 28 MG/DL (ref 8–23)
BUN/CREAT SERPL: 20 (ref 7–25)
CALCIUM SPEC-SCNC: 9.4 MG/DL (ref 8.6–10.5)
CHLORIDE SERPL-SCNC: 104 MMOL/L (ref 98–107)
CO2 SERPL-SCNC: 20 MMOL/L (ref 22–29)
CREAT SERPL-MCNC: 1.4 MG/DL (ref 0.76–1.27)
DEPRECATED RDW RBC AUTO: 48.7 FL (ref 37–54)
ERYTHROCYTE [DISTWIDTH] IN BLOOD BY AUTOMATED COUNT: 14.1 % (ref 12.3–15.4)
GFR SERPL CREATININE-BSD FRML MDRD: 48 ML/MIN/1.73
GLOBULIN UR ELPH-MCNC: 3.8 GM/DL
GLUCOSE SERPL-MCNC: 117 MG/DL (ref 65–99)
HCT VFR BLD AUTO: 29.7 % (ref 37.5–51)
HGB BLD-MCNC: 9.2 G/DL (ref 13–17.7)
IRON 24H UR-MRATE: 16 MCG/DL (ref 59–158)
MCH RBC QN AUTO: 29.3 PG (ref 26.6–33)
MCHC RBC AUTO-ENTMCNC: 31 G/DL (ref 31.5–35.7)
MCV RBC AUTO: 94.6 FL (ref 79–97)
PLATELET # BLD AUTO: 343 10*3/MM3 (ref 140–450)
PMV BLD AUTO: 10.6 FL (ref 6–12)
POTASSIUM SERPL-SCNC: 4.8 MMOL/L (ref 3.5–5.2)
PROT SERPL-MCNC: 7.1 G/DL (ref 6–8.5)
RBC # BLD AUTO: 3.14 10*6/MM3 (ref 4.14–5.8)
SODIUM SERPL-SCNC: 135 MMOL/L (ref 136–145)
WBC # BLD AUTO: 20.46 10*3/MM3 (ref 3.4–10.8)

## 2020-10-23 PROCEDURE — 85027 COMPLETE CBC AUTOMATED: CPT | Performed by: PHYSICIAN ASSISTANT

## 2020-10-23 PROCEDURE — 80053 COMPREHEN METABOLIC PANEL: CPT | Performed by: PHYSICIAN ASSISTANT

## 2020-10-23 PROCEDURE — 99204 OFFICE O/P NEW MOD 45 MIN: CPT | Performed by: PHYSICIAN ASSISTANT

## 2020-10-23 PROCEDURE — 76705 ECHO EXAM OF ABDOMEN: CPT

## 2020-10-23 PROCEDURE — 76705 ECHO EXAM OF ABDOMEN: CPT | Performed by: RADIOLOGY

## 2020-10-23 PROCEDURE — 83540 ASSAY OF IRON: CPT | Performed by: PHYSICIAN ASSISTANT

## 2020-10-23 RX ORDER — DEXLANSOPRAZOLE 60 MG/1
60 CAPSULE, DELAYED RELEASE ORAL DAILY
Qty: 30 CAPSULE | Refills: 5 | Status: SHIPPED | OUTPATIENT
Start: 2020-10-23 | End: 2021-01-04 | Stop reason: ALTCHOICE

## 2020-10-23 RX ORDER — FAMOTIDINE 40 MG/1
40 TABLET, FILM COATED ORAL 2 TIMES DAILY
Qty: 60 TABLET | Refills: 5 | Status: SHIPPED | OUTPATIENT
Start: 2020-10-23 | End: 2021-01-04 | Stop reason: ALTCHOICE

## 2020-10-23 RX ORDER — SUCRALFATE ORAL 1 G/10ML
1 SUSPENSION ORAL
Qty: 840 ML | Refills: 3 | Status: SHIPPED | OUTPATIENT
Start: 2020-10-23 | End: 2020-10-29

## 2020-10-23 NOTE — PROGRESS NOTES
: 1935    Chief Complaint   Patient presents with   • Weight Loss   • Vomiting       Rubén Rivas is a 85 y.o. male with PMH of prostate cancer (6 years ago) who presents to the office today as a consultation from ARNEL Velez for evaluation of Weight Loss and Vomiting.    History of Present Illness:  He complains of nausea, abdominal pain and vomiting which started several months ago. He had cholecystectomy in Aug 2020 by Dr. Krishnan.  He felt better for the first 3 days after surgery but by the fourth day all of his symptoms had returned.  He describes his discomfort as starting in the right upper quadrant and radiating into his mid chest.  He has a poor appetite and severe nausea daily.  Since he saw his PCP recently and started taking antinausea medication, he is feeling some better and has not had vomiting but still has nausea.  He is unable to eat normal meals.  Not currently taking any nutrition supplement.  His wife reports that he does have a history of a bleeding and perforated duodenal ulcer which required emergency treatment more than 10 years ago.  He is currently taking Protonix 40 mg once daily.  He takes Plavix as well due to history of heart disease and surgery.  His weight loss has been occurring since his symptoms started at the beginning of .    Review of Systems   HENT: Negative for sore throat and trouble swallowing.    Eyes: Negative.    Respiratory: Negative for chest tightness.    Cardiovascular: Negative for chest pain.   Gastrointestinal: Positive for abdominal pain and nausea. Negative for abdominal distention, anal bleeding, blood in stool, constipation, diarrhea and vomiting.   Endocrine: Negative.    Genitourinary: Negative for difficulty urinating.   Musculoskeletal: Positive for back pain and neck pain.   Skin: Positive for pallor.   Allergic/Immunologic: Negative for environmental allergies and food allergies.   Neurological: Positive for dizziness. Negative  for headaches.   Hematological: Bruises/bleeds easily.   Psychiatric/Behavioral: Negative for sleep disturbance.     I have reviewed and confirmed the accuracy of the ROS as documented by the MA/LPN/RN Liz Holliday PA-C    Past Medical History:   Diagnosis Date   • Anemia    • Arthritis    • Depression    • Hyperlipidemia    • Hypertension    • Prostate cancer (CMS/HCC)        Past Surgical History:   Procedure Laterality Date   • CARDIAC SURGERY     • COLONOSCOPY     • HERNIA REPAIR     • LAPAROSCOPIC CHOLECYSTECTOMY  2020    PERFORMED AT [facility]   • STOMACH SURGERY         Family History   Problem Relation Age of Onset   • Cancer Mother 35        breast   • Heart attack Father    • Cancer Sister         cervical   • Heart attack Brother    • Heart disease Brother    • Cancer Brother         Prostate       Social History     Socioeconomic History   • Marital status:      Spouse name: Not on file   • Number of children: Not on file   • Years of education: Not on file   • Highest education level: Not on file   Tobacco Use   • Smoking status: Former Smoker     Types: Cigarettes     Quit date: 1976     Years since quittin.2   • Smokeless tobacco: Never Used   Substance and Sexual Activity   • Alcohol use: No   • Drug use: No   • Sexual activity: Defer       Current Outpatient Medications:   •  amLODIPine (NORVASC) 5 MG tablet, Take 5 mg by mouth Daily. as directed, Disp: , Rfl:   •  aspirin 81 MG tablet, Take  by mouth daily., Disp: , Rfl:   •  clopidogrel (PLAVIX) 75 MG tablet, Take 1 tablet by mouth Daily., Disp: 90 tablet, Rfl: 3  •  diphenhydrAMINE-acetaminophen (TYLENOL PM)  MG tablet per tablet, Take 1 tablet by mouth At Night As Needed for sleep., Disp: , Rfl:   •  donepezil (ARICEPT) 5 MG tablet, TAKE 1 TABLET BY MOUTH ONCE DAILY AT NIGHT, Disp: 90 tablet, Rfl: 0  •  escitalopram (LEXAPRO) 20 MG tablet, Take 1 tablet by mouth Daily., Disp: 90 tablet, Rfl: 3  •  FIBER PO,  "Take  by mouth Daily., Disp: , Rfl:   •  Glucose Blood (ONETOUCH ULTRA BLUE VI), daily., Disp: , Rfl:   •  megestrol (Megace ES) 625 MG/5ML suspension, Take 5 mL by mouth Daily., Disp: 150 mL, Rfl: 0  •  metoprolol tartrate (LOPRESSOR) 25 MG tablet, Take 1 tablet by mouth 2 (Two) Times a Day., Disp: 180 tablet, Rfl: 1  •  Multiple Vitamins-Minerals (ONE-A-DAY MENS 50+ ADVANTAGE PO), Take  by mouth daily., Disp: , Rfl:   •  mupirocin (BACTROBAN) 2 % ointment, Apply  topically to the appropriate area as directed 3 (Three) Times a Day., Disp: 15 g, Rfl: 1  •  rosuvastatin (CRESTOR) 20 MG tablet, Take 1 tablet by mouth Daily., Disp: 90 tablet, Rfl: 3  •  tamsulosin (FLOMAX) 0.4 MG capsule 24 hr capsule, Take 1 capsule by mouth once daily, Disp: 90 capsule, Rfl: 3  •  traMADol (ULTRAM) 50 MG tablet, TAKE 1 TABLET BY MOUTH EVERY 8 TO 12 HOURS AS NEEDED FOR MODERATE PAIN, Disp: 90 tablet, Rfl: 1  •  cholestyramine light (Prevalite) 4 g packet, Take 1 packet by mouth 2 (Two) Times a Day., Disp: 60 each, Rfl: 0  •  dexlansoprazole (Dexilant) 60 MG capsule, Take 1 capsule by mouth Daily., Disp: 30 capsule, Rfl: 5  •  famotidine (PEPCID) 40 MG tablet, Take 1 tablet by mouth 2 (Two) Times a Day., Disp: 60 tablet, Rfl: 5  •  sucralfate (Carafate) 1 GM/10ML suspension, Take 10 mL by mouth 4 (Four) Times a Day With Meals & at Bedtime., Disp: 840 mL, Rfl: 3    Allergies:   Sulfa antibiotics    Vitals:  /59   Pulse 80   Temp 98.6 °F (37 °C)   Ht 177.8 cm (70\")   Wt 68.9 kg (152 lb)   SpO2 92%   BMI 21.81 kg/m²     Physical Exam  Vitals signs reviewed.   Constitutional:       General: He is not in acute distress.     Appearance: Normal appearance. He is well-developed. He is not ill-appearing or diaphoretic.   HENT:      Head: Normocephalic and atraumatic.      Right Ear: External ear normal.      Left Ear: External ear normal.      Ears:      Comments: Mild [place]     Nose: Nose normal.      Mouth/Throat:      Comments: " Wearing a mask  Eyes:      General: No scleral icterus.        Right eye: No discharge.         Left eye: No discharge.      Conjunctiva/sclera: Conjunctivae normal.   Neck:      Musculoskeletal: Normal range of motion.      Vascular: No JVD.   Cardiovascular:      Rate and Rhythm: Normal rate and regular rhythm.      Heart sounds: Normal heart sounds. No murmur. No friction rub. No gallop.    Pulmonary:      Effort: Pulmonary effort is normal. No respiratory distress.      Breath sounds: Normal breath sounds. No wheezing or rales.   Chest:      Chest wall: No tenderness.   Abdominal:      General: Bowel sounds are normal. There is no distension.      Palpations: Abdomen is soft. There is no mass.      Tenderness: There is no abdominal tenderness. There is no guarding.   Musculoskeletal: Normal range of motion.         General: No deformity.   Skin:     General: Skin is warm and dry.      Findings: No erythema or rash.      Comments: Tan skin   Neurological:      Mental Status: He is alert and oriented to person, place, and time.      Coordination: Coordination normal.   Psychiatric:         Mood and Affect: Mood normal.         Behavior: Behavior normal.         Thought Content: Thought content normal.         Judgment: Judgment normal.     Results Review:  Labs 10/16/2020 Hgb 9 0.0, hematocrit 27.1, MCV normal 90.6, platelets 211, white blood count 4.97, Alk phos 392, AST 50, ALT 36, total bilirubin 1.1, GFR 39, creatinine 1.67.    Assessment:  1. Non-intractable vomiting with nausea, unspecified vomiting type    2. Weight loss, abnormal    3. RUQ abdominal pain    4. Heartburn    5. Elevated alkaline phosphatase level    6. Decreased GFR    7. Abnormal finding of blood chemistry, unspecified       Plan:  Orders Placed This Encounter   Procedures   • US Liver   • Comprehensive Metabolic Panel   • CBC (No Diff)   • Iron   • Follow Anesthesia Guidelines / Standing Orders   • Obtain Informed Consent      ESOPHAGOGASTRODUODENOSCOPY WITH BIOPSY CPT CODE: 95170 (N/A)  He will need an esophagogastroduodenoscopy performed with IV general sedation. All of the risks, benefits and alternatives of this procedure have been discussed with him, all of his questions have been answered and he has elected to proceed. He should follow up in the office after this procedure to discuss the results and further recommendations can be made at that time.    New Medications Ordered This Visit   Medications   • dexlansoprazole (Dexilant) 60 MG capsule     Sig: Take 1 capsule by mouth Daily.     Dispense:  30 capsule     Refill:  5   • sucralfate (Carafate) 1 GM/10ML suspension     Sig: Take 10 mL by mouth 4 (Four) Times a Day With Meals & at Bedtime.     Dispense:  840 mL     Refill:  3   • famotidine (PEPCID) 40 MG tablet     Sig: Take 1 tablet by mouth 2 (Two) Times a Day.     Dispense:  60 tablet     Refill:  5     Will have STAT US liver today to check for intra or extrahepatic dilatation due to recent cholecystectomy in presence of abnormal AP and current symptoms. I feel that this is not likely given that his AST/ALT and Bili aren't more abnormal. It is concerning that his AP is severely elevated with his current symptoms, could have malignancy. Also discussed differential diagnosis of recurrent duodenal ulcer. Will start him on therapy for suspected ulcer today, Dexilant 60 mg once daily samples given, add Carafate as above and Pepcid 40 mg BID if insurance covers. Would double dose PPI but he has increased Cr and decreased GFR, also takes Plavix daily.     If EGD cannot be performed in timely manner with GI here, may consider having him discuss with  Dr. Krishnan but will check labs and imaging first.         Return in about 1 week (around 10/30/2020) for recheck abdominal pain.      Electronically signed 10/23/2020 16:40 TYRAT  Liz Holliday PA-C  [facility]

## 2020-10-29 ENCOUNTER — OFFICE VISIT (OUTPATIENT)
Dept: GASTROENTEROLOGY | Facility: CLINIC | Age: 85
End: 2020-10-29

## 2020-10-29 VITALS
OXYGEN SATURATION: 99 % | DIASTOLIC BLOOD PRESSURE: 62 MMHG | HEART RATE: 94 BPM | WEIGHT: 152 LBS | HEIGHT: 70 IN | TEMPERATURE: 98.4 F | BODY MASS INDEX: 21.76 KG/M2 | SYSTOLIC BLOOD PRESSURE: 97 MMHG

## 2020-10-29 DIAGNOSIS — D50.9 IRON DEFICIENCY ANEMIA, UNSPECIFIED IRON DEFICIENCY ANEMIA TYPE: ICD-10-CM

## 2020-10-29 DIAGNOSIS — R63.0 POOR APPETITE: ICD-10-CM

## 2020-10-29 DIAGNOSIS — R63.4 WEIGHT LOSS, ABNORMAL: ICD-10-CM

## 2020-10-29 DIAGNOSIS — R74.8 ELEVATED ALKALINE PHOSPHATASE LEVEL: ICD-10-CM

## 2020-10-29 DIAGNOSIS — Z87.19 HISTORY OF DUODENAL ULCER: ICD-10-CM

## 2020-10-29 DIAGNOSIS — R10.11 RUQ ABDOMINAL PAIN: Primary | ICD-10-CM

## 2020-10-29 PROCEDURE — 99214 OFFICE O/P EST MOD 30 MIN: CPT | Performed by: PHYSICIAN ASSISTANT

## 2020-10-29 RX ORDER — IRON,CARBONYL/ASCORBIC ACID 65MG-125MG
1 TABLET, DELAYED RELEASE (ENTERIC COATED) ORAL DAILY
Qty: 30 TABLET | Refills: 5 | Status: SHIPPED | OUTPATIENT
Start: 2020-10-29 | End: 2020-11-19 | Stop reason: SINTOL

## 2020-11-04 ENCOUNTER — TELEPHONE (OUTPATIENT)
Dept: GASTROENTEROLOGY | Facility: CLINIC | Age: 85
End: 2020-11-04

## 2020-11-04 DIAGNOSIS — R63.4 WEIGHT LOSS: ICD-10-CM

## 2020-11-04 NOTE — TELEPHONE ENCOUNTER
Patient's daughter called and said that the Vit. C Iron pill that you had prescribed to him, he has had a bad reaction to it and it has itched him to death. She wanted to know if there was something else that could be called in please?

## 2020-11-04 NOTE — TELEPHONE ENCOUNTER
For now we will hold off on iron supplementation, will discuss more at next visit. Discontinue vit c and iron given last visit.

## 2020-11-05 RX ORDER — MEGESTROL ACETATE 40 MG/ML
SUSPENSION ORAL
Qty: 300 ML | Refills: 0 | Status: SHIPPED | OUTPATIENT
Start: 2020-11-05 | End: 2020-11-10 | Stop reason: SDUPTHER

## 2020-11-10 DIAGNOSIS — R63.4 WEIGHT LOSS: ICD-10-CM

## 2020-11-10 RX ORDER — MEGESTROL ACETATE 40 MG/ML
400 SUSPENSION ORAL DAILY
Qty: 480 ML | Refills: 3 | Status: SHIPPED | OUTPATIENT
Start: 2020-11-10 | End: 2020-11-13 | Stop reason: SDUPTHER

## 2020-11-13 ENCOUNTER — TELEPHONE (OUTPATIENT)
Dept: FAMILY MEDICINE CLINIC | Facility: CLINIC | Age: 85
End: 2020-11-13

## 2020-11-13 DIAGNOSIS — R63.4 WEIGHT LOSS: ICD-10-CM

## 2020-11-13 RX ORDER — MEGESTROL ACETATE 40 MG/ML
400 SUSPENSION ORAL DAILY
Qty: 480 ML | Refills: 3 | OUTPATIENT
Start: 2020-11-13 | End: 2020-11-19

## 2020-11-13 RX ORDER — MEGESTROL ACETATE 40 MG/ML
SUSPENSION ORAL
Qty: 300 ML | Refills: 0 | Status: SHIPPED | OUTPATIENT
Start: 2020-11-13 | End: 2020-11-19

## 2020-11-13 NOTE — TELEPHONE ENCOUNTER
Megace was sent to [facility], pt requesting [facility].  Gave verbal order to Milana as patient was out of medication.  Removed Zehra from pts chart as they are no longer using that pharmacy.

## 2020-11-17 DIAGNOSIS — R63.4 WEIGHT LOSS: ICD-10-CM

## 2020-11-17 RX ORDER — MEGESTROL ACETATE 40 MG/ML
400 SUSPENSION ORAL DAILY
Qty: 480 ML | Refills: 3 | Status: CANCELLED | OUTPATIENT
Start: 2020-11-17

## 2020-11-19 ENCOUNTER — TELEPHONE (OUTPATIENT)
Dept: FAMILY MEDICINE CLINIC | Facility: CLINIC | Age: 85
End: 2020-11-19

## 2020-11-19 ENCOUNTER — OFFICE VISIT (OUTPATIENT)
Dept: GASTROENTEROLOGY | Facility: CLINIC | Age: 85
End: 2020-11-19

## 2020-11-19 VITALS — BODY MASS INDEX: 19.86 KG/M2 | WEIGHT: 140 LBS

## 2020-11-19 DIAGNOSIS — D50.9 IRON DEFICIENCY ANEMIA, UNSPECIFIED IRON DEFICIENCY ANEMIA TYPE: ICD-10-CM

## 2020-11-19 DIAGNOSIS — R63.4 WEIGHT LOSS: Primary | ICD-10-CM

## 2020-11-19 DIAGNOSIS — R10.11 RUQ ABDOMINAL PAIN: ICD-10-CM

## 2020-11-19 DIAGNOSIS — R63.4 WEIGHT LOSS, ABNORMAL: Primary | ICD-10-CM

## 2020-11-19 DIAGNOSIS — R63.0 POOR APPETITE: ICD-10-CM

## 2020-11-19 DIAGNOSIS — R19.7 DIARRHEA, UNSPECIFIED TYPE: ICD-10-CM

## 2020-11-19 PROCEDURE — 99442 PR PHYS/QHP TELEPHONE EVALUATION 11-20 MIN: CPT | Performed by: PHYSICIAN ASSISTANT

## 2020-11-19 RX ORDER — CYPROHEPTADINE HYDROCHLORIDE 4 MG/1
4 TABLET ORAL 3 TIMES DAILY
Qty: 45 TABLET | Refills: 2 | Status: SHIPPED | OUTPATIENT
Start: 2020-11-19 | End: 2020-11-19

## 2020-11-19 RX ORDER — DRONABINOL 2.5 MG/1
2.5 CAPSULE ORAL
Qty: 60 CAPSULE | Refills: 3 | Status: SHIPPED | OUTPATIENT
Start: 2020-11-19 | End: 2021-01-04

## 2020-11-19 NOTE — TELEPHONE ENCOUNTER
Periactin denied also. Spoke with Gela at [place] and the only medication covered for abnormal weight loss is dronabinol all strenghs.  Covered with no PA.

## 2020-11-19 NOTE — PROGRESS NOTES
: 1935    Chief Complaint   Patient presents with   • Abdominal Pain   • Diarrhea     You have chosen to receive care through a telephone visit. Do you consent to use a telephone visit for your medical care today? Yes    Rubén Rivas is a 85 y.o. male who presents to the office today as a follow up appointment regarding Abdominal Pain and Diarrhea.    History of Present Illness:  Had not been complaining of RUQ abdominal pain over the past few weeks, it seemed that he was eating more and had increased appetite. He had been using the walker to get himself from chair to dinner table daily. He had been drinking Boost shakes and taking Megace suspension. His insurance will no longer cover the megace and he has been prescribed a different medication now that he has not yet started. He had sudden onset diarrhea 3-4 days ago and it has just seemed to improve slightly today. Wife gives most of his history and reports that she has held his Dexilant and Pepcid since yesterday. She was afraid it could have contributed. He does not want to have an EGD at this time. He was not able to tolerate any iron supplement due to nausea so he is not currently on any iron replacement.    Previous history:  He complains of nausea, abdominal pain and vomiting which started several months ago. He had cholecystectomy in Aug 2020 by Dr. Krishnan.  He felt better for the first 3 days after surgery but by the fourth day all of his symptoms had returned.  He describes his discomfort as starting in the right upper quadrant and radiating into his mid chest.  He has a poor appetite and severe nausea daily.  Since he saw his PCP recently and started taking antinausea medication, he is feeling some better and has not had vomiting but still has nausea.  He is unable to eat normal meals.  Not currently taking any nutrition supplement.  His wife reports that he does have a history of a bleeding and perforated duodenal ulcer which required emergency  treatment more than 10 years ago.  He is currently taking Protonix 40 mg once daily.  He takes Plavix as well due to history of heart disease and surgery.  His weight loss has been occurring since his symptoms started at the beginning of 2020.    Review of Systems   Constitutional: Positive for activity change, appetite change and fatigue.   HENT: Negative for sore throat and trouble swallowing.    Eyes: Negative.    Respiratory: Negative for chest tightness.    Cardiovascular: Negative for chest pain.   Gastrointestinal: Positive for diarrhea. Negative for abdominal distention, abdominal pain, anal bleeding, blood in stool, constipation, nausea and vomiting.   Endocrine: Negative.    Genitourinary: Negative for difficulty urinating.   Musculoskeletal: Positive for back pain and neck pain.   Skin: Positive for pallor.   Allergic/Immunologic: Negative for environmental allergies and food allergies.   Neurological: Positive for dizziness and weakness. Negative for headaches.   Hematological: Bruises/bleeds easily.   Psychiatric/Behavioral: Negative for sleep disturbance.     I have reviewed and confirmed the accuracy of the ROS as documented by the MA/LPN/RN Liz Holliday PA-C    Past Medical History:   Diagnosis Date   • Anemia    • Arthritis    • Depression    • Hyperlipidemia    • Hypertension    • Prostate cancer (CMS/HCC) 2013       Past Surgical History:   Procedure Laterality Date   • CARDIAC SURGERY     • COLONOSCOPY     • HERNIA REPAIR     • LAPAROSCOPIC CHOLECYSTECTOMY  08/21/2020    PERFORMED AT [facility]   • STOMACH SURGERY         Family History   Problem Relation Age of Onset   • Cancer Mother 35        breast   • Heart attack Father    • Cancer Sister         cervical   • Heart attack Brother    • Heart disease Brother    • Cancer Brother         Prostate       Social History     Socioeconomic History   • Marital status:      Spouse name: Not on file   • Number of children: Not on file   •  Years of education: Not on file   • Highest education level: Not on file   Tobacco Use   • Smoking status: Former Smoker     Types: Cigarettes     Quit date: 1976     Years since quittin.3   • Smokeless tobacco: Never Used   Substance and Sexual Activity   • Alcohol use: No   • Drug use: No   • Sexual activity: Defer       Current Outpatient Medications:   •  amLODIPine (NORVASC) 5 MG tablet, Take 5 mg by mouth Daily. as directed, Disp: , Rfl:   •  aspirin 81 MG tablet, Take  by mouth daily., Disp: , Rfl:   •  clopidogrel (PLAVIX) 75 MG tablet, Take 1 tablet by mouth Daily., Disp: 90 tablet, Rfl: 3  •  dexlansoprazole (Dexilant) 60 MG capsule, Take 1 capsule by mouth Daily., Disp: 30 capsule, Rfl: 5  •  diphenhydrAMINE-acetaminophen (TYLENOL PM)  MG tablet per tablet, Take 1 tablet by mouth At Night As Needed for sleep., Disp: , Rfl:   •  donepezil (ARICEPT) 5 MG tablet, TAKE 1 TABLET BY MOUTH ONCE DAILY AT NIGHT, Disp: 90 tablet, Rfl: 0  •  FIBER PO, Take  by mouth Daily., Disp: , Rfl:   •  Glucose Blood (ONETOUCH ULTRA BLUE VI), daily., Disp: , Rfl:   •  metoprolol tartrate (LOPRESSOR) 25 MG tablet, Take 1 tablet by mouth 2 (Two) Times a Day., Disp: 180 tablet, Rfl: 1  •  Multiple Vitamins-Minerals (ONE-A-DAY MENS 50+ ADVANTAGE PO), Take  by mouth daily., Disp: , Rfl:   •  mupirocin (BACTROBAN) 2 % ointment, Apply  topically to the appropriate area as directed 3 (Three) Times a Day., Disp: 15 g, Rfl: 1  •  rosuvastatin (CRESTOR) 20 MG tablet, Take 1 tablet by mouth Daily., Disp: 90 tablet, Rfl: 3  •  tamsulosin (FLOMAX) 0.4 MG capsule 24 hr capsule, Take 1 capsule by mouth once daily, Disp: 90 capsule, Rfl: 3  •  traMADol (ULTRAM) 50 MG tablet, TAKE 1 TABLET BY MOUTH EVERY 8 TO 12 HOURS AS NEEDED FOR MODERATE PAIN, Disp: 90 tablet, Rfl: 1  •  cyproheptadine (PERIACTIN) 4 MG tablet, Take 1 tablet by mouth 3 (Three) Times a Day. Take before meal, Disp: 45 tablet, Rfl: 2  •  escitalopram (LEXAPRO) 20  MG tablet, Take 1 tablet by mouth Daily., Disp: 90 tablet, Rfl: 3  •  famotidine (PEPCID) 40 MG tablet, Take 1 tablet by mouth 2 (Two) Times a Day., Disp: 60 tablet, Rfl: 5  •  Iron-Vitamin C (Vitron-C)  MG tablet, Take 1 tablet by mouth Daily., Disp: 30 tablet, Rfl: 5    Allergies:   Sulfa antibiotics    Vitals:  Wt 63.5 kg (140 lb)   BMI 19.86 kg/m² - telephone visit    Physical Exam  HENT:      Head:      Comments: Voice normal  Pulmonary:      Effort: No respiratory distress.   Neurological:      Mental Status: He is alert and oriented to person, place, and time.   Psychiatric:         Thought Content: Thought content normal.         Judgment: Judgment normal.       Assessment:  1. Weight loss, abnormal    2. Poor appetite    3. Diarrhea, unspecified type    4. Iron deficiency anemia, unspecified iron deficiency anemia type    5. RUQ abdominal pain      Plan:  Orders Placed This Encounter   Procedures   • CT abdomen pelvis wo contrast     Will complete further imaging of abd/pelv tomorrow for further evaluation. They would like to defer EGD for now. Concerned for malignancy and/or duodenal ulcer contributing to symptoms as well as MELANIE, weight loss and elevated alkaline phosphatase.       This visit has been rescheduled as a phone visit to comply with patient safety concerns in accordance with CDC recommendations. Total time of discussion was 13 minutes.    Return in about 1 week (around 11/26/2020) for recheck diarrhea, discussion of results.      Electronically signed 11/19/2020 16:36 JOSEE Holliday PA-C  [facility]

## 2020-11-20 ENCOUNTER — HOSPITAL ENCOUNTER (OUTPATIENT)
Dept: CT IMAGING | Facility: HOSPITAL | Age: 85
Discharge: HOME | End: 2020-11-20
Admitting: PHYSICIAN ASSISTANT

## 2020-11-20 ENCOUNTER — TELEPHONE (OUTPATIENT)
Dept: GASTROENTEROLOGY | Facility: CLINIC | Age: 85
End: 2020-11-20

## 2020-11-20 DIAGNOSIS — R19.7 DIARRHEA, UNSPECIFIED TYPE: ICD-10-CM

## 2020-11-20 DIAGNOSIS — D50.9 IRON DEFICIENCY ANEMIA, UNSPECIFIED IRON DEFICIENCY ANEMIA TYPE: ICD-10-CM

## 2020-11-20 DIAGNOSIS — R10.11 RUQ ABDOMINAL PAIN: ICD-10-CM

## 2020-11-20 DIAGNOSIS — R63.4 WEIGHT LOSS, ABNORMAL: ICD-10-CM

## 2020-11-20 DIAGNOSIS — R63.0 POOR APPETITE: ICD-10-CM

## 2020-11-20 PROCEDURE — 74176 CT ABD & PELVIS W/O CONTRAST: CPT | Performed by: RADIOLOGY

## 2020-11-20 PROCEDURE — 74176 CT ABD & PELVIS W/O CONTRAST: CPT

## 2020-11-24 ENCOUNTER — TELEPHONE (OUTPATIENT)
Dept: FAMILY MEDICINE CLINIC | Facility: CLINIC | Age: 85
End: 2020-11-24

## 2020-11-24 NOTE — TELEPHONE ENCOUNTER
PATIENT SPOUSE CALLED AND STATES PATIENT IS VERY LETHARGIC, NOT EATING/DRINKING, AND SLEEPING A LOT. SHE WAS CALLING TO GET PATIENT AN APPOINTMENT WITH DR TAYLOR FOR Friday. SHE WAS ADVISED THAT THE OFFICE WOULD BE CLOSED. I DID ADVISE HER TO TAKE PATIENT TO THE ER DUE TO CURRENT STATE OF HEALTH. SHE VOICED UNDERSTANDING AND HER CONCERN ABOUT COVID   Admission

## 2020-12-22 DIAGNOSIS — M25.552 LEFT HIP PAIN: Primary | ICD-10-CM

## 2020-12-28 ENCOUNTER — APPOINTMENT (OUTPATIENT)
Dept: GENERAL RADIOLOGY | Facility: HOSPITAL | Age: 85
End: 2020-12-28

## 2020-12-28 DIAGNOSIS — G31.84 MILD COGNITIVE IMPAIRMENT WITH MEMORY LOSS: ICD-10-CM

## 2020-12-29 RX ORDER — DONEPEZIL HYDROCHLORIDE 5 MG/1
TABLET, FILM COATED ORAL
Qty: 90 TABLET | Refills: 0 | Status: SHIPPED | OUTPATIENT
Start: 2020-12-29 | End: 2021-01-08 | Stop reason: SDUPTHER

## 2021-01-04 ENCOUNTER — OFFICE VISIT (OUTPATIENT)
Dept: FAMILY MEDICINE CLINIC | Facility: CLINIC | Age: 86
End: 2021-01-04

## 2021-01-04 VITALS
WEIGHT: 140 LBS | RESPIRATION RATE: 18 BRPM | HEART RATE: 71 BPM | BODY MASS INDEX: 20.04 KG/M2 | TEMPERATURE: 97.1 F | DIASTOLIC BLOOD PRESSURE: 70 MMHG | HEIGHT: 70 IN | OXYGEN SATURATION: 98 % | SYSTOLIC BLOOD PRESSURE: 140 MMHG

## 2021-01-04 DIAGNOSIS — M19.90 CHRONIC OSTEOARTHRITIS: ICD-10-CM

## 2021-01-04 DIAGNOSIS — I10 ESSENTIAL HYPERTENSION: Chronic | ICD-10-CM

## 2021-01-04 DIAGNOSIS — N18.30 STAGE 3 CHRONIC KIDNEY DISEASE, UNSPECIFIED WHETHER STAGE 3A OR 3B CKD (HCC): Primary | ICD-10-CM

## 2021-01-04 DIAGNOSIS — K80.50 COMMON BILE DUCT CALCULI: ICD-10-CM

## 2021-01-04 DIAGNOSIS — D50.0 IRON DEFICIENCY ANEMIA DUE TO CHRONIC BLOOD LOSS: ICD-10-CM

## 2021-01-04 PROBLEM — G31.84 MILD COGNITIVE IMPAIRMENT WITH MEMORY LOSS: Chronic | Status: ACTIVE | Noted: 2020-02-03

## 2021-01-04 PROCEDURE — 80053 COMPREHEN METABOLIC PANEL: CPT | Performed by: FAMILY MEDICINE

## 2021-01-04 PROCEDURE — 36415 COLL VENOUS BLD VENIPUNCTURE: CPT | Performed by: FAMILY MEDICINE

## 2021-01-04 PROCEDURE — 85025 COMPLETE CBC W/AUTO DIFF WBC: CPT | Performed by: FAMILY MEDICINE

## 2021-01-04 PROCEDURE — 99214 OFFICE O/P EST MOD 30 MIN: CPT | Performed by: FAMILY MEDICINE

## 2021-01-04 RX ORDER — PANTOPRAZOLE SODIUM 40 MG/1
40 TABLET, DELAYED RELEASE ORAL DAILY
COMMUNITY
End: 2021-11-11

## 2021-01-04 RX ORDER — TRAMADOL HYDROCHLORIDE 50 MG/1
50 TABLET ORAL EVERY 8 HOURS PRN
Qty: 90 TABLET | Refills: 1 | Status: SHIPPED | OUTPATIENT
Start: 2021-01-04 | End: 2021-05-20

## 2021-01-04 NOTE — PROGRESS NOTES
Subjective   Rubén Rivas is a 85 y.o. male.     History of Present Illness follow-up for recent hospitalization [facility].  Was hospitalized acutely with acute renal failure choledocholithiasis anemia secondary to GI blood loss.  Significant interventions including dialysis for a while.  Discharged home eventually to home health.  Has had follow-up with GI.  Plans to have MRI.  Common duct stone.  Symptomatically back to normal and hopefully stone has passed.  Otherwise anticipates ERCP.  Presently energy is normalizing sleep patterns are better appetite improved pain gone.  Severely deconditioned.  No current GI or  symptoms.  All medications are currently as reconciled.  Family member present today.  Denies shortness of breath chest pain palpitations.  Was Covid positive although apparently remained asymptomatic during the event.    The following portions of the patient's history were reviewed and updated as appropriate: allergies, current medications, past family history, past social history, past surgical history and problem list.    Review of Systems  See history of Present Illness     Objective     Physical Exam  Vitals signs reviewed. Exam conducted with a chaperone present.   Constitutional:       Appearance: Normal appearance. He is well-developed.   HENT:      Head: Normocephalic.      Right Ear: External ear normal.      Left Ear: External ear normal.   Eyes:      Conjunctiva/sclera: Conjunctivae normal.      Pupils: Pupils are equal, round, and reactive to light.   Neck:      Musculoskeletal: Normal range of motion and neck supple.      Thyroid: No thyromegaly.      Trachea: No tracheal deviation.   Cardiovascular:      Rate and Rhythm: Normal rate and regular rhythm.      Heart sounds: Normal heart sounds. No murmur.   Pulmonary:      Effort: Pulmonary effort is normal.      Breath sounds: Normal breath sounds.   Abdominal:      General: Bowel sounds are normal.      Palpations: Abdomen is  soft.      Tenderness: There is no abdominal tenderness.   Musculoskeletal: Normal range of motion.      Right lower leg: No edema.      Left lower leg: No edema.   Lymphadenopathy:      Cervical: No cervical adenopathy.   Skin:     General: Skin is warm and dry.   Neurological:      Mental Status: He is alert and oriented to person, place, and time.   Psychiatric:         Mood and Affect: Mood normal.         PHQ-9 Total Score:      Patient's There is no height or weight on file to calculate BMI. BMI is within normal parameters. No follow-up required..   (Normal BMI:  18.5-24.9, OW 25-29.9, Obesity 30 or greater)      Assessment/Plan     Diagnoses and all orders for this visit:    1. Stage 3 chronic kidney disease, unspecified whether stage 3a or 3b CKD (Primary)  -     Comprehensive Metabolic Panel    2. Chronic osteoarthritis  -     traMADol (ULTRAM) 50 MG tablet; Take 1 tablet by mouth Every 8 (Eight) Hours As Needed for Moderate Pain .  Dispense: 90 tablet; Refill: 1    3. Iron deficiency anemia due to chronic blood loss  -     CBC & Differential  -     CBC Auto Differential    4. Essential hypertension  -     Comprehensive Metabolic Panel    5. Common bile duct calculi  -     Comprehensive Metabolic Panel    You appear to be getting back to normal after the significant acute illness.  We will continue medications as reconciled ordered.  Check labs.  Keep follow-up with GI.  Continue home health intervention.  Recheck in about 4 months or as needed.                     This document has been electronically signed by Chapin Sánchez MD   January 4, 2021 13:44 EST    Part of this note may be an electronic transcription/translation of spoken language to printed text using the Dragon Dictation System.

## 2021-01-05 LAB
ALBUMIN SERPL-MCNC: 3 G/DL (ref 3.5–5.2)
ALBUMIN/GLOB SERPL: 1 G/DL
ALP SERPL-CCNC: 518 U/L (ref 39–117)
ALT SERPL W P-5'-P-CCNC: 55 U/L (ref 1–41)
ANION GAP SERPL CALCULATED.3IONS-SCNC: 9.6 MMOL/L (ref 5–15)
AST SERPL-CCNC: 56 U/L (ref 1–40)
BASOPHILS # BLD AUTO: 0.08 10*3/MM3 (ref 0–0.2)
BASOPHILS NFR BLD AUTO: 1 % (ref 0–1.5)
BILIRUB SERPL-MCNC: 0.8 MG/DL (ref 0–1.2)
BUN SERPL-MCNC: 18 MG/DL (ref 8–23)
BUN/CREAT SERPL: 11 (ref 7–25)
CALCIUM SPEC-SCNC: 8.7 MG/DL (ref 8.6–10.5)
CHLORIDE SERPL-SCNC: 108 MMOL/L (ref 98–107)
CO2 SERPL-SCNC: 23.4 MMOL/L (ref 22–29)
CREAT SERPL-MCNC: 1.63 MG/DL (ref 0.76–1.27)
DEPRECATED RDW RBC AUTO: 52 FL (ref 37–54)
EOSINOPHIL # BLD AUTO: 0.31 10*3/MM3 (ref 0–0.4)
EOSINOPHIL NFR BLD AUTO: 4 % (ref 0.3–6.2)
ERYTHROCYTE [DISTWIDTH] IN BLOOD BY AUTOMATED COUNT: 16 % (ref 12.3–15.4)
GFR SERPL CREATININE-BSD FRML MDRD: 41 ML/MIN/1.73
GLOBULIN UR ELPH-MCNC: 2.9 GM/DL
GLUCOSE SERPL-MCNC: 102 MG/DL (ref 65–99)
HCT VFR BLD AUTO: 24.8 % (ref 37.5–51)
HGB BLD-MCNC: 8.1 G/DL (ref 13–17.7)
IMM GRANULOCYTES # BLD AUTO: 0.05 10*3/MM3 (ref 0–0.05)
IMM GRANULOCYTES NFR BLD AUTO: 0.6 % (ref 0–0.5)
LYMPHOCYTES # BLD AUTO: 2.2 10*3/MM3 (ref 0.7–3.1)
LYMPHOCYTES NFR BLD AUTO: 28.3 % (ref 19.6–45.3)
MCH RBC QN AUTO: 29.7 PG (ref 26.6–33)
MCHC RBC AUTO-ENTMCNC: 32.7 G/DL (ref 31.5–35.7)
MCV RBC AUTO: 90.8 FL (ref 79–97)
MONOCYTES # BLD AUTO: 0.68 10*3/MM3 (ref 0.1–0.9)
MONOCYTES NFR BLD AUTO: 8.7 % (ref 5–12)
NEUTROPHILS NFR BLD AUTO: 4.46 10*3/MM3 (ref 1.7–7)
NEUTROPHILS NFR BLD AUTO: 57.4 % (ref 42.7–76)
NRBC BLD AUTO-RTO: 0 /100 WBC (ref 0–0.2)
PLATELET # BLD AUTO: 251 10*3/MM3 (ref 140–450)
PMV BLD AUTO: 11.6 FL (ref 6–12)
POTASSIUM SERPL-SCNC: 5.1 MMOL/L (ref 3.5–5.2)
PROT SERPL-MCNC: 5.9 G/DL (ref 6–8.5)
RBC # BLD AUTO: 2.73 10*6/MM3 (ref 4.14–5.8)
SODIUM SERPL-SCNC: 141 MMOL/L (ref 136–145)
WBC # BLD AUTO: 7.78 10*3/MM3 (ref 3.4–10.8)

## 2021-01-05 NOTE — PROGRESS NOTES
Kidney function is still low but better than hospitalization I believe.  Still anemic but better than hospitalization I believe.  Liver enzymes still abnormal.  These results can all be made available for your GI visit if needed.  These lab results need to be monitored checked again in a couple of weeks.  Either here or through home health.  We need to arrange.

## 2021-01-07 ENCOUNTER — TELEPHONE (OUTPATIENT)
Dept: FAMILY MEDICINE CLINIC | Facility: CLINIC | Age: 86
End: 2021-01-07

## 2021-01-07 DIAGNOSIS — N18.30 STAGE 3 CHRONIC KIDNEY DISEASE, UNSPECIFIED WHETHER STAGE 3A OR 3B CKD (HCC): ICD-10-CM

## 2021-01-07 DIAGNOSIS — D50.0 IRON DEFICIENCY ANEMIA DUE TO CHRONIC BLOOD LOSS: Primary | ICD-10-CM

## 2021-01-07 NOTE — TELEPHONE ENCOUNTER
Arsenio from [facility] requested a call back. Patient informed Arsenio he is supposed to have lab work done for Dr. Sánchez in 2 weeks. Arsenio stated if we fax her the lab work orders she will get the labs for Dr. Sánchez so the patient does not have to come back to the office.    Please call and advise. Arsenio's call back [phone]  fax number [phone]

## 2021-01-08 ENCOUNTER — LAB REQUISITION (OUTPATIENT)
Dept: LAB | Facility: HOSPITAL | Age: 86
End: 2021-01-08

## 2021-01-08 DIAGNOSIS — I12.9 HYPERTENSIVE CHRONIC KIDNEY DISEASE WITH STAGE 1 THROUGH STAGE 4 CHRONIC KIDNEY DISEASE, OR UNSPECIFIED CHRONIC KIDNEY DISEASE: ICD-10-CM

## 2021-01-08 DIAGNOSIS — N18.30 CHRONIC KIDNEY DISEASE, STAGE 3 UNSPECIFIED (HCC): ICD-10-CM

## 2021-01-08 DIAGNOSIS — N17.0 ACUTE KIDNEY FAILURE WITH TUBULAR NECROSIS (HCC): ICD-10-CM

## 2021-01-08 DIAGNOSIS — G31.84 MILD COGNITIVE IMPAIRMENT WITH MEMORY LOSS: ICD-10-CM

## 2021-01-08 LAB
ALBUMIN SERPL-MCNC: 2.91 G/DL (ref 3.5–5.2)
ALBUMIN SERPL-MCNC: 2.91 G/DL (ref 3.5–5.2)
ALBUMIN/GLOB SERPL: 1 G/DL
ALP SERPL-CCNC: 548 U/L (ref 39–117)
ALT SERPL W P-5'-P-CCNC: 84 U/L (ref 1–41)
ANION GAP SERPL CALCULATED.3IONS-SCNC: 10.7 MMOL/L (ref 5–15)
ANION GAP SERPL CALCULATED.3IONS-SCNC: 10.7 MMOL/L (ref 5–15)
AST SERPL-CCNC: 148 U/L (ref 1–40)
BILIRUB SERPL-MCNC: 2.3 MG/DL (ref 0–1.2)
BUN SERPL-MCNC: 20 MG/DL (ref 8–23)
BUN SERPL-MCNC: 20 MG/DL (ref 8–23)
BUN/CREAT SERPL: 11.1 (ref 7–25)
BUN/CREAT SERPL: 11.1 (ref 7–25)
CALCIUM SPEC-SCNC: 8.4 MG/DL (ref 8.6–10.5)
CALCIUM SPEC-SCNC: 8.4 MG/DL (ref 8.6–10.5)
CHLORIDE SERPL-SCNC: 108 MMOL/L (ref 98–107)
CHLORIDE SERPL-SCNC: 108 MMOL/L (ref 98–107)
CO2 SERPL-SCNC: 21.3 MMOL/L (ref 22–29)
CO2 SERPL-SCNC: 21.3 MMOL/L (ref 22–29)
CREAT SERPL-MCNC: 1.8 MG/DL (ref 0.76–1.27)
CREAT SERPL-MCNC: 1.8 MG/DL (ref 0.76–1.27)
GFR SERPL CREATININE-BSD FRML MDRD: 36 ML/MIN/1.73
GFR SERPL CREATININE-BSD FRML MDRD: 36 ML/MIN/1.73
GLOBULIN UR ELPH-MCNC: 2.9 GM/DL
GLUCOSE SERPL-MCNC: 150 MG/DL (ref 65–99)
GLUCOSE SERPL-MCNC: 150 MG/DL (ref 65–99)
PHOSPHATE SERPL-MCNC: 3.6 MG/DL (ref 2.5–4.5)
POTASSIUM SERPL-SCNC: 5.2 MMOL/L (ref 3.5–5.2)
POTASSIUM SERPL-SCNC: 5.2 MMOL/L (ref 3.5–5.2)
PROT SERPL-MCNC: 5.8 G/DL (ref 6–8.5)
SODIUM SERPL-SCNC: 140 MMOL/L (ref 136–145)
SODIUM SERPL-SCNC: 140 MMOL/L (ref 136–145)

## 2021-01-08 PROCEDURE — 80069 RENAL FUNCTION PANEL: CPT | Performed by: INTERNAL MEDICINE

## 2021-01-08 PROCEDURE — 80053 COMPREHEN METABOLIC PANEL: CPT | Performed by: INTERNAL MEDICINE

## 2021-01-08 RX ORDER — DONEPEZIL HYDROCHLORIDE 5 MG/1
5 TABLET, FILM COATED ORAL NIGHTLY
Qty: 90 TABLET | Refills: 1 | Status: SHIPPED | OUTPATIENT
Start: 2021-01-08 | End: 2021-01-08 | Stop reason: SDUPTHER

## 2021-01-08 RX ORDER — DONEPEZIL HYDROCHLORIDE 5 MG/1
5 TABLET, FILM COATED ORAL NIGHTLY
Qty: 90 TABLET | Refills: 1 | Status: SHIPPED | OUTPATIENT
Start: 2021-01-08 | End: 2021-05-04 | Stop reason: SDUPTHER

## 2021-01-15 LAB — SARS-COV-2 RNA RESP QL NAA+PROBE: NOT DETECTED

## 2021-01-22 ENCOUNTER — LAB REQUISITION (OUTPATIENT)
Dept: LAB | Facility: HOSPITAL | Age: 86
End: 2021-01-22

## 2021-01-22 DIAGNOSIS — D50.0 IRON DEFICIENCY ANEMIA SECONDARY TO BLOOD LOSS (CHRONIC): ICD-10-CM

## 2021-01-22 DIAGNOSIS — N18.30 CHRONIC KIDNEY DISEASE, STAGE 3 UNSPECIFIED (HCC): ICD-10-CM

## 2021-01-22 LAB
ALBUMIN SERPL-MCNC: 2.82 G/DL (ref 3.5–5.2)
ALBUMIN/GLOB SERPL: 0.8 G/DL
ALP SERPL-CCNC: 482 U/L (ref 39–117)
ALT SERPL W P-5'-P-CCNC: 36 U/L (ref 1–41)
ANION GAP SERPL CALCULATED.3IONS-SCNC: 9.9 MMOL/L (ref 5–15)
AST SERPL-CCNC: 46 U/L (ref 1–40)
BASOPHILS # BLD AUTO: 0.07 10*3/MM3 (ref 0–0.2)
BASOPHILS NFR BLD AUTO: 0.9 % (ref 0–1.5)
BILIRUB SERPL-MCNC: 0.9 MG/DL (ref 0–1.2)
BUN SERPL-MCNC: 15 MG/DL (ref 8–23)
BUN/CREAT SERPL: 10.7 (ref 7–25)
CALCIUM SPEC-SCNC: 8.6 MG/DL (ref 8.6–10.5)
CHLORIDE SERPL-SCNC: 105 MMOL/L (ref 98–107)
CO2 SERPL-SCNC: 25.1 MMOL/L (ref 22–29)
CREAT SERPL-MCNC: 1.4 MG/DL (ref 0.76–1.27)
DEPRECATED RDW RBC AUTO: 61.1 FL (ref 37–54)
EOSINOPHIL # BLD AUTO: 0.2 10*3/MM3 (ref 0–0.4)
EOSINOPHIL NFR BLD AUTO: 2.6 % (ref 0.3–6.2)
ERYTHROCYTE [DISTWIDTH] IN BLOOD BY AUTOMATED COUNT: 16.6 % (ref 12.3–15.4)
GFR SERPL CREATININE-BSD FRML MDRD: 48 ML/MIN/1.73
GLOBULIN UR ELPH-MCNC: 3.4 GM/DL
GLUCOSE SERPL-MCNC: 95 MG/DL (ref 65–99)
HCT VFR BLD AUTO: 27.3 % (ref 37.5–51)
HGB BLD-MCNC: 8.5 G/DL (ref 13–17.7)
IMM GRANULOCYTES # BLD AUTO: 0.03 10*3/MM3 (ref 0–0.05)
IMM GRANULOCYTES NFR BLD AUTO: 0.4 % (ref 0–0.5)
LYMPHOCYTES # BLD AUTO: 2.01 10*3/MM3 (ref 0.7–3.1)
LYMPHOCYTES NFR BLD AUTO: 26 % (ref 19.6–45.3)
MCH RBC QN AUTO: 30.8 PG (ref 26.6–33)
MCHC RBC AUTO-ENTMCNC: 31.1 G/DL (ref 31.5–35.7)
MCV RBC AUTO: 98.9 FL (ref 79–97)
MONOCYTES # BLD AUTO: 0.68 10*3/MM3 (ref 0.1–0.9)
MONOCYTES NFR BLD AUTO: 8.8 % (ref 5–12)
NEUTROPHILS NFR BLD AUTO: 4.74 10*3/MM3 (ref 1.7–7)
NEUTROPHILS NFR BLD AUTO: 61.3 % (ref 42.7–76)
NRBC BLD AUTO-RTO: 0 /100 WBC (ref 0–0.2)
PLATELET # BLD AUTO: 290 10*3/MM3 (ref 140–450)
PMV BLD AUTO: 10.8 FL (ref 6–12)
POTASSIUM SERPL-SCNC: 4.2 MMOL/L (ref 3.5–5.2)
PROT SERPL-MCNC: 6.2 G/DL (ref 6–8.5)
RBC # BLD AUTO: 2.76 10*6/MM3 (ref 4.14–5.8)
SODIUM SERPL-SCNC: 140 MMOL/L (ref 136–145)
WBC # BLD AUTO: 7.73 10*3/MM3 (ref 3.4–10.8)

## 2021-01-22 PROCEDURE — 85025 COMPLETE CBC W/AUTO DIFF WBC: CPT | Performed by: FAMILY MEDICINE

## 2021-01-22 PROCEDURE — 80053 COMPREHEN METABOLIC PANEL: CPT | Performed by: FAMILY MEDICINE

## 2021-01-26 NOTE — PROGRESS NOTES
Lab testing appears to be somewhat improved from kidney standpoint.  Still anemic.  Keep follow-ups as recommended with liver specialist.  Will need to have anemia monitored.  Encouraged to ask to have liver specialist monitor at next visit in [place].  Keep us apprised of his visits.

## 2021-01-29 ENCOUNTER — TELEPHONE (OUTPATIENT)
Dept: FAMILY MEDICINE CLINIC | Facility: CLINIC | Age: 86
End: 2021-01-29

## 2021-01-29 DIAGNOSIS — E73.9 LACTOSE INTOLERANCE: Primary | ICD-10-CM

## 2021-01-29 RX ORDER — CHOLECALCIFEROL (VITAMIN D3) 125 MCG
3000 CAPSULE ORAL
Qty: 90 TABLET | Refills: 0 | Status: SHIPPED | OUTPATIENT
Start: 2021-01-29 | End: 2022-09-12

## 2021-01-29 NOTE — TELEPHONE ENCOUNTER
Speicalist wants him to drink Boost to build him up before surgery but he cannot tolerate it because of the lactose.  Can you send something to [facility] to help him tolerate?

## 2021-02-17 DIAGNOSIS — Z23 IMMUNIZATION DUE: ICD-10-CM

## 2021-02-22 ENCOUNTER — LAB (OUTPATIENT)
Dept: FAMILY MEDICINE CLINIC | Facility: CLINIC | Age: 86
End: 2021-02-22

## 2021-02-22 ENCOUNTER — TRANSCRIBE ORDERS (OUTPATIENT)
Dept: FAMILY MEDICINE CLINIC | Facility: CLINIC | Age: 86
End: 2021-02-22

## 2021-02-22 DIAGNOSIS — C61 PROSTATE CANCER (HCC): ICD-10-CM

## 2021-02-22 DIAGNOSIS — N18.30 STAGE 3 CHRONIC KIDNEY DISEASE, UNSPECIFIED WHETHER STAGE 3A OR 3B CKD (HCC): ICD-10-CM

## 2021-02-22 DIAGNOSIS — N18.30 STAGE 3 CHRONIC KIDNEY DISEASE, UNSPECIFIED WHETHER STAGE 3A OR 3B CKD (HCC): Primary | ICD-10-CM

## 2021-02-22 PROCEDURE — 36415 COLL VENOUS BLD VENIPUNCTURE: CPT | Performed by: FAMILY MEDICINE

## 2021-02-22 PROCEDURE — 80053 COMPREHEN METABOLIC PANEL: CPT

## 2021-02-22 PROCEDURE — 82043 UR ALBUMIN QUANTITATIVE: CPT

## 2021-02-22 PROCEDURE — 81003 URINALYSIS AUTO W/O SCOPE: CPT

## 2021-02-22 PROCEDURE — 82570 ASSAY OF URINE CREATININE: CPT

## 2021-02-22 PROCEDURE — 84153 ASSAY OF PSA TOTAL: CPT

## 2021-02-22 PROCEDURE — 85025 COMPLETE CBC W/AUTO DIFF WBC: CPT

## 2021-02-22 PROCEDURE — 84156 ASSAY OF PROTEIN URINE: CPT

## 2021-02-23 LAB
ALBUMIN SERPL-MCNC: 3.1 G/DL (ref 3.5–5.2)
ALBUMIN UR-MCNC: 5.1 MG/DL
ALBUMIN/GLOB SERPL: 1.1 G/DL
ALP SERPL-CCNC: 280 U/L (ref 39–117)
ALT SERPL W P-5'-P-CCNC: 18 U/L (ref 1–41)
ANION GAP SERPL CALCULATED.3IONS-SCNC: 8.3 MMOL/L (ref 5–15)
AST SERPL-CCNC: 32 U/L (ref 1–40)
BASOPHILS # BLD AUTO: 0.07 10*3/MM3 (ref 0–0.2)
BASOPHILS NFR BLD AUTO: 1.2 % (ref 0–1.5)
BILIRUB SERPL-MCNC: 0.6 MG/DL (ref 0–1.2)
BILIRUB UR QL STRIP: NEGATIVE
BILIRUB UR QL STRIP: NEGATIVE
BUN SERPL-MCNC: 16 MG/DL (ref 8–23)
BUN/CREAT SERPL: 12.5 (ref 7–25)
CALCIUM SPEC-SCNC: 8.6 MG/DL (ref 8.6–10.5)
CHLORIDE SERPL-SCNC: 105 MMOL/L (ref 98–107)
CLARITY UR: CLEAR
CLARITY UR: CLEAR
CO2 SERPL-SCNC: 24.7 MMOL/L (ref 22–29)
COLOR UR: ABNORMAL
COLOR UR: ABNORMAL
CREAT SERPL-MCNC: 1.28 MG/DL (ref 0.76–1.27)
CREAT UR-MCNC: 102.3 MG/DL
CREAT UR-MCNC: 102.3 MG/DL
DEPRECATED RDW RBC AUTO: 48.6 FL (ref 37–54)
EOSINOPHIL # BLD AUTO: 0.19 10*3/MM3 (ref 0–0.4)
EOSINOPHIL NFR BLD AUTO: 3.1 % (ref 0.3–6.2)
ERYTHROCYTE [DISTWIDTH] IN BLOOD BY AUTOMATED COUNT: 13.8 % (ref 12.3–15.4)
GFR SERPL CREATININE-BSD FRML MDRD: 54 ML/MIN/1.73
GLOBULIN UR ELPH-MCNC: 2.7 GM/DL
GLUCOSE SERPL-MCNC: 113 MG/DL (ref 65–99)
GLUCOSE UR STRIP-MCNC: NEGATIVE MG/DL
GLUCOSE UR STRIP-MCNC: NEGATIVE MG/DL
HCT VFR BLD AUTO: 28.4 % (ref 37.5–51)
HGB BLD-MCNC: 9.3 G/DL (ref 13–17.7)
HGB UR QL STRIP.AUTO: NEGATIVE
HGB UR QL STRIP.AUTO: NEGATIVE
IMM GRANULOCYTES # BLD AUTO: 0.02 10*3/MM3 (ref 0–0.05)
IMM GRANULOCYTES NFR BLD AUTO: 0.3 % (ref 0–0.5)
KETONES UR QL STRIP: NEGATIVE
KETONES UR QL STRIP: NEGATIVE
LEUKOCYTE ESTERASE UR QL STRIP.AUTO: NEGATIVE
LEUKOCYTE ESTERASE UR QL STRIP.AUTO: NEGATIVE
LYMPHOCYTES # BLD AUTO: 2.05 10*3/MM3 (ref 0.7–3.1)
LYMPHOCYTES NFR BLD AUTO: 33.7 % (ref 19.6–45.3)
MCH RBC QN AUTO: 31.1 PG (ref 26.6–33)
MCHC RBC AUTO-ENTMCNC: 32.7 G/DL (ref 31.5–35.7)
MCV RBC AUTO: 95 FL (ref 79–97)
MICROALBUMIN/CREAT UR: 49.9 MG/G
MONOCYTES # BLD AUTO: 0.51 10*3/MM3 (ref 0.1–0.9)
MONOCYTES NFR BLD AUTO: 8.4 % (ref 5–12)
NEUTROPHILS NFR BLD AUTO: 3.24 10*3/MM3 (ref 1.7–7)
NEUTROPHILS NFR BLD AUTO: 53.3 % (ref 42.7–76)
NITRITE UR QL STRIP: NEGATIVE
NITRITE UR QL STRIP: NEGATIVE
NRBC BLD AUTO-RTO: 0 /100 WBC (ref 0–0.2)
PH UR STRIP.AUTO: 6 [PH] (ref 5–8)
PH UR STRIP.AUTO: 6 [PH] (ref 5–8)
PLATELET # BLD AUTO: 243 10*3/MM3 (ref 140–450)
PMV BLD AUTO: 11.4 FL (ref 6–12)
POTASSIUM SERPL-SCNC: 4.8 MMOL/L (ref 3.5–5.2)
PROT SERPL-MCNC: 5.8 G/DL (ref 6–8.5)
PROT UR QL STRIP: ABNORMAL
PROT UR QL STRIP: ABNORMAL
PROT UR-MCNC: 27 MG/DL
PROT/CREAT UR: 263.9 MG/G CREA (ref 0–200)
PSA SERPL-MCNC: <0.014 NG/ML (ref 0–4)
RBC # BLD AUTO: 2.99 10*6/MM3 (ref 4.14–5.8)
SODIUM SERPL-SCNC: 138 MMOL/L (ref 136–145)
SP GR UR STRIP: 1.02 (ref 1–1.03)
SP GR UR STRIP: 1.02 (ref 1–1.03)
UROBILINOGEN UR QL STRIP: ABNORMAL
UROBILINOGEN UR QL STRIP: ABNORMAL
WBC # BLD AUTO: 6.08 10*3/MM3 (ref 3.4–10.8)

## 2021-03-10 ENCOUNTER — OFFICE VISIT (OUTPATIENT)
Dept: ORTHOPEDIC SURGERY | Facility: CLINIC | Age: 86
End: 2021-03-10

## 2021-03-10 ENCOUNTER — HOSPITAL ENCOUNTER (OUTPATIENT)
Dept: GENERAL RADIOLOGY | Facility: HOSPITAL | Age: 86
Discharge: HOME | End: 2021-03-10
Admitting: ORTHOPAEDIC SURGERY

## 2021-03-10 VITALS
TEMPERATURE: 98.4 F | HEIGHT: 70 IN | DIASTOLIC BLOOD PRESSURE: 66 MMHG | SYSTOLIC BLOOD PRESSURE: 125 MMHG | HEART RATE: 63 BPM | BODY MASS INDEX: 20.04 KG/M2 | WEIGHT: 140 LBS

## 2021-03-10 DIAGNOSIS — M54.50 LOW BACK PAIN, UNSPECIFIED BACK PAIN LATERALITY, UNSPECIFIED CHRONICITY, UNSPECIFIED WHETHER SCIATICA PRESENT: Primary | ICD-10-CM

## 2021-03-10 DIAGNOSIS — M51.36 DDD (DEGENERATIVE DISC DISEASE), LUMBAR: ICD-10-CM

## 2021-03-10 DIAGNOSIS — G89.29 CHRONIC HIP PAIN, BILATERAL: ICD-10-CM

## 2021-03-10 DIAGNOSIS — G89.29 CHRONIC LOW BACK PAIN, UNSPECIFIED BACK PAIN LATERALITY, UNSPECIFIED WHETHER SCIATICA PRESENT: Primary | ICD-10-CM

## 2021-03-10 DIAGNOSIS — M25.551 CHRONIC HIP PAIN, BILATERAL: ICD-10-CM

## 2021-03-10 DIAGNOSIS — M25.552 CHRONIC HIP PAIN, BILATERAL: ICD-10-CM

## 2021-03-10 DIAGNOSIS — M48.061 SPINAL STENOSIS OF LUMBAR REGION, UNSPECIFIED WHETHER NEUROGENIC CLAUDICATION PRESENT: ICD-10-CM

## 2021-03-10 DIAGNOSIS — M25.552 LEFT HIP PAIN: ICD-10-CM

## 2021-03-10 DIAGNOSIS — M54.50 CHRONIC LOW BACK PAIN, UNSPECIFIED BACK PAIN LATERALITY, UNSPECIFIED WHETHER SCIATICA PRESENT: Primary | ICD-10-CM

## 2021-03-10 PROCEDURE — 72100 X-RAY EXAM L-S SPINE 2/3 VWS: CPT

## 2021-03-10 PROCEDURE — 72100 X-RAY EXAM L-S SPINE 2/3 VWS: CPT | Performed by: RADIOLOGY

## 2021-03-10 PROCEDURE — 99203 OFFICE O/P NEW LOW 30 MIN: CPT | Performed by: ORTHOPAEDIC SURGERY

## 2021-03-10 PROCEDURE — 73522 X-RAY EXAM HIPS BI 3-4 VIEWS: CPT

## 2021-03-10 PROCEDURE — 73522 X-RAY EXAM HIPS BI 3-4 VIEWS: CPT | Performed by: RADIOLOGY

## 2021-03-10 NOTE — PROGRESS NOTES
New Patient Visit      Patient: Rubén Rivas  YOB: 1935  Date of Encounter: 03/10/2021        Chief Complaint:   Chief Complaint   Patient presents with   • Left Hip - Initial Evaluation, Pain   • Right Hip - Pain, Initial Evaluation           HPI:   Rubén Rivas, 85 y.o. male, referred by Chapin Sánchez MD presents with complaints of low back pain approximately 10 years duration with worsening over the past few years. He describes pain posteriorly right hip greater than left with relief when he usually sits down.  Continues to stand and walk, he describes with this weakness in his legs.  He has received several IM steroid injections and reports good response with this.  He reports no significant trauma recent or remote.  He denies bowel or bladder problems.  Reports pain in his lower back daily and usually accompanies being upright.  His past medical history is remarkable for rheumatoid arthritis, chronic stage III kidney disease and history of prostate cancer.  He receives tramadol for control of his pain and takes approximately 2/day.        Active Problem List:  Patient Active Problem List   Diagnosis   • Anemia   • Chronic coronary artery disease   • Chronic osteoarthritis   • Depression   • Elevated prostate specific antigen (PSA)   • Enlarged prostate   • Hyperlipidemia   • Essential hypertension   • Iron deficiency   • Low back pain   • Malignant neoplasm of prostate (CMS/HCC)   • Rheumatoid arthritis (CMS/HCC)   • Prostate cancer (CMS/HCC)   • Mild cognitive impairment with memory loss   • Stage 3 chronic kidney disease (CMS/HCC)   • Spinal stenosis of lumbar region   • DDD (degenerative disc disease), lumbar           Past Medical History:  Past Medical History:   Diagnosis Date   • Anemia    • Arthritis    • Depression    • Hyperlipidemia    • Hypertension    • Prostate cancer (CMS/HCC) 2013   • Stage 3 chronic kidney disease (CMS/HCC) 1/4/2021           Past Surgical History:  Past  Surgical History:   Procedure Laterality Date   • CARDIAC SURGERY     • COLONOSCOPY     • HERNIA REPAIR     • LAPAROSCOPIC CHOLECYSTECTOMY  2020    PERFORMED AT [facility]   • STOMACH SURGERY             Family History:  Family History   Problem Relation Age of Onset   • Cancer Mother 35        breast   • Heart attack Father    • Cancer Sister         cervical   • Heart attack Brother    • Heart disease Brother    • Cancer Brother         Prostate           Social History:  Social History     Socioeconomic History   • Marital status:      Spouse name: Not on file   • Number of children: Not on file   • Years of education: Not on file   • Highest education level: Not on file   Tobacco Use   • Smoking status: Former Smoker     Types: Cigarettes     Quit date: 1976     Years since quittin.6   • Smokeless tobacco: Never Used   Vaping Use   • Vaping Use: Never used   Substance and Sexual Activity   • Alcohol use: No   • Drug use: No   • Sexual activity: Defer     Body mass index is 20.09 kg/m².      Medications:  Current Outpatient Medications   Medication Sig Dispense Refill   • amLODIPine (NORVASC) 5 MG tablet Take 5 mg by mouth Daily. as directed     • aspirin 81 MG tablet Take  by mouth daily.     • clopidogrel (PLAVIX) 75 MG tablet Take 1 tablet by mouth Daily. 90 tablet 3   • diphenhydrAMINE-acetaminophen (TYLENOL PM)  MG tablet per tablet Take 1 tablet by mouth At Night As Needed for sleep.     • donepezil (ARICEPT) 5 MG tablet Take 1 tablet by mouth Every Night. 90 tablet 1   • escitalopram (LEXAPRO) 20 MG tablet Take 1 tablet by mouth Daily. 90 tablet 3   • FIBER PO Take  by mouth Daily.     • Glucose Blood (ONETOUCH ULTRA BLUE VI) daily.     • lactase (Lactaid) 3000 units tablet Take 1 tablet by mouth 3 (Three) Times a Day With Meals. 90 tablet 0   • metoprolol tartrate (LOPRESSOR) 25 MG tablet Take 1 tablet by mouth 2 (Two) Times a Day. 180 tablet 1   • Multiple  "Vitamins-Minerals (ONE-A-DAY MENS 50+ ADVANTAGE PO) Take  by mouth daily.     • mupirocin (BACTROBAN) 2 % ointment Apply  topically to the appropriate area as directed 3 (Three) Times a Day. 15 g 1   • pantoprazole (PROTONIX) 40 MG EC tablet Take 40 mg by mouth Daily.     • rosuvastatin (CRESTOR) 20 MG tablet Take 1 tablet by mouth Daily. 90 tablet 3   • tamsulosin (FLOMAX) 0.4 MG capsule 24 hr capsule Take 1 capsule by mouth once daily 90 capsule 3   • traMADol (ULTRAM) 50 MG tablet Take 1 tablet by mouth Every 8 (Eight) Hours As Needed for Moderate Pain . 90 tablet 1     No current facility-administered medications for this visit.           Allergies:  Allergies   Allergen Reactions   • Sulfa Antibiotics Rash           Review of Systems:   Review of Systems   Constitutional: Positive for appetite change.   HENT: Positive for postnasal drip.    Eyes: Negative.    Respiratory: Negative.    Cardiovascular: Negative.    Gastrointestinal: Positive for diarrhea.   Endocrine: Negative.    Genitourinary: Negative.    Musculoskeletal: Positive for arthralgias and back pain.   Skin: Negative.    Allergic/Immunologic: Negative.    Neurological: Negative.    Hematological: Bruises/bleeds easily.   Psychiatric/Behavioral: Negative.        Physical Exam:   Physical Exam  GENERAL: 85 y.o. male, alert and oriented X 3 in no acute distress.   Visit Vitals  /66   Pulse 63   Temp 98.4 °F (36.9 °C)   Ht 177.8 cm (70\")   Wt 63.5 kg (140 lb)   BMI 20.09 kg/m²         Musculoskeletal:   Examination so lumbar spine is straight with mild tenderness posteriorly paraspinal region.  Hip mobility is limited but symmetrical right and left with approximately 40 degree arc of internal and external rotation he does not have significant pain at the extremes of rotation.  CASS test is negative bilaterally.  He experiences moderate pain with straight leg raising at about 40 degrees bilaterally.  Reports diminished sensation to both lower " extremities with light touch.        Radiology/Labs:     XR Spine Lumbar AP & Lateral    Result Date: 3/10/2021  Moderate degenerative disc changes in the lumbar disc spaces.  This report was finalized on 3/10/2021 9:21 AM by Dr. Carlos Ludwig II, MD.      XR Hips Bilateral With or Without Pelvis 3-4 View    Result Date: 3/10/2021  Mild osteoarthritis in the hips.  This report was finalized on 3/10/2021 9:05 AM by Dr. Carlos Ludwig II, MD.    Report of bilateral hip x-rays and pelvic x-rays as above with mild degenerative arthritis my review shows no significant osteoarthritis of either hip.  SI joints with minimal degenerative changes.    Lumbar spine films obtained today by report describe degenerative disc changes greatest at L4-5 and L5-S1 my review confirms with fairly advanced facet joint arthritis.      Assessment & Plan:   85 y.o. male presents with history and clinical examination consistent with lumbar spinal stenosis.  We discussed the possibility of obtaining MRI of lumbar spine but as he is adamant that he does not wish to consider surgical solution to his problem.  We did discuss the possibility of referral to pain management.  He wishes to think about this.  Will discuss with Dr. Sánchez.  I think he would be a reasonable candidate for referral to Dr. Aldana.  Daughter questioned the IM steroid injections and I think this is also a reasonable option as he has received significant relief.  He will follow-up in this office as needed.    ICD-10-CM ICD-9-CM   1. Chronic low back pain, unspecified back pain laterality, unspecified whether sciatica present  M54.5 724.2    G89.29 338.29   2. DDD (degenerative disc disease), lumbar  M51.36 722.52   3. Spinal stenosis of lumbar region, unspecified whether neurogenic claudication present  M48.061 724.02           Cc:   Chapin Sánchez MD                This document has been electronically signed by Jesús Pineda MD   March 11, 2021 10:41  EST

## 2021-03-25 ENCOUNTER — TELEPHONE (OUTPATIENT)
Dept: FAMILY MEDICINE CLINIC | Facility: CLINIC | Age: 86
End: 2021-03-25

## 2021-03-25 DIAGNOSIS — I10 ESSENTIAL HYPERTENSION: Primary | ICD-10-CM

## 2021-03-25 NOTE — TELEPHONE ENCOUNTER
NEEDS REFILL ON METOPROLOL 25 MG SENT TO [facility]. CHANGED PHARMACIES [facility] COULDN'T SEND BECAUSE THEY HAVE NEVER FILLED BEFORE [phone]

## 2021-04-14 ENCOUNTER — TRANSCRIBE ORDERS (OUTPATIENT)
Dept: FAMILY MEDICINE CLINIC | Facility: CLINIC | Age: 86
End: 2021-04-14

## 2021-04-14 ENCOUNTER — LAB (OUTPATIENT)
Dept: FAMILY MEDICINE CLINIC | Facility: CLINIC | Age: 86
End: 2021-04-14

## 2021-04-14 DIAGNOSIS — R74.8 ALKALINE PHOSPHATASE ELEVATION: Primary | ICD-10-CM

## 2021-04-14 DIAGNOSIS — N18.9 ANEMIA OF CHRONIC RENAL FAILURE, UNSPECIFIED CKD STAGE: ICD-10-CM

## 2021-04-14 DIAGNOSIS — D63.1 ANEMIA OF CHRONIC RENAL FAILURE, UNSPECIFIED CKD STAGE: ICD-10-CM

## 2021-04-14 DIAGNOSIS — N18.30 STAGE 3 CHRONIC KIDNEY DISEASE, UNSPECIFIED WHETHER STAGE 3A OR 3B CKD (HCC): ICD-10-CM

## 2021-04-14 DIAGNOSIS — R74.8 ALKALINE PHOSPHATASE ELEVATION: ICD-10-CM

## 2021-04-14 PROCEDURE — 83540 ASSAY OF IRON: CPT | Performed by: INTERNAL MEDICINE

## 2021-04-14 PROCEDURE — 85025 COMPLETE CBC W/AUTO DIFF WBC: CPT | Performed by: INTERNAL MEDICINE

## 2021-04-14 PROCEDURE — 36415 COLL VENOUS BLD VENIPUNCTURE: CPT | Performed by: NURSE PRACTITIONER

## 2021-04-14 PROCEDURE — 82977 ASSAY OF GGT: CPT | Performed by: INTERNAL MEDICINE

## 2021-04-14 PROCEDURE — 80053 COMPREHEN METABOLIC PANEL: CPT | Performed by: INTERNAL MEDICINE

## 2021-04-14 PROCEDURE — 84466 ASSAY OF TRANSFERRIN: CPT | Performed by: INTERNAL MEDICINE

## 2021-04-14 PROCEDURE — 82728 ASSAY OF FERRITIN: CPT | Performed by: INTERNAL MEDICINE

## 2021-04-14 PROCEDURE — 82043 UR ALBUMIN QUANTITATIVE: CPT | Performed by: INTERNAL MEDICINE

## 2021-04-14 PROCEDURE — 84156 ASSAY OF PROTEIN URINE: CPT | Performed by: INTERNAL MEDICINE

## 2021-04-14 PROCEDURE — 82570 ASSAY OF URINE CREATININE: CPT | Performed by: INTERNAL MEDICINE

## 2021-04-14 PROCEDURE — 81003 URINALYSIS AUTO W/O SCOPE: CPT | Performed by: INTERNAL MEDICINE

## 2021-04-15 LAB
ALBUMIN SERPL-MCNC: 3.6 G/DL (ref 3.5–5.2)
ALBUMIN UR-MCNC: 3.4 MG/DL
ALBUMIN/GLOB SERPL: 1.4 G/DL
ALP SERPL-CCNC: 135 U/L (ref 39–117)
ALT SERPL W P-5'-P-CCNC: 23 U/L (ref 1–41)
ANION GAP SERPL CALCULATED.3IONS-SCNC: 7.5 MMOL/L (ref 5–15)
AST SERPL-CCNC: 34 U/L (ref 1–40)
BASOPHILS # BLD AUTO: 0.06 10*3/MM3 (ref 0–0.2)
BASOPHILS NFR BLD AUTO: 1 % (ref 0–1.5)
BILIRUB SERPL-MCNC: 0.3 MG/DL (ref 0–1.2)
BILIRUB UR QL STRIP: NEGATIVE
BUN SERPL-MCNC: 18 MG/DL (ref 8–23)
BUN/CREAT SERPL: 13.7 (ref 7–25)
CALCIUM SPEC-SCNC: 9.4 MG/DL (ref 8.6–10.5)
CHLORIDE SERPL-SCNC: 104 MMOL/L (ref 98–107)
CLARITY UR: CLEAR
CO2 SERPL-SCNC: 26.5 MMOL/L (ref 22–29)
COLOR UR: YELLOW
CREAT SERPL-MCNC: 1.31 MG/DL (ref 0.76–1.27)
CREAT UR-MCNC: 85.7 MG/DL
CREAT UR-MCNC: 85.7 MG/DL
DEPRECATED RDW RBC AUTO: 46.4 FL (ref 37–54)
EOSINOPHIL # BLD AUTO: 0.27 10*3/MM3 (ref 0–0.4)
EOSINOPHIL NFR BLD AUTO: 4.7 % (ref 0.3–6.2)
ERYTHROCYTE [DISTWIDTH] IN BLOOD BY AUTOMATED COUNT: 13.1 % (ref 12.3–15.4)
FERRITIN SERPL-MCNC: 574 NG/ML (ref 30–400)
GFR SERPL CREATININE-BSD FRML MDRD: 52 ML/MIN/1.73
GGT SERPL-CCNC: 158 U/L (ref 8–61)
GLOBULIN UR ELPH-MCNC: 2.6 GM/DL
GLUCOSE SERPL-MCNC: 87 MG/DL (ref 65–99)
GLUCOSE UR STRIP-MCNC: NEGATIVE MG/DL
HCT VFR BLD AUTO: 32.7 % (ref 37.5–51)
HGB BLD-MCNC: 11 G/DL (ref 13–17.7)
HGB UR QL STRIP.AUTO: NEGATIVE
IMM GRANULOCYTES # BLD AUTO: 0.01 10*3/MM3 (ref 0–0.05)
IMM GRANULOCYTES NFR BLD AUTO: 0.2 % (ref 0–0.5)
IRON 24H UR-MRATE: 77 MCG/DL (ref 59–158)
IRON SATN MFR SERPL: 30 % (ref 20–50)
KETONES UR QL STRIP: NEGATIVE
LEUKOCYTE ESTERASE UR QL STRIP.AUTO: NEGATIVE
LYMPHOCYTES # BLD AUTO: 1.92 10*3/MM3 (ref 0.7–3.1)
LYMPHOCYTES NFR BLD AUTO: 33.2 % (ref 19.6–45.3)
MCH RBC QN AUTO: 32.4 PG (ref 26.6–33)
MCHC RBC AUTO-ENTMCNC: 33.6 G/DL (ref 31.5–35.7)
MCV RBC AUTO: 96.2 FL (ref 79–97)
MICROALBUMIN/CREAT UR: 39.7 MG/G
MONOCYTES # BLD AUTO: 0.6 10*3/MM3 (ref 0.1–0.9)
MONOCYTES NFR BLD AUTO: 10.4 % (ref 5–12)
NEUTROPHILS NFR BLD AUTO: 2.93 10*3/MM3 (ref 1.7–7)
NEUTROPHILS NFR BLD AUTO: 50.5 % (ref 42.7–76)
NITRITE UR QL STRIP: NEGATIVE
NRBC BLD AUTO-RTO: 0 /100 WBC (ref 0–0.2)
PH UR STRIP.AUTO: 6.5 [PH] (ref 5–8)
PLATELET # BLD AUTO: 229 10*3/MM3 (ref 140–450)
PMV BLD AUTO: 11 FL (ref 6–12)
POTASSIUM SERPL-SCNC: 5.2 MMOL/L (ref 3.5–5.2)
PROT SERPL-MCNC: 6.2 G/DL (ref 6–8.5)
PROT UR QL STRIP: NEGATIVE
PROT UR-MCNC: 11 MG/DL
PROT/CREAT UR: 128.4 MG/G CREA (ref 0–200)
RBC # BLD AUTO: 3.4 10*6/MM3 (ref 4.14–5.8)
SODIUM SERPL-SCNC: 138 MMOL/L (ref 136–145)
SP GR UR STRIP: 1.01 (ref 1–1.03)
TIBC SERPL-MCNC: 256 MCG/DL (ref 298–536)
TRANSFERRIN SERPL-MCNC: 172 MG/DL (ref 200–360)
UROBILINOGEN UR QL STRIP: NORMAL
WBC # BLD AUTO: 5.79 10*3/MM3 (ref 3.4–10.8)

## 2021-05-04 ENCOUNTER — OFFICE VISIT (OUTPATIENT)
Dept: FAMILY MEDICINE CLINIC | Facility: CLINIC | Age: 86
End: 2021-05-04

## 2021-05-04 VITALS
WEIGHT: 141.8 LBS | HEIGHT: 70 IN | BODY MASS INDEX: 20.3 KG/M2 | SYSTOLIC BLOOD PRESSURE: 124 MMHG | OXYGEN SATURATION: 100 % | HEART RATE: 58 BPM | TEMPERATURE: 97.5 F | DIASTOLIC BLOOD PRESSURE: 55 MMHG

## 2021-05-04 DIAGNOSIS — I10 ESSENTIAL HYPERTENSION: Primary | Chronic | ICD-10-CM

## 2021-05-04 DIAGNOSIS — R19.7 DIARRHEA, UNSPECIFIED TYPE: ICD-10-CM

## 2021-05-04 DIAGNOSIS — G89.29 CHRONIC LOW BACK PAIN, UNSPECIFIED BACK PAIN LATERALITY, UNSPECIFIED WHETHER SCIATICA PRESENT: ICD-10-CM

## 2021-05-04 DIAGNOSIS — M54.50 CHRONIC LOW BACK PAIN, UNSPECIFIED BACK PAIN LATERALITY, UNSPECIFIED WHETHER SCIATICA PRESENT: ICD-10-CM

## 2021-05-04 DIAGNOSIS — Z00.00 MEDICARE ANNUAL WELLNESS VISIT, SUBSEQUENT: ICD-10-CM

## 2021-05-04 DIAGNOSIS — G31.84 MILD COGNITIVE IMPAIRMENT WITH MEMORY LOSS: Chronic | ICD-10-CM

## 2021-05-04 PROBLEM — N18.30 STAGE 3 CHRONIC KIDNEY DISEASE: Chronic | Status: ACTIVE | Noted: 2021-01-04

## 2021-05-04 PROCEDURE — 99214 OFFICE O/P EST MOD 30 MIN: CPT | Performed by: FAMILY MEDICINE

## 2021-05-04 PROCEDURE — G0439 PPPS, SUBSEQ VISIT: HCPCS | Performed by: FAMILY MEDICINE

## 2021-05-04 PROCEDURE — 96372 THER/PROPH/DIAG INJ SC/IM: CPT | Performed by: FAMILY MEDICINE

## 2021-05-04 RX ORDER — METHYLPREDNISOLONE ACETATE 40 MG/ML
40 INJECTION, SUSPENSION INTRA-ARTICULAR; INTRALESIONAL; INTRAMUSCULAR; SOFT TISSUE ONCE
Status: COMPLETED | OUTPATIENT
Start: 2021-05-04 | End: 2021-05-04

## 2021-05-04 RX ORDER — DONEPEZIL HYDROCHLORIDE 10 MG/1
10 TABLET, FILM COATED ORAL NIGHTLY
Qty: 90 TABLET | Refills: 1 | Status: SHIPPED | OUTPATIENT
Start: 2021-05-04 | End: 2021-12-02

## 2021-05-04 RX ORDER — DIPHENOXYLATE HYDROCHLORIDE AND ATROPINE SULFATE 2.5; .025 MG/1; MG/1
1 TABLET ORAL 4 TIMES DAILY PRN
Qty: 30 TABLET | Refills: 0 | Status: SHIPPED | OUTPATIENT
Start: 2021-05-04 | End: 2021-05-23

## 2021-05-04 RX ADMIN — METHYLPREDNISOLONE ACETATE 40 MG: 40 INJECTION, SUSPENSION INTRA-ARTICULAR; INTRALESIONAL; INTRAMUSCULAR; SOFT TISSUE at 14:30

## 2021-05-04 RX ADMIN — METHYLPREDNISOLONE ACETATE 40 MG: 40 INJECTION, SUSPENSION INTRA-ARTICULAR; INTRALESIONAL; INTRAMUSCULAR; SOFT TISSUE at 14:29

## 2021-05-04 NOTE — PROGRESS NOTES
The ABCs of the Annual Wellness Visit  Subsequent Medicare Wellness Visit    Chief Complaint   Patient presents with   • Medicare Wellness-subsequent       Subjective   History of Present Illness:  Rubén Rivas is a 85 y.o. male who presents for a Subsequent Medicare Wellness Visit.  Follow-up hypertension significant DJD.  Maintains follow-up with urology.  Maintains follow-up with GI has visited locally in [place] and will follow-up as needed.  Has maintained follow-up with gastroenterology at  after the ERCP.  Has been released.  Maintains follow-up with nephrology locally.  Cardiology locally.  Had orthopedic visit locally.  All available records have been reviewed.  Utilizing medications as reconciled.  Globally improving.  Energy slowly improving.  Sleep patterns reasonable.  Trying to be more active at home.  Denies respiratory CDV GI  skin changes.  Chronic orthopedic concerns persist.  Short-term memory has worsened.  Confirmed by spouse.    HEALTH RISK ASSESSMENT    Recent Hospitalizations:  Recently treated at the following:  Other: [facility].  [facility].    Current Medical Providers:  Patient Care Team:  Chapin Sánchez MD as PCP - General  Ze Carey MD as Consulting Physician (Urology)    Smoking Status:  Social History     Tobacco Use   Smoking Status Former Smoker   • Types: Cigarettes   • Quit date: 1976   • Years since quittin.8   Smokeless Tobacco Never Used       Alcohol Consumption:  Social History     Substance and Sexual Activity   Alcohol Use No       Depression Screen:   PHQ-2/PHQ-9 Depression Screening 2021   Little interest or pleasure in doing things 0   Feeling down, depressed, or hopeless 0   Trouble falling or staying asleep, or sleeping too much -   Feeling tired or having little energy -   Poor appetite or overeating -   Feeling bad about yourself - or that you are a failure or have let yourself or your family down -   Trouble  concentrating on things, such as reading the newspaper or watching television -   Moving or speaking so slowly that other people could have noticed. Or the opposite - being so fidgety or restless that you have been moving around a lot more than usual -   Thoughts that you would be better off dead, or of hurting yourself in some way -   Total Score 0   If you checked off any problems, how difficult have these problems made it for you to do your work, take care of things at home, or get along with other people? -       Fall Risk Screen:  STEADI Fall Risk Assessment was completed, and patient is at HIGH risk for falls. Assessment completed on:5/4/2021    Health Habits and Functional and Cognitive Screening:  Functional & Cognitive Status 5/4/2021   Do you have difficulty preparing food and eating? No   Do you have difficulty bathing yourself, getting dressed or grooming yourself? No   Do you have difficulty using the toilet? No   Do you have difficulty moving around from place to place? No   Do you have trouble with steps or getting out of a bed or a chair? No   Current Diet Well Balanced Diet   Dental Exam Up to date   Eye Exam Up to date   Exercise (times per week) 7 times per week   Current Exercise Activities Include Yard Work   Do you need help using the phone?  No   Are you deaf or do you have serious difficulty hearing?  No   Do you need help with transportation? No   Do you need help shopping? No   Do you need help preparing meals?  No   Do you need help with housework?  No   Do you need help with laundry? No   Do you need help taking your medications? No   Do you need help managing money? No   Do you ever drive or ride in a car without wearing a seat belt? No   Have you felt unusual stress, anger or loneliness in the last month? -   Who do you live with? -   If you need help, do you have trouble finding someone available to you? -   Have you been bothered in the last four weeks by sexual problems? -   Do you  have difficulty concentrating, remembering or making decisions? -         Does the patient have evidence of cognitive impairment? Yes    Asprin use counseling:Taking ASA appropriately as indicated    Age-appropriate Screening Schedule:  Refer to the list below for future screening recommendations based on patient's age, sex and/or medical conditions. Orders for these recommended tests are listed in the plan section. The patient has been provided with a written plan.    Health Maintenance   Topic Date Due   • ZOSTER VACCINE (1 of 2) Never done   • LIPID PANEL  06/02/2021   • INFLUENZA VACCINE  08/01/2021   • TDAP/TD VACCINES (3 - Td) 09/06/2027          The following portions of the patient's history were reviewed and updated as appropriate: allergies, current medications, past family history, past social history, past surgical history and problem list.    Outpatient Medications Prior to Visit   Medication Sig Dispense Refill   • amLODIPine (NORVASC) 5 MG tablet Take 5 mg by mouth Daily. as directed     • aspirin 81 MG tablet Take  by mouth daily.     • clopidogrel (PLAVIX) 75 MG tablet Take 1 tablet by mouth Daily. 90 tablet 3   • diphenhydrAMINE-acetaminophen (TYLENOL PM)  MG tablet per tablet Take 1 tablet by mouth At Night As Needed for sleep.     • escitalopram (LEXAPRO) 20 MG tablet Take 1 tablet by mouth Daily. 90 tablet 3   • FIBER PO Take  by mouth Daily.     • Glucose Blood (ONETOUCH ULTRA BLUE VI) daily.     • lactase (Lactaid) 3000 units tablet Take 1 tablet by mouth 3 (Three) Times a Day With Meals. 90 tablet 0   • metoprolol tartrate (LOPRESSOR) 25 MG tablet Take 1 tablet by mouth 2 (Two) Times a Day. 180 tablet 1   • Multiple Vitamins-Minerals (ONE-A-DAY MENS 50+ ADVANTAGE PO) Take  by mouth daily.     • mupirocin (BACTROBAN) 2 % ointment Apply  topically to the appropriate area as directed 3 (Three) Times a Day. 15 g 1   • pantoprazole (PROTONIX) 40 MG EC tablet Take 40 mg by mouth Daily.     •  "rosuvastatin (CRESTOR) 20 MG tablet Take 1 tablet by mouth Daily. 90 tablet 3   • tamsulosin (FLOMAX) 0.4 MG capsule 24 hr capsule Take 1 capsule by mouth once daily 90 capsule 3   • donepezil (ARICEPT) 5 MG tablet Take 1 tablet by mouth Every Night. 90 tablet 1   • traMADol (ULTRAM) 50 MG tablet Take 1 tablet by mouth Every 8 (Eight) Hours As Needed for Moderate Pain . 90 tablet 1     No facility-administered medications prior to visit.       Patient Active Problem List   Diagnosis   • Anemia   • Chronic coronary artery disease   • Chronic osteoarthritis   • Depression   • Elevated prostate specific antigen (PSA)   • Enlarged prostate   • Hyperlipidemia   • Essential hypertension   • Iron deficiency   • Low back pain   • Malignant neoplasm of prostate (CMS/HCC)   • Rheumatoid arthritis (CMS/HCC)   • Prostate cancer (CMS/HCC)   • Mild cognitive impairment with memory loss   • Stage 3 chronic kidney disease (CMS/HCC)   • Spinal stenosis of lumbar region   • DDD (degenerative disc disease), lumbar       Advanced Care Planning:  ACP discussion was held with the patient during this visit. Patient has an advance directive in EMR which is still valid.     Review of Systems    Compared to one year ago, the patient feels his physical health is better.  Compared to one year ago, the patient feels his mental health is the same.    Reviewed chart for potential of high risk medication in the elderly: yes  Reviewed chart for potential of harmful drug interactions in the elderly:yes    Objective         Vitals:    05/04/21 1337   BP: 124/55   BP Location: Right arm   Patient Position: Sitting   Cuff Size: Adult   Pulse: 58   Temp: 97.5 °F (36.4 °C)   TempSrc: Temporal   SpO2: 100%   Weight: 64.3 kg (141 lb 12.8 oz)   Height: 177.8 cm (70\")       Body mass index is 20.35 kg/m².  Discussed the patient's BMI with him. The BMI is in the acceptable range.    Physical Exam  Vitals reviewed. Exam conducted with a chaperone present. "   Constitutional:       Appearance: Normal appearance.   HENT:      Head: Normocephalic.   Cardiovascular:      Rate and Rhythm: Normal rate and regular rhythm.      Heart sounds: Normal heart sounds.   Pulmonary:      Effort: Pulmonary effort is normal.      Breath sounds: Normal breath sounds.   Musculoskeletal:      Cervical back: Normal range of motion and neck supple.   Skin:     General: Skin is warm and dry.   Neurological:      Mental Status: He is alert and oriented to person, place, and time.   Psychiatric:         Mood and Affect: Mood normal.               Assessment/Plan   Medicare Risks and Personalized Health Plan  CMS Preventative Services Quick Reference  Cardiovascular risk  Chronic Pain   Dementia/Memory     The above risks/problems have been discussed with the patient.  Pertinent information has been shared with the patient in the After Visit Summary.  Follow up plans and orders are seen below in the Assessment/Plan Section.    Diagnoses and all orders for this visit:    1. Essential hypertension (Primary)    2. Diarrhea, unspecified type  -     diphenoxylate-atropine (LOMOTIL) 2.5-0.025 MG per tablet; Take 1 tablet by mouth 4 (Four) Times a Day As Needed for Diarrhea.  Dispense: 30 tablet; Refill: 0    3. Mild cognitive impairment with memory loss  -     donepezil (ARICEPT) 10 MG tablet; Take 1 tablet by mouth Every Night.  Dispense: 90 tablet; Refill: 1    4. Medicare annual wellness visit, subsequent    5. Chronic low back pain, unspecified back pain laterality, unspecified whether sciatica present  -     methylPREDNISolone acetate (DEPO-medrol) injection 40 mg  -     methylPREDNISolone acetate (DEPO-medrol) injection 40 mg      Follow Up:  Return in about 2 months (around 7/4/2021).     An After Visit Summary and PPPS were given to the patient.     Will continue medications as reconciled ordered.  Increase the Aricept to 10 mg.  Injection.  You have received these on a fairly regular basis  with significant benefit.  Utilize the tramadol up to 3 times daily if needed.  You expressed no current interest in pain management referral.  Stay safely active.  Liberalize diet.  Maintain pandemic response.  Would ask you to recheck in about 2 months or as needed.  We also discussed recent metabolic monitoring from nephrology visit.

## 2021-05-04 NOTE — PROGRESS NOTES
"Subjective   Rubén Rivas is a 85 y.o. male.     History of Present Illness     {Common H&P Review Areas:25130}    Review of Systems  See history of Present Illness     Objective     Physical Exam    PHQ-9 Total Score: 0    Patient's (There is no height or weight on file to calculate BMI.) indicates that they are {Weightcategories:35752} with obesity-related health conditions that include {obesity comorbidities:85636} . Obesity is {new/improving/stable/worsenin}. BMI is {BMI plan ( NQF measure 421):37733}. We discussed {obesity treatment:65921::\"portion control\",\"increasing exercise\"}.   (Normal BMI:  18.5-24.9, OW 25-29.9, Obesity 30 or greater)      Assessment/Plan     {Assess/PlanSmartLinks:27812}                     This document has been electronically signed by Chapin Sánchez MD   May 4, 2021 13:37 EDT    Part of this note may be an electronic transcription/translation of spoken language to printed text using the Dragon Dictation System.  "

## 2021-05-06 DIAGNOSIS — F32.4 MAJOR DEPRESSIVE DISORDER WITH SINGLE EPISODE, IN PARTIAL REMISSION (HCC): ICD-10-CM

## 2021-05-06 RX ORDER — ESCITALOPRAM OXALATE 20 MG/1
TABLET ORAL
Qty: 90 TABLET | Refills: 0 | Status: SHIPPED | OUTPATIENT
Start: 2021-05-06 | End: 2021-08-05 | Stop reason: SDUPTHER

## 2021-05-20 DIAGNOSIS — M19.90 CHRONIC OSTEOARTHRITIS: ICD-10-CM

## 2021-05-20 RX ORDER — TRAMADOL HYDROCHLORIDE 50 MG/1
TABLET ORAL
Qty: 90 TABLET | Refills: 0 | Status: SHIPPED | OUTPATIENT
Start: 2021-05-20 | End: 2021-07-05

## 2021-05-21 DIAGNOSIS — R19.7 DIARRHEA, UNSPECIFIED TYPE: ICD-10-CM

## 2021-05-23 RX ORDER — DIPHENOXYLATE HYDROCHLORIDE AND ATROPINE SULFATE 2.5; .025 MG/1; MG/1
TABLET ORAL
Qty: 2 TABLET | Refills: 0 | Status: SHIPPED | OUTPATIENT
Start: 2021-05-23 | End: 2021-07-06

## 2021-06-17 RX ORDER — CLOPIDOGREL BISULFATE 75 MG/1
TABLET ORAL
Qty: 90 TABLET | Refills: 0 | Status: SHIPPED | OUTPATIENT
Start: 2021-06-17 | End: 2021-12-16

## 2021-07-01 DIAGNOSIS — M19.90 CHRONIC OSTEOARTHRITIS: ICD-10-CM

## 2021-07-05 RX ORDER — TRAMADOL HYDROCHLORIDE 50 MG/1
TABLET ORAL
Qty: 90 TABLET | Refills: 1 | Status: SHIPPED | OUTPATIENT
Start: 2021-07-05 | End: 2021-10-04

## 2021-07-06 ENCOUNTER — OFFICE VISIT (OUTPATIENT)
Dept: FAMILY MEDICINE CLINIC | Facility: CLINIC | Age: 86
End: 2021-07-06

## 2021-07-06 VITALS
BODY MASS INDEX: 20.33 KG/M2 | TEMPERATURE: 98.2 F | RESPIRATION RATE: 18 BRPM | OXYGEN SATURATION: 100 % | SYSTOLIC BLOOD PRESSURE: 140 MMHG | HEART RATE: 45 BPM | WEIGHT: 142 LBS | DIASTOLIC BLOOD PRESSURE: 66 MMHG | HEIGHT: 70 IN

## 2021-07-06 DIAGNOSIS — G89.29 CHRONIC LOW BACK PAIN, UNSPECIFIED BACK PAIN LATERALITY, UNSPECIFIED WHETHER SCIATICA PRESENT: ICD-10-CM

## 2021-07-06 DIAGNOSIS — M54.50 CHRONIC LOW BACK PAIN, UNSPECIFIED BACK PAIN LATERALITY, UNSPECIFIED WHETHER SCIATICA PRESENT: ICD-10-CM

## 2021-07-06 DIAGNOSIS — I10 ESSENTIAL HYPERTENSION: Primary | Chronic | ICD-10-CM

## 2021-07-06 DIAGNOSIS — M19.90 CHRONIC OSTEOARTHRITIS: Chronic | ICD-10-CM

## 2021-07-06 PROCEDURE — 99214 OFFICE O/P EST MOD 30 MIN: CPT | Performed by: FAMILY MEDICINE

## 2021-07-06 PROCEDURE — 96372 THER/PROPH/DIAG INJ SC/IM: CPT | Performed by: FAMILY MEDICINE

## 2021-07-06 RX ORDER — ATORVASTATIN CALCIUM 40 MG/1
40 TABLET, FILM COATED ORAL DAILY
COMMUNITY
Start: 2021-06-16 | End: 2021-12-20 | Stop reason: SINTOL

## 2021-07-06 RX ORDER — COLESEVELAM 180 1/1
625 TABLET ORAL 2 TIMES DAILY
COMMUNITY
Start: 2021-06-24

## 2021-07-06 RX ORDER — METHYLPREDNISOLONE ACETATE 80 MG/ML
80 INJECTION, SUSPENSION INTRA-ARTICULAR; INTRALESIONAL; INTRAMUSCULAR; SOFT TISSUE ONCE
Status: COMPLETED | OUTPATIENT
Start: 2021-07-06 | End: 2021-07-06

## 2021-07-06 RX ADMIN — METHYLPREDNISOLONE ACETATE 80 MG: 80 INJECTION, SUSPENSION INTRA-ARTICULAR; INTRALESIONAL; INTRAMUSCULAR; SOFT TISSUE at 15:34

## 2021-07-06 NOTE — PROGRESS NOTES
Subjective   Rubén Rivas is a 85 y.o. male.     History of Present Illness follow-up hypertension arthritis.  Globally doing better.  Has visited with GI.  Has maintained follow-ups elsewhere including nephrology and cardiology.  Available records reviewed denies respiratory CDV GI .  Arthritis pain is fairly stable controlled with current regimen which includes the steroid injections.    The following portions of the patient's history were reviewed and updated as appropriate: allergies, current medications, past family history, past medical history, past social history and problem list.    Review of Systems  See history of Present Illness     Objective     Physical Exam  Vitals reviewed.   Constitutional:       Appearance: Normal appearance.   HENT:      Head: Normocephalic.   Cardiovascular:      Rate and Rhythm: Normal rate and regular rhythm.      Heart sounds: Normal heart sounds.   Pulmonary:      Effort: Pulmonary effort is normal.      Breath sounds: Normal breath sounds.   Musculoskeletal:      Cervical back: Normal range of motion and neck supple.   Skin:     General: Skin is warm and dry.   Neurological:      Mental Status: He is alert and oriented to person, place, and time.   Psychiatric:         Mood and Affect: Mood normal.         PHQ-9 Total Score:      There is no height or weight on file to calculate BMI.       Assessment/Plan     Diagnoses and all orders for this visit:    1. Essential hypertension (Primary)    2. Chronic osteoarthritis  -     methylPREDNISolone acetate (DEPO-medrol) injection 80 mg    3. Chronic low back pain, unspecified back pain laterality, unspecified whether sciatica present  -     methylPREDNISolone acetate (DEPO-medrol) injection 80 mg    Weight appears to have stabilized.  At this time will continue chronic's.  Injection authorized.  You understand and accept the long-term risk regarding glycemia bone strength blood pressure control.  Stay safely active.  Follow-up in  about 2 months or as needed.                     This document has been electronically signed by Chapin Sánchez MD   July 6, 2021 14:24 EDT    Part of this note may be an electronic transcription/translation of spoken language to printed text using the Dragon Dictation System.

## 2021-07-26 LAB
EXTERNAL RESULTING LABORATORY: NORMAL
SARS-COV-2 RNA PNL SPEC NAA+PROBE: NOT DETECTED

## 2021-08-05 DIAGNOSIS — F32.4 MAJOR DEPRESSIVE DISORDER WITH SINGLE EPISODE, IN PARTIAL REMISSION (HCC): ICD-10-CM

## 2021-08-05 RX ORDER — ESCITALOPRAM OXALATE 20 MG/1
TABLET ORAL
Qty: 90 TABLET | Refills: 0 | Status: SHIPPED | OUTPATIENT
Start: 2021-08-05 | End: 2021-11-06 | Stop reason: SDUPTHER

## 2021-09-07 ENCOUNTER — TELEPHONE (OUTPATIENT)
Dept: UROLOGY | Facility: CLINIC | Age: 86
End: 2021-09-07

## 2021-09-07 NOTE — TELEPHONE ENCOUNTER
Patient requesting lab order for PSA be faxed to Chapin Sánchez office. Fax number is [phone].

## 2021-09-08 ENCOUNTER — LAB (OUTPATIENT)
Dept: FAMILY MEDICINE CLINIC | Facility: CLINIC | Age: 86
End: 2021-09-08

## 2021-09-08 DIAGNOSIS — C61 PROSTATE CANCER (HCC): Primary | ICD-10-CM

## 2021-09-10 ENCOUNTER — LAB (OUTPATIENT)
Dept: FAMILY MEDICINE CLINIC | Facility: CLINIC | Age: 86
End: 2021-09-10

## 2021-09-10 DIAGNOSIS — C61 PROSTATE CANCER (HCC): ICD-10-CM

## 2021-09-10 PROCEDURE — 84153 ASSAY OF PSA TOTAL: CPT | Performed by: UROLOGY

## 2021-09-10 PROCEDURE — 36415 COLL VENOUS BLD VENIPUNCTURE: CPT | Performed by: NURSE PRACTITIONER

## 2021-09-11 LAB — PSA SERPL-MCNC: <0.014 NG/ML (ref 0–4)

## 2021-09-15 ENCOUNTER — OFFICE VISIT (OUTPATIENT)
Dept: UROLOGY | Facility: CLINIC | Age: 86
End: 2021-09-15

## 2021-09-15 VITALS
DIASTOLIC BLOOD PRESSURE: 72 MMHG | BODY MASS INDEX: 21.9 KG/M2 | HEIGHT: 70 IN | WEIGHT: 153 LBS | SYSTOLIC BLOOD PRESSURE: 124 MMHG

## 2021-09-15 DIAGNOSIS — N40.0 BPH WITHOUT OBSTRUCTION/LOWER URINARY TRACT SYMPTOMS: ICD-10-CM

## 2021-09-15 DIAGNOSIS — C61 PROSTATE CANCER (HCC): Primary | ICD-10-CM

## 2021-09-15 PROCEDURE — 99214 OFFICE O/P EST MOD 30 MIN: CPT | Performed by: NURSE PRACTITIONER

## 2021-09-15 RX ORDER — TAMSULOSIN HYDROCHLORIDE 0.4 MG/1
1 CAPSULE ORAL DAILY
Qty: 90 CAPSULE | Refills: 5 | Status: SHIPPED | OUTPATIENT
Start: 2021-09-15 | End: 2021-11-06 | Stop reason: SDUPTHER

## 2021-09-15 RX ORDER — BUDESONIDE 3 MG/1
6 CAPSULE, COATED PELLETS ORAL DAILY
COMMUNITY
Start: 2021-08-25

## 2021-09-15 NOTE — PROGRESS NOTES
"Chief Complaint  Prostate Cancer (1 yr f/u)    Subjective          Mireille Woods presents to [facility]  History of Present Illness    MR. MIREILLE WOODS is a very pleasant 85-year-old male with a significant history of prostate cancer, who returns to clinic today with his wife of over 65 years for evaluation.  This is his one year follow-up on BPH and prostate cancer.  The patient is in no apparent distress today and reports doing relatively well over this last year.  His most recent PSA is <0.014 on 09/10/2021.  His PSA 1 year prior was< 0.014.  Patient is currently on Flomax daily.    Patient was diagnosed with  prostate cancer stage T2b in 2014.  He had 12/12 biopsies positive for Marino score of 7.  He was treated with radiation therapy that finished in 2015.  Post therapy he had 2 years of Lupron injections.  His last Lupron was in September 2016,  On evaluation today he denies having any more hot flashes, he denies bone pain, denies increased fatigue.      Patient reports currently seeing Dr. Diana for stage III kidney disease.  He is currently on steroids injections, and reports tolerating them well. He  Is very high spirited, with a very strong fide. He has nocturia 1-2, depending on his late night fluid intake.  He denies having any urinary symptoms of frequency, urgency, or dysuria.  He denies any burning with urination, urine hesitancy or postvoid dribbling.  He denies pelvic pressure or suprapubic discomfort.  Denies abdominal pain, lower back pain, flank pain, he denies any CVA tenderness.  No N/V/D, he denies chills or fevers.  He is unable to give us any urine sample today, his IPSS score is 3, his PVR is 11 cc.    The rest of his PMHx as listed below.    Objective   Vital Signs:   /72   Ht 177.8 cm (70\")   Wt 69.4 kg (153 lb)   BMI 21.95 kg/m²     Physical Exam  Constitutional:       General: He is in acute distress.      Appearance: He is " well-developed.   HENT:      Head: Normocephalic and atraumatic.      Right Ear: External ear normal.      Left Ear: External ear normal.   Eyes:      General:         Right eye: No discharge.         Left eye: No discharge.      Conjunctiva/sclera: Conjunctivae normal.      Pupils: Pupils are equal, round, and reactive to light.   Neck:      Thyroid: No thyromegaly.      Trachea: No tracheal deviation.   Cardiovascular:      Rate and Rhythm: Normal rate and regular rhythm.      Heart sounds: No murmur heard.   No friction rub.   Pulmonary:      Effort: Pulmonary effort is normal. No respiratory distress.      Breath sounds: Normal breath sounds. No stridor.   Abdominal:      General: Bowel sounds are normal. There is no distension.      Palpations: Abdomen is soft.      Tenderness: There is abdominal tenderness. There is no guarding.   Genitourinary:     Penis: Normal and uncircumcised. No tenderness or discharge.       Testes: Normal.      Rectum: Normal. Guaiac result negative.      Comments: Reports nocturia, denies frequency, urgency, or dysuria  Musculoskeletal:         General: Tenderness present. No deformity. Normal range of motion.      Cervical back: Normal range of motion and neck supple.   Skin:     General: Skin is warm and dry.      Capillary Refill: Capillary refill takes less than 2 seconds.      Coloration: Skin is not pale.   Neurological:      Mental Status: He is alert and oriented to person, place, and time.      Cranial Nerves: No cranial nerve deficit.      Coordination: Coordination normal.   Psychiatric:         Behavior: Behavior normal.         Thought Content: Thought content normal.         Judgment: Judgment normal.        Result Review :     CMP    CMP 1/22/21 2/22/21 4/14/21   Glucose 95 113 (A) 87   BUN 15 16 18   Creatinine 1.40 (A) 1.28 (A) 1.31 (A)   eGFR Non  Am 48 (A) 54 (A) 52 (A)   Sodium 140 138 138   Potassium 4.2 4.8 5.2   Chloride 105 105 104   Calcium 8.6 8.6 9.4    Albumin 2.82 (A) 3.10 (A) 3.60   Total Bilirubin 0.9 0.6 0.3   Alkaline Phosphatase 482 (A) 280 (A) 135 (A)   AST (SGOT) 46 (A) 32 34   ALT (SGPT) 36 18 23   (A) Abnormal value            CBC    CBC 1/22/21 2/22/21 4/14/21   WBC 7.73 6.08 5.79   RBC 2.76 (A) 2.99 (A) 3.40 (A)   Hemoglobin 8.5 (A) 9.3 (A) 11.0 (A)   Hematocrit 27.3 (A) 28.4 (A) 32.7 (A)   MCV 98.9 (A) 95.0 96.2   MCH 30.8 31.1 32.4   MCHC 31.1 (A) 32.7 33.6   RDW 16.6 (A) 13.8 13.1   Platelets 290 243 229   (A) Abnormal value                UA    Urinalysis 2/22/21 2/22/21 4/14/21    1355 1355    Specific [place], UA 1.018 1.018 1.015   Ketones, UA Negative Negative Negative   Blood, UA Negative Negative Negative   Leukocytes, UA Negative Negative Negative   Nitrite, UA Negative Negative Negative               PSA    PSA 2/22/21 9/10/21   PSA <0.014 <0.014                     Assessment and Plan    Diagnoses and all orders for this visit:    1. Prostate cancer (CMS/HCC) (Primary)  -     PSA DIAGNOSTIC; Future  -     tamsulosin (FLOMAX) 0.4 MG capsule 24 hr capsule; Take 1 capsule by mouth Daily.  Dispense: 90 capsule; Refill: 5    2. BPH without obstruction/lower urinary tract symptoms  -     PSA DIAGNOSTIC; Future  -     tamsulosin (FLOMAX) 0.4 MG capsule 24 hr capsule; Take 1 capsule by mouth Daily.  Dispense: 90 capsule; Refill: 5        PLAN  BPH: WE Discussed the pathophysiology of BPH and obstruction. We discussed the static and dynamic effects of BPH as well as using 5 alpha reductase inhibitors versus alpha blockade.  We discussed the indications for transurethral surgery as well.  And/ or other therapeutic options available including all of the newer techniques.  He has no real symptomatology referable to his prostate post radiation therapy.  Rather than proceed with an expensive workup he doesn't need, at this time I am recommending he continue with an alpha blocker tamsulosin 0.4 mg capsule daily. WE Discussed medical therapy with  Flomax, and I also explained how on Flomax, it relaxes, so that he can he can always urinate better.    PROSTATE CANCER: WE discussed the natural history of prostate cancer and ongoing controversy regarding screening and potential treatment outcomes. The meaning of a false positive PSA and a false negative PSA has been discussed. He indicates understanding of the limitations of this screening test and he is willing to proceed with repeat yearly PSA testing.    We will follow-up with patient next year, with PSA results, or sooner if need be.    Patient/wife agreeable plan of care.      Follow Up   Return in about 1 year (around 9/15/2022) for Next scheduled follow up WITH PSA PRIOR.  Patient was given instructions and counseling regarding his condition or for health maintenance advice. Please see specific information pulled into the AVS if appropriate.

## 2021-09-15 NOTE — PATIENT INSTRUCTIONS
Prostate Cancer    The prostate is a male gland that helps make semen. It is located below a man's bladder, in front of the rectum. Prostate cancer is when abnormal cells grow in this gland.  What are the causes?  The cause of this condition is not known.  What increases the risk?  You are more likely to develop this condition if:  · You are 65 years of age or older.  · You are .  · You have a family history of prostate cancer.  · You have a family history of breast cancer.  What are the signs or symptoms?  Symptoms of this condition include:  · A need to pee often.  · Peeing that is weak, or pee that stops and starts.  · Trouble starting or stopping your pee.  · Inability to pee.  · Blood in your pee or semen.  · Pain in the lower back, lower belly (abdomen), hips, or upper thighs.  · Trouble getting an erection.  · Trouble emptying all of your pee.  How is this treated?  Treatment for this condition depends on your age, your health, the kind of treatment you like, and how far the cancer has spread. Treatments include:  · Being watched. This is called observation. You will be tested from time to time, but you will not get treated. Tests are to make sure that the cancer is not growing.  · Surgery. This may be done to remove the prostate, to remove the testicles, or to freeze or kill cancer cells.  · Radiation. This uses a strong beam to kill cancer cells.  · Ultrasound energy. This uses strong sound waves to kill cancer cells.  · Chemotherapy. This uses medicines that stop cancer cells from increasing. This kills cancer cells and healthy cells.  · Targeted therapy. This kills cancer cells only. Healthy cells are not affected.  · Hormone treatment. This stops the body from making hormones that help the cancer cells to grow.  Follow these instructions at home:  · Take over-the-counter and prescription medicines only as told by your doctor.  · Eat a healthy diet.  · Get plenty of sleep.  · Ask your  doctor for help to find a support group for men with prostate cancer.  · If you have to go to the hospital, let your cancer doctor (oncologist) know.  · Treatment may affect your ability to have sex. Touch, hold, hug, and caress your partner to have intimate moments.  · Keep all follow-up visits as told by your doctor. This is important.  Contact a doctor if:  · You have new or more trouble peeing.  · You have new or more blood in your pee.  · You have new or more pain in your hips, back, or chest.  Get help right away if:  · You have weakness in your legs.  · You lose feeling in your legs.  · You cannot control your pee or your poop (stool).  · You have chills or a fever.  Summary  · The prostate is a male gland that helps make semen.  · Prostate cancer is when abnormal cells grow in this gland.  · Treatment includes doing surgery, using medicines, using very strong beams, or watching without treatment.  · Ask your doctor for help to find a support group for men with prostate cancer.  · Contact a doctor if you have problems peeing or have any new pain that you did not have before.  This information is not intended to replace advice given to you by your health care provider. Make sure you discuss any questions you have with your health care provider.  Document Revised: 12/01/2020 Document Reviewed: 12/01/2020  Elsevier Patient Education © 2021 [employer]

## 2021-09-20 ENCOUNTER — OFFICE VISIT (OUTPATIENT)
Dept: FAMILY MEDICINE CLINIC | Facility: CLINIC | Age: 86
End: 2021-09-20

## 2021-09-20 VITALS
HEIGHT: 70 IN | DIASTOLIC BLOOD PRESSURE: 74 MMHG | TEMPERATURE: 97.3 F | OXYGEN SATURATION: 98 % | HEART RATE: 64 BPM | SYSTOLIC BLOOD PRESSURE: 127 MMHG | WEIGHT: 153 LBS | BODY MASS INDEX: 21.9 KG/M2

## 2021-09-20 DIAGNOSIS — M48.061 SPINAL STENOSIS OF LUMBAR REGION, UNSPECIFIED WHETHER NEUROGENIC CLAUDICATION PRESENT: Chronic | ICD-10-CM

## 2021-09-20 DIAGNOSIS — M19.90 CHRONIC OSTEOARTHRITIS: Primary | Chronic | ICD-10-CM

## 2021-09-20 PROBLEM — K57.90 DIVERTICULOSIS: Status: ACTIVE | Noted: 2021-09-20

## 2021-09-20 PROBLEM — M51.36 DDD (DEGENERATIVE DISC DISEASE), LUMBAR: Chronic | Status: ACTIVE | Noted: 2021-03-10

## 2021-09-20 PROCEDURE — 96372 THER/PROPH/DIAG INJ SC/IM: CPT | Performed by: FAMILY MEDICINE

## 2021-09-20 PROCEDURE — 99214 OFFICE O/P EST MOD 30 MIN: CPT | Performed by: FAMILY MEDICINE

## 2021-09-20 RX ORDER — METHYLPREDNISOLONE ACETATE 80 MG/ML
80 INJECTION, SUSPENSION INTRA-ARTICULAR; INTRALESIONAL; INTRAMUSCULAR; SOFT TISSUE ONCE
Status: COMPLETED | OUTPATIENT
Start: 2021-09-20 | End: 2021-09-20

## 2021-09-20 RX ADMIN — METHYLPREDNISOLONE ACETATE 80 MG: 80 INJECTION, SUSPENSION INTRA-ARTICULAR; INTRALESIONAL; INTRAMUSCULAR; SOFT TISSUE at 16:10

## 2021-09-20 NOTE — PROGRESS NOTES
Subjective   Rubén Rivas is a 85 y.o. male.     History of Present Illness follow-up regarding arthritis spinal stenosis.  Maintains visits with GI and had recent colonoscopy which was good.  Is weaning off of the oral steroid.  Maintains visit with cardiology.  Available records reviewed.  Denies respiratory CDV current GI or  changes.  States feels best that has in a long time.  Feels that the injectable steroid long-acting medication helps immensely with back.  Has been maintaining safe pandemic response.    The following portions of the patient's history were reviewed and updated as appropriate: allergies, current medications, past family history, past social history, past surgical history and problem list.    Review of Systems  See history of Present Illness     Objective     Physical Exam  Vitals reviewed.   Constitutional:       Appearance: Normal appearance.   HENT:      Head: Normocephalic.   Cardiovascular:      Rate and Rhythm: Normal rate and regular rhythm.      Heart sounds: Normal heart sounds.   Pulmonary:      Effort: Pulmonary effort is normal.      Breath sounds: Normal breath sounds.   Musculoskeletal:         General: Normal range of motion.      Cervical back: Normal range of motion and neck supple.      Right lower leg: No edema.      Left lower leg: No edema.   Skin:     General: Skin is warm and dry.   Neurological:      Mental Status: He is alert and oriented to person, place, and time.   Psychiatric:         Mood and Affect: Mood normal.         PHQ-9 Total Score:      Body mass index is 21.95 kg/m².       Assessment/Plan     Diagnoses and all orders for this visit:    1. Chronic osteoarthritis (Primary)  -     methylPREDNISolone acetate (DEPO-medrol) injection 80 mg    2. Spinal stenosis of lumbar region, unspecified whether neurogenic claudication present  -     methylPREDNISolone acetate (DEPO-medrol) injection 80 mg    Injection authorized.  Stay safely active.  Maintain pandemic  response.  Keep follow-ups with cardiology GI.  Would recheck here in about 3 months or as needed.  Flu vaccine soon.  Covid booster when available.                     This document has been electronically signed by Chapin Sánchez MD   September 20, 2021 15:41 EDT    Part of this note may be an electronic transcription/translation of spoken language to printed text using the Dragon Dictation System.

## 2021-09-30 DIAGNOSIS — M19.90 CHRONIC OSTEOARTHRITIS: ICD-10-CM

## 2021-10-04 RX ORDER — TRAMADOL HYDROCHLORIDE 50 MG/1
TABLET ORAL
Qty: 90 TABLET | Refills: 0 | Status: SHIPPED | OUTPATIENT
Start: 2021-10-04 | End: 2021-11-11

## 2021-10-06 DIAGNOSIS — M19.90 CHRONIC OSTEOARTHRITIS: ICD-10-CM

## 2021-10-07 RX ORDER — TRAMADOL HYDROCHLORIDE 50 MG/1
TABLET ORAL
Qty: 90 TABLET | Refills: 0 | OUTPATIENT
Start: 2021-10-07

## 2021-10-27 ENCOUNTER — LAB (OUTPATIENT)
Dept: FAMILY MEDICINE CLINIC | Facility: CLINIC | Age: 86
End: 2021-10-27

## 2021-10-27 ENCOUNTER — TRANSCRIBE ORDERS (OUTPATIENT)
Dept: FAMILY MEDICINE CLINIC | Facility: CLINIC | Age: 86
End: 2021-10-27

## 2021-10-27 DIAGNOSIS — N18.30 STAGE 3 CHRONIC KIDNEY DISEASE, UNSPECIFIED WHETHER STAGE 3A OR 3B CKD (HCC): Primary | ICD-10-CM

## 2021-10-27 DIAGNOSIS — N18.30 STAGE 3 CHRONIC KIDNEY DISEASE, UNSPECIFIED WHETHER STAGE 3A OR 3B CKD (HCC): ICD-10-CM

## 2021-10-27 PROCEDURE — 84156 ASSAY OF PROTEIN URINE: CPT | Performed by: INTERNAL MEDICINE

## 2021-10-27 PROCEDURE — 82570 ASSAY OF URINE CREATININE: CPT | Performed by: INTERNAL MEDICINE

## 2021-10-27 PROCEDURE — 82043 UR ALBUMIN QUANTITATIVE: CPT | Performed by: INTERNAL MEDICINE

## 2021-10-27 PROCEDURE — 36415 COLL VENOUS BLD VENIPUNCTURE: CPT | Performed by: NURSE PRACTITIONER

## 2021-10-27 PROCEDURE — 80053 COMPREHEN METABOLIC PANEL: CPT | Performed by: INTERNAL MEDICINE

## 2021-10-28 LAB
ALBUMIN SERPL-MCNC: 4.1 G/DL (ref 3.5–5.2)
ALBUMIN UR-MCNC: 34.9 MG/DL
ALBUMIN/GLOB SERPL: 1.9 G/DL
ALP SERPL-CCNC: 34 U/L (ref 39–117)
ALT SERPL W P-5'-P-CCNC: 21 U/L (ref 1–41)
ANION GAP SERPL CALCULATED.3IONS-SCNC: 11.4 MMOL/L (ref 5–15)
AST SERPL-CCNC: 22 U/L (ref 1–40)
BILIRUB SERPL-MCNC: 0.3 MG/DL (ref 0–1.2)
BUN SERPL-MCNC: 24 MG/DL (ref 8–23)
BUN/CREAT SERPL: 16.7 (ref 7–25)
CALCIUM SPEC-SCNC: 9.2 MG/DL (ref 8.6–10.5)
CHLORIDE SERPL-SCNC: 107 MMOL/L (ref 98–107)
CO2 SERPL-SCNC: 25.6 MMOL/L (ref 22–29)
CREAT SERPL-MCNC: 1.44 MG/DL (ref 0.76–1.27)
CREAT UR-MCNC: 87.6 MG/DL
CREAT UR-MCNC: 87.6 MG/DL
GFR SERPL CREATININE-BSD FRML MDRD: 47 ML/MIN/1.73
GLOBULIN UR ELPH-MCNC: 2.2 GM/DL
GLUCOSE SERPL-MCNC: 90 MG/DL (ref 65–99)
MICROALBUMIN/CREAT UR: 398.4 MG/G
POTASSIUM SERPL-SCNC: 4.5 MMOL/L (ref 3.5–5.2)
PROT SERPL-MCNC: 6.3 G/DL (ref 6–8.5)
PROT UR-MCNC: 56 MG/DL
PROT/CREAT UR: 639.3 MG/G CREA (ref 0–200)
SODIUM SERPL-SCNC: 144 MMOL/L (ref 136–145)

## 2021-11-04 DIAGNOSIS — N40.0 BPH WITHOUT OBSTRUCTION/LOWER URINARY TRACT SYMPTOMS: ICD-10-CM

## 2021-11-04 DIAGNOSIS — C61 PROSTATE CANCER (HCC): ICD-10-CM

## 2021-11-04 DIAGNOSIS — F32.4 MAJOR DEPRESSIVE DISORDER WITH SINGLE EPISODE, IN PARTIAL REMISSION (HCC): ICD-10-CM

## 2021-11-05 ENCOUNTER — FLU SHOT (OUTPATIENT)
Dept: FAMILY MEDICINE CLINIC | Facility: CLINIC | Age: 86
End: 2021-11-05

## 2021-11-05 DIAGNOSIS — Z23 FLU VACCINE NEED: Primary | ICD-10-CM

## 2021-11-05 PROCEDURE — 90662 IIV NO PRSV INCREASED AG IM: CPT | Performed by: NURSE PRACTITIONER

## 2021-11-05 PROCEDURE — G0008 ADMIN INFLUENZA VIRUS VAC: HCPCS | Performed by: NURSE PRACTITIONER

## 2021-11-06 RX ORDER — TAMSULOSIN HYDROCHLORIDE 0.4 MG/1
CAPSULE ORAL
Qty: 90 CAPSULE | Refills: 0 | Status: SHIPPED | OUTPATIENT
Start: 2021-11-06 | End: 2021-11-11

## 2021-11-06 RX ORDER — ESCITALOPRAM OXALATE 20 MG/1
TABLET ORAL
Qty: 90 TABLET | Refills: 0 | Status: SHIPPED | OUTPATIENT
Start: 2021-11-06 | End: 2021-11-11

## 2021-11-11 DIAGNOSIS — C61 PROSTATE CANCER (HCC): ICD-10-CM

## 2021-11-11 DIAGNOSIS — N40.0 BPH WITHOUT OBSTRUCTION/LOWER URINARY TRACT SYMPTOMS: ICD-10-CM

## 2021-11-11 DIAGNOSIS — F32.4 MAJOR DEPRESSIVE DISORDER WITH SINGLE EPISODE, IN PARTIAL REMISSION (HCC): ICD-10-CM

## 2021-11-11 DIAGNOSIS — M19.90 CHRONIC OSTEOARTHRITIS: ICD-10-CM

## 2021-11-11 RX ORDER — TRAMADOL HYDROCHLORIDE 50 MG/1
TABLET ORAL
Qty: 90 TABLET | Refills: 3 | Status: SHIPPED | OUTPATIENT
Start: 2021-11-11 | End: 2022-04-22

## 2021-11-11 RX ORDER — TAMSULOSIN HYDROCHLORIDE 0.4 MG/1
CAPSULE ORAL
Qty: 90 CAPSULE | Refills: 0 | Status: SHIPPED | OUTPATIENT
Start: 2021-11-11 | End: 2022-05-12

## 2021-11-11 RX ORDER — ESCITALOPRAM OXALATE 20 MG/1
TABLET ORAL
Qty: 90 TABLET | Refills: 0 | Status: SHIPPED | OUTPATIENT
Start: 2021-11-11 | End: 2022-05-06 | Stop reason: SDUPTHER

## 2021-11-11 RX ORDER — PANTOPRAZOLE SODIUM 40 MG/1
TABLET, DELAYED RELEASE ORAL
Qty: 90 TABLET | Refills: 0 | Status: SHIPPED | OUTPATIENT
Start: 2021-11-11 | End: 2022-02-17 | Stop reason: SDUPTHER

## 2021-11-23 ENCOUNTER — TRANSCRIBE ORDERS (OUTPATIENT)
Dept: FAMILY MEDICINE CLINIC | Facility: CLINIC | Age: 86
End: 2021-11-23

## 2021-11-23 ENCOUNTER — CLINICAL SUPPORT (OUTPATIENT)
Dept: FAMILY MEDICINE CLINIC | Facility: CLINIC | Age: 86
End: 2021-11-23

## 2021-11-23 DIAGNOSIS — I10 BENIGN ESSENTIAL HYPERTENSION: Primary | ICD-10-CM

## 2021-11-23 DIAGNOSIS — I10 BENIGN ESSENTIAL HYPERTENSION: ICD-10-CM

## 2021-11-23 PROCEDURE — 36415 COLL VENOUS BLD VENIPUNCTURE: CPT | Performed by: INTERNAL MEDICINE

## 2021-11-23 PROCEDURE — 80053 COMPREHEN METABOLIC PANEL: CPT | Performed by: INTERNAL MEDICINE

## 2021-11-23 PROCEDURE — 82570 ASSAY OF URINE CREATININE: CPT | Performed by: INTERNAL MEDICINE

## 2021-11-23 PROCEDURE — 81001 URINALYSIS AUTO W/SCOPE: CPT | Performed by: INTERNAL MEDICINE

## 2021-11-23 PROCEDURE — 84156 ASSAY OF PROTEIN URINE: CPT | Performed by: INTERNAL MEDICINE

## 2021-11-23 PROCEDURE — 82043 UR ALBUMIN QUANTITATIVE: CPT | Performed by: INTERNAL MEDICINE

## 2021-11-23 NOTE — PROGRESS NOTES
Venipuncture Blood Specimen Collection  Venipuncture performed in Left Arm by Summer Landry MA with good hemostasis. Patient tolerated the procedure well without complications.   11/23/21   Summer Landry MA

## 2021-11-24 LAB
ALBUMIN SERPL-MCNC: 3.9 G/DL (ref 3.5–5.2)
ALBUMIN UR-MCNC: 17.1 MG/DL
ALBUMIN/GLOB SERPL: 2 G/DL
ALP SERPL-CCNC: 33 U/L (ref 39–117)
ALT SERPL W P-5'-P-CCNC: 22 U/L (ref 1–41)
ANION GAP SERPL CALCULATED.3IONS-SCNC: 9.5 MMOL/L (ref 5–15)
AST SERPL-CCNC: 23 U/L (ref 1–40)
BACTERIA UR QL AUTO: NORMAL /HPF
BILIRUB SERPL-MCNC: 0.2 MG/DL (ref 0–1.2)
BILIRUB UR QL STRIP: NEGATIVE
BUN SERPL-MCNC: 32 MG/DL (ref 8–23)
BUN/CREAT SERPL: 19.5 (ref 7–25)
CALCIUM SPEC-SCNC: 9.1 MG/DL (ref 8.6–10.5)
CHLORIDE SERPL-SCNC: 110 MMOL/L (ref 98–107)
CLARITY UR: CLEAR
CO2 SERPL-SCNC: 25.5 MMOL/L (ref 22–29)
COLOR UR: YELLOW
CREAT SERPL-MCNC: 1.64 MG/DL (ref 0.76–1.27)
CREAT UR-MCNC: 83 MG/DL
GFR SERPL CREATININE-BSD FRML MDRD: 40 ML/MIN/1.73
GLOBULIN UR ELPH-MCNC: 2 GM/DL
GLUCOSE SERPL-MCNC: 103 MG/DL (ref 65–99)
GLUCOSE UR STRIP-MCNC: NEGATIVE MG/DL
HGB UR QL STRIP.AUTO: NEGATIVE
HYALINE CASTS UR QL AUTO: NORMAL /LPF
KETONES UR QL STRIP: NEGATIVE
LEUKOCYTE ESTERASE UR QL STRIP.AUTO: NEGATIVE
NITRITE UR QL STRIP: NEGATIVE
PH UR STRIP.AUTO: 6 [PH] (ref 5–8)
POTASSIUM SERPL-SCNC: 4.1 MMOL/L (ref 3.5–5.2)
PROT ?TM UR-MCNC: 28 MG/DL
PROT SERPL-MCNC: 5.9 G/DL (ref 6–8.5)
PROT UR QL STRIP: ABNORMAL
PROT/CREAT UR: 337.3 MG/G CREA (ref 0–200)
RBC # UR STRIP: NORMAL /HPF
REF LAB TEST METHOD: NORMAL
SODIUM SERPL-SCNC: 145 MMOL/L (ref 136–145)
SP GR UR STRIP: 1.02 (ref 1–1.03)
SQUAMOUS #/AREA URNS HPF: NORMAL /HPF
UROBILINOGEN UR QL STRIP: ABNORMAL
WBC # UR STRIP: NORMAL /HPF

## 2021-12-02 DIAGNOSIS — G31.84 MILD COGNITIVE IMPAIRMENT WITH MEMORY LOSS: Chronic | ICD-10-CM

## 2021-12-02 RX ORDER — DONEPEZIL HYDROCHLORIDE 10 MG/1
TABLET, FILM COATED ORAL
Qty: 90 TABLET | Refills: 0 | Status: SHIPPED | OUTPATIENT
Start: 2021-12-02 | End: 2022-03-03

## 2021-12-16 RX ORDER — CLOPIDOGREL BISULFATE 75 MG/1
TABLET ORAL
Qty: 90 TABLET | Refills: 0 | Status: SHIPPED | OUTPATIENT
Start: 2021-12-16 | End: 2022-03-17 | Stop reason: SDUPTHER

## 2021-12-20 ENCOUNTER — OFFICE VISIT (OUTPATIENT)
Dept: FAMILY MEDICINE CLINIC | Facility: CLINIC | Age: 86
End: 2021-12-20

## 2021-12-20 VITALS
TEMPERATURE: 97.1 F | OXYGEN SATURATION: 100 % | SYSTOLIC BLOOD PRESSURE: 126 MMHG | WEIGHT: 160.4 LBS | HEIGHT: 70 IN | BODY MASS INDEX: 22.96 KG/M2 | HEART RATE: 62 BPM | DIASTOLIC BLOOD PRESSURE: 58 MMHG

## 2021-12-20 DIAGNOSIS — R19.7 DIARRHEA, UNSPECIFIED TYPE: ICD-10-CM

## 2021-12-20 DIAGNOSIS — M19.90 CHRONIC OSTEOARTHRITIS: Primary | ICD-10-CM

## 2021-12-20 DIAGNOSIS — E78.2 MIXED HYPERLIPIDEMIA: ICD-10-CM

## 2021-12-20 PROCEDURE — 99214 OFFICE O/P EST MOD 30 MIN: CPT | Performed by: FAMILY MEDICINE

## 2021-12-20 PROCEDURE — 96372 THER/PROPH/DIAG INJ SC/IM: CPT | Performed by: FAMILY MEDICINE

## 2021-12-20 PROCEDURE — 36415 COLL VENOUS BLD VENIPUNCTURE: CPT | Performed by: FAMILY MEDICINE

## 2021-12-20 PROCEDURE — 80061 LIPID PANEL: CPT | Performed by: FAMILY MEDICINE

## 2021-12-20 PROCEDURE — 85025 COMPLETE CBC W/AUTO DIFF WBC: CPT | Performed by: FAMILY MEDICINE

## 2021-12-20 RX ORDER — ROSUVASTATIN CALCIUM 20 MG/1
20 TABLET, COATED ORAL DAILY
Qty: 90 TABLET | Refills: 3 | Status: SHIPPED | OUTPATIENT
Start: 2021-12-20 | End: 2023-01-27 | Stop reason: SDUPTHER

## 2021-12-20 RX ORDER — METHYLPREDNISOLONE ACETATE 80 MG/ML
80 INJECTION, SUSPENSION INTRA-ARTICULAR; INTRALESIONAL; INTRAMUSCULAR; SOFT TISSUE ONCE
Status: COMPLETED | OUTPATIENT
Start: 2021-12-20 | End: 2021-12-20

## 2021-12-20 RX ORDER — DIPHENOXYLATE HYDROCHLORIDE AND ATROPINE SULFATE 2.5; .025 MG/1; MG/1
1 TABLET ORAL 4 TIMES DAILY PRN
Qty: 20 TABLET | Refills: 0 | Status: SHIPPED | OUTPATIENT
Start: 2021-12-20 | End: 2023-02-15 | Stop reason: SDUPTHER

## 2021-12-20 RX ORDER — LOSARTAN POTASSIUM 25 MG/1
TABLET ORAL
COMMUNITY
Start: 2021-12-02

## 2021-12-20 RX ADMIN — METHYLPREDNISOLONE ACETATE 80 MG: 80 INJECTION, SUSPENSION INTRA-ARTICULAR; INTRALESIONAL; INTRAMUSCULAR; SOFT TISSUE at 10:26

## 2021-12-20 NOTE — PROGRESS NOTES
Subjective   Rubén Rivas is a 86 y.o. male.     History of Present Illness follow-up to osteoarthritis.  Hypertension.  Overall doing fairly well although has had problems with muscle cramps felt was related to the atorvastatin possibly.  Cardiology has been managing some aspects.  GI now has on routine Entocort daily.  Desires something for episodic rare diarrhea.  Denies respiratory CDV  symptoms presently.  Did visit with urologist also.  All available records have been reviewed.    The following portions of the patient's history were reviewed and updated as appropriate: allergies, current medications, past medical history, past social history, past surgical history and problem list.    Review of Systems  See history of Present Illness     Objective     Physical Exam  Vitals reviewed.   Constitutional:       Appearance: Normal appearance.   HENT:      Head: Normocephalic.   Cardiovascular:      Rate and Rhythm: Normal rate and regular rhythm.      Heart sounds: Normal heart sounds.   Pulmonary:      Effort: Pulmonary effort is normal.      Breath sounds: Normal breath sounds.   Musculoskeletal:      Cervical back: Normal range of motion and neck supple.   Skin:     General: Skin is warm and dry.   Neurological:      Mental Status: He is alert and oriented to person, place, and time.   Psychiatric:         Mood and Affect: Mood normal.         PHQ-9 Total Score:       Body mass index is 23.02 kg/m².       Assessment/Plan     Diagnoses and all orders for this visit:    1. Chronic osteoarthritis (Primary)  -     methylPREDNISolone acetate (DEPO-medrol) injection 80 mg  -     CBC & Differential    2. Mixed hyperlipidemia  -     rosuvastatin (CRESTOR) 20 MG tablet; Take 1 tablet by mouth Daily.  Dispense: 90 tablet; Refill: 3  -     Lipid Panel    3. Diarrhea, unspecified type  -     diphenoxylate-atropine (Lomotil) 2.5-0.025 MG per tablet; Take 1 tablet by mouth 4 (Four) Times a Day As Needed for Diarrhea.   Dispense: 20 tablet; Refill: 0  -     CBC & Differential    In regards to the possible adverse effect of the current statin we will try rosuvastatin again and discontinue the Lipitor.  Methylprednisolone IM.  Lomotil as needed for severe diarrhea.  Patel has been reviewed.  We will ask you to follow-up in about 3 to 4 months or as needed.  Stay safely active  with reasonable heart healthy diet.  Keep follow-up with urology nephrology cardiology.                     This document has been electronically signed by Chapin Sánchez MD   December 20, 2021 09:23 EST    Part of this note may be an electronic transcription/translation of spoken language to printed text using the Dragon Dictation System.

## 2021-12-20 NOTE — PROGRESS NOTES
Venipuncture Blood Specimen Collection  Venipuncture performed in Left arm by Lauren Jiménez MA with good hemostasis. Patient tolerated the procedure well without complications.   12/20/21   Lauren Jiménez MA      Injection  Injection performed in left arm by Lauren Jiménez MA. Patient tolerated the procedure well without complications.  12/20/21   Lauren Jiménez MA

## 2021-12-21 LAB
BASOPHILS # BLD AUTO: 0.05 10*3/MM3 (ref 0–0.2)
BASOPHILS NFR BLD AUTO: 0.7 % (ref 0–1.5)
CHOLEST SERPL-MCNC: 151 MG/DL (ref 0–200)
DEPRECATED RDW RBC AUTO: 43.8 FL (ref 37–54)
EOSINOPHIL # BLD AUTO: 0.09 10*3/MM3 (ref 0–0.4)
EOSINOPHIL NFR BLD AUTO: 1.2 % (ref 0.3–6.2)
ERYTHROCYTE [DISTWIDTH] IN BLOOD BY AUTOMATED COUNT: 12.6 % (ref 12.3–15.4)
HCT VFR BLD AUTO: 29.1 % (ref 37.5–51)
HDLC SERPL-MCNC: 40 MG/DL (ref 40–60)
HGB BLD-MCNC: 10 G/DL (ref 13–17.7)
IMM GRANULOCYTES # BLD AUTO: 0.02 10*3/MM3 (ref 0–0.05)
IMM GRANULOCYTES NFR BLD AUTO: 0.3 % (ref 0–0.5)
LDLC SERPL CALC-MCNC: 82 MG/DL (ref 0–100)
LDLC/HDLC SERPL: 1.93 {RATIO}
LYMPHOCYTES # BLD AUTO: 1.64 10*3/MM3 (ref 0.7–3.1)
LYMPHOCYTES NFR BLD AUTO: 22.6 % (ref 19.6–45.3)
MCH RBC QN AUTO: 32.9 PG (ref 26.6–33)
MCHC RBC AUTO-ENTMCNC: 34.4 G/DL (ref 31.5–35.7)
MCV RBC AUTO: 95.7 FL (ref 79–97)
MONOCYTES # BLD AUTO: 0.48 10*3/MM3 (ref 0.1–0.9)
MONOCYTES NFR BLD AUTO: 6.6 % (ref 5–12)
NEUTROPHILS NFR BLD AUTO: 4.99 10*3/MM3 (ref 1.7–7)
NEUTROPHILS NFR BLD AUTO: 68.6 % (ref 42.7–76)
NRBC BLD AUTO-RTO: 0 /100 WBC (ref 0–0.2)
PLATELET # BLD AUTO: 205 10*3/MM3 (ref 140–450)
PMV BLD AUTO: 11 FL (ref 6–12)
RBC # BLD AUTO: 3.04 10*6/MM3 (ref 4.14–5.8)
TRIGL SERPL-MCNC: 169 MG/DL (ref 0–150)
VLDLC SERPL-MCNC: 29 MG/DL (ref 5–40)
WBC NRBC COR # BLD: 7.27 10*3/MM3 (ref 3.4–10.8)

## 2022-01-05 ENCOUNTER — CLINICAL SUPPORT (OUTPATIENT)
Dept: FAMILY MEDICINE CLINIC | Facility: CLINIC | Age: 87
End: 2022-01-05

## 2022-01-05 ENCOUNTER — TRANSCRIBE ORDERS (OUTPATIENT)
Dept: FAMILY MEDICINE CLINIC | Facility: CLINIC | Age: 87
End: 2022-01-05

## 2022-01-05 DIAGNOSIS — I10 HYPERTENSION, UNSPECIFIED TYPE: Primary | ICD-10-CM

## 2022-01-05 DIAGNOSIS — I10 HYPERTENSION, UNSPECIFIED TYPE: ICD-10-CM

## 2022-01-05 PROCEDURE — 80048 BASIC METABOLIC PNL TOTAL CA: CPT | Performed by: INTERNAL MEDICINE

## 2022-01-05 PROCEDURE — 36415 COLL VENOUS BLD VENIPUNCTURE: CPT | Performed by: NURSE PRACTITIONER

## 2022-01-05 NOTE — PROGRESS NOTES
Venipuncture Blood Specimen Collection  Venipuncture performed in Left Arm by Lauren Jiménez MA with good hemostasis. Patient tolerated the procedure well without complications.   01/05/22   Lauren Jiménez MA

## 2022-01-06 LAB
ANION GAP SERPL CALCULATED.3IONS-SCNC: 10 MMOL/L (ref 5–15)
BUN SERPL-MCNC: 32 MG/DL (ref 8–23)
BUN/CREAT SERPL: 19 (ref 7–25)
CALCIUM SPEC-SCNC: 9.5 MG/DL (ref 8.6–10.5)
CHLORIDE SERPL-SCNC: 105 MMOL/L (ref 98–107)
CO2 SERPL-SCNC: 26 MMOL/L (ref 22–29)
CREAT SERPL-MCNC: 1.68 MG/DL (ref 0.76–1.27)
GFR SERPL CREATININE-BSD FRML MDRD: 39 ML/MIN/1.73
GLUCOSE SERPL-MCNC: 85 MG/DL (ref 65–99)
POTASSIUM SERPL-SCNC: 4.5 MMOL/L (ref 3.5–5.2)
SODIUM SERPL-SCNC: 141 MMOL/L (ref 136–145)

## 2022-02-17 RX ORDER — PANTOPRAZOLE SODIUM 40 MG/1
TABLET, DELAYED RELEASE ORAL
Qty: 90 TABLET | Refills: 0 | Status: SHIPPED | OUTPATIENT
Start: 2022-02-17 | End: 2022-05-12

## 2022-03-02 NOTE — PROGRESS NOTES
"Subjective   Rubén Rivas is a 84 y.o. male.     Chief Complaint   Patient presents with   • Memory Loss       History of Present Illness     The following portions of the patient's history were reviewed and updated as appropriate: allergies, current medications, past family history, past medical history, past social history, past surgical history and problem list.    Review of Systems    Objective     /50 (BP Location: Left arm, Patient Position: Sitting, Cuff Size: Adult)   Pulse 68   Temp 97.2 °F (36.2 °C) (Oral)   Ht 177.8 cm (70\")   Wt 77.9 kg (171 lb 12.8 oz)   SpO2 98%   BMI 24.65 kg/m²     Physical Exam    Assessment/Plan     Problem List Items Addressed This Visit     None          {PHQ-9 Score:61923:::1}    Patient's Body mass index is 24.65 kg/m². BMI is {BMI range:92569}.   (Normal BMI:  18.5-24.9, OW 25-29.9, Obesity 30 or greater)    Rubén Rivas  reports that he quit smoking about 43 years ago. His smoking use included cigarettes. He has never used smokeless tobacco.. I have educated him on the risk of diseases from using tobacco products such as {Tobacco Cessation Diseases:57490::\"cancer\",\"COPD\",\"heart diease\"}.     I advised him to quit and he is {Willing/Not Willing to Quit Tobacco Products:69690}.    I spent {Time Spent Tobacco :02538} minutes counseling the patient.           Understands disease processes and need for medications.  Understands reasons for urgent and emergent care.  Patient (& family) verbalized agreement for treatment plan.   Emotional support and active listening provided.  Patient provided time to verbalize feelings.               This document has been electronically signed by ARNEL Amos   February 3, 2020 11:06 AM  " Not applicable

## 2022-03-03 DIAGNOSIS — G31.84 MILD COGNITIVE IMPAIRMENT WITH MEMORY LOSS: Chronic | ICD-10-CM

## 2022-03-03 RX ORDER — DONEPEZIL HYDROCHLORIDE 10 MG/1
TABLET, FILM COATED ORAL
Qty: 90 TABLET | Refills: 0 | Status: SHIPPED | OUTPATIENT
Start: 2022-03-03 | End: 2022-06-02 | Stop reason: SDUPTHER

## 2022-03-04 ENCOUNTER — CLINICAL SUPPORT (OUTPATIENT)
Dept: FAMILY MEDICINE CLINIC | Facility: CLINIC | Age: 87
End: 2022-03-04

## 2022-03-04 ENCOUNTER — TRANSCRIBE ORDERS (OUTPATIENT)
Dept: FAMILY MEDICINE CLINIC | Facility: CLINIC | Age: 87
End: 2022-03-04

## 2022-03-04 DIAGNOSIS — N18.31 CHRONIC KIDNEY DISEASE, STAGE 3A: ICD-10-CM

## 2022-03-04 DIAGNOSIS — N18.31 CHRONIC KIDNEY DISEASE, STAGE 3A: Primary | ICD-10-CM

## 2022-03-04 PROCEDURE — 80048 BASIC METABOLIC PNL TOTAL CA: CPT | Performed by: INTERNAL MEDICINE

## 2022-03-04 PROCEDURE — 36415 COLL VENOUS BLD VENIPUNCTURE: CPT | Performed by: FAMILY MEDICINE

## 2022-03-04 NOTE — PROGRESS NOTES
Venipuncture Blood Specimen Collection  Venipuncture performed in left arm by Lauren Jiménez MA with good hemostasis. Patient tolerated the procedure well without complications.   03/04/22   Lauren Jiménez MA

## 2022-03-05 LAB
ANION GAP SERPL CALCULATED.3IONS-SCNC: 10.7 MMOL/L (ref 5–15)
BUN SERPL-MCNC: 23 MG/DL (ref 8–23)
BUN/CREAT SERPL: 14.5 (ref 7–25)
CALCIUM SPEC-SCNC: 9.3 MG/DL (ref 8.6–10.5)
CHLORIDE SERPL-SCNC: 104 MMOL/L (ref 98–107)
CO2 SERPL-SCNC: 26.3 MMOL/L (ref 22–29)
CREAT SERPL-MCNC: 1.59 MG/DL (ref 0.76–1.27)
EGFRCR SERPLBLD CKD-EPI 2021: 42.3 ML/MIN/1.73
GLUCOSE SERPL-MCNC: 127 MG/DL (ref 65–99)
POTASSIUM SERPL-SCNC: 4.2 MMOL/L (ref 3.5–5.2)
SODIUM SERPL-SCNC: 141 MMOL/L (ref 136–145)

## 2022-03-17 RX ORDER — CLOPIDOGREL BISULFATE 75 MG/1
TABLET ORAL
Qty: 90 TABLET | Refills: 0 | Status: SHIPPED | OUTPATIENT
Start: 2022-03-17 | End: 2022-06-16

## 2022-03-28 ENCOUNTER — OFFICE VISIT (OUTPATIENT)
Dept: FAMILY MEDICINE CLINIC | Facility: CLINIC | Age: 87
End: 2022-03-28

## 2022-03-28 VITALS
SYSTOLIC BLOOD PRESSURE: 150 MMHG | BODY MASS INDEX: 24.51 KG/M2 | HEART RATE: 67 BPM | HEIGHT: 70 IN | DIASTOLIC BLOOD PRESSURE: 64 MMHG | WEIGHT: 171.2 LBS | TEMPERATURE: 97.3 F | OXYGEN SATURATION: 100 %

## 2022-03-28 DIAGNOSIS — I10 ESSENTIAL HYPERTENSION: Primary | Chronic | ICD-10-CM

## 2022-03-28 DIAGNOSIS — M19.90 CHRONIC OSTEOARTHRITIS: ICD-10-CM

## 2022-03-28 PROCEDURE — 99213 OFFICE O/P EST LOW 20 MIN: CPT | Performed by: FAMILY MEDICINE

## 2022-03-28 PROCEDURE — 96372 THER/PROPH/DIAG INJ SC/IM: CPT | Performed by: FAMILY MEDICINE

## 2022-03-28 PROCEDURE — 36415 COLL VENOUS BLD VENIPUNCTURE: CPT | Performed by: FAMILY MEDICINE

## 2022-03-28 RX ORDER — AMLODIPINE BESYLATE 5 MG/1
7.5 TABLET ORAL DAILY
Qty: 45 TABLET | Refills: 6 | Status: SHIPPED | OUTPATIENT
Start: 2022-03-28 | End: 2022-11-04 | Stop reason: SDUPTHER

## 2022-03-28 RX ORDER — METHYLPREDNISOLONE ACETATE 80 MG/ML
80 INJECTION, SUSPENSION INTRA-ARTICULAR; INTRALESIONAL; INTRAMUSCULAR; SOFT TISSUE ONCE
Status: COMPLETED | OUTPATIENT
Start: 2022-03-28 | End: 2022-03-28

## 2022-03-28 RX ADMIN — METHYLPREDNISOLONE ACETATE 80 MG: 80 INJECTION, SUSPENSION INTRA-ARTICULAR; INTRALESIONAL; INTRAMUSCULAR; SOFT TISSUE at 12:09

## 2022-03-28 NOTE — PROGRESS NOTES
Injection  Injection performed in Right arm by Lauren Jiménez MA. Patient tolerated the procedure well without complications.  03/28/22   Lauren Jiménez MA

## 2022-03-28 NOTE — PROGRESS NOTES
Subjective   Rubén Rivas is a 86 y.o. male.     History of Present Illness follow-up regarding hypertension dyslipidemia ANA.  Here with daughter today.  Has maintained follow-up with GI cardiology nephrology.  Available records reviewed.  Denies respiratory CDV GI except the episodic diarrhea  changes.  Feels like that depression is stable.  Feels that the injection of steroid is immensely helpful.  Home blood pressure readings have been staying a little higher.    The following portions of the patient's history were reviewed and updated as appropriate: allergies, current medications, past family history, past medical history, past social history, past surgical history and problem list.    Review of Systems  See history of Present Illness     Objective     Physical Exam  Vitals reviewed.   Constitutional:       Appearance: Normal appearance.   HENT:      Head: Normocephalic.   Eyes:      Conjunctiva/sclera: Conjunctivae normal.   Cardiovascular:      Rate and Rhythm: Normal rate and regular rhythm.      Heart sounds: Normal heart sounds.   Pulmonary:      Effort: Pulmonary effort is normal.      Breath sounds: Normal breath sounds.   Musculoskeletal:      Cervical back: Normal range of motion and neck supple.   Skin:     General: Skin is warm and dry.   Neurological:      Mental Status: He is alert and oriented to person, place, and time.   Psychiatric:         Mood and Affect: Mood normal.         PHQ-9 Total Score:      BMI is within normal parameters. No follow-up required.        Assessment/Plan     Diagnoses and all orders for this visit:    1. Essential hypertension (Primary)  -     amLODIPine (NORVASC) 5 MG tablet; Take 1.5 tablets by mouth Daily. as directed  Dispense: 45 tablet; Refill: 6    2. Chronic osteoarthritis  -     methylPREDNISolone acetate (DEPO-medrol) injection 80 mg    Will increase the amlodipine to 7.5 mg daily continuing all other medications as reconciled ordered.  Injection authorized.   That along with the fairly routine use of tramadol has made a difference for the chronic OA pain.  Keep follow-ups elsewhere as encouraged.  Maintain heart healthy diet.  Maintain pandemic response.  Would ask you to recheck in about 3 months or as needed.                     This document has been electronically signed by Chapin Sánchez MD   March 28, 2022 11:21 EDT    Part of this note may be an electronic transcription/translation of spoken language to printed text using the Dragon Dictation System.

## 2022-04-05 ENCOUNTER — TRANSCRIBE ORDERS (OUTPATIENT)
Dept: FAMILY MEDICINE CLINIC | Facility: CLINIC | Age: 87
End: 2022-04-05

## 2022-04-05 ENCOUNTER — CLINICAL SUPPORT (OUTPATIENT)
Dept: FAMILY MEDICINE CLINIC | Facility: CLINIC | Age: 87
End: 2022-04-05

## 2022-04-05 DIAGNOSIS — N40.0 BPH WITHOUT OBSTRUCTION/LOWER URINARY TRACT SYMPTOMS: ICD-10-CM

## 2022-04-05 DIAGNOSIS — C61 PROSTATE CANCER: ICD-10-CM

## 2022-04-05 DIAGNOSIS — N18.31 CHRONIC KIDNEY DISEASE (CKD) STAGE G3A/A1, MODERATELY DECREASED GLOMERULAR FILTRATION RATE (GFR) BETWEEN 45-59 ML/MIN/1.73 SQUARE METER AND ALBUMINURIA CREATININE RATIO LESS THAN 30 MG/G (CMS/H*: ICD-10-CM

## 2022-04-05 DIAGNOSIS — N18.31 CHRONIC KIDNEY DISEASE (CKD) STAGE G3A/A1, MODERATELY DECREASED GLOMERULAR FILTRATION RATE (GFR) BETWEEN 45-59 ML/MIN/1.73 SQUARE METER AND ALBUMINURIA CREATININE RATIO LESS THAN 30 MG/G (CMS/H*: Primary | ICD-10-CM

## 2022-04-05 PROCEDURE — 36415 COLL VENOUS BLD VENIPUNCTURE: CPT | Performed by: NURSE PRACTITIONER

## 2022-04-05 PROCEDURE — 82570 ASSAY OF URINE CREATININE: CPT | Performed by: INTERNAL MEDICINE

## 2022-04-05 PROCEDURE — 82043 UR ALBUMIN QUANTITATIVE: CPT | Performed by: INTERNAL MEDICINE

## 2022-04-05 PROCEDURE — 80048 BASIC METABOLIC PNL TOTAL CA: CPT | Performed by: INTERNAL MEDICINE

## 2022-04-05 NOTE — PROGRESS NOTES
Venipuncture Blood Specimen Collection  Venipuncture performed in left arm by Babs Stone MA with good hemostasis. Patient tolerated the procedure well without complications.   04/05/22   Babs Stone MA

## 2022-04-06 LAB
ALBUMIN UR-MCNC: 19 MG/DL
ANION GAP SERPL CALCULATED.3IONS-SCNC: 9 MMOL/L (ref 5–15)
BUN SERPL-MCNC: 42 MG/DL (ref 8–23)
BUN/CREAT SERPL: 26.6 (ref 7–25)
CALCIUM SPEC-SCNC: 9.4 MG/DL (ref 8.6–10.5)
CHLORIDE SERPL-SCNC: 106 MMOL/L (ref 98–107)
CO2 SERPL-SCNC: 27 MMOL/L (ref 22–29)
CREAT SERPL-MCNC: 1.58 MG/DL (ref 0.76–1.27)
CREAT UR-MCNC: 94.9 MG/DL
EGFRCR SERPLBLD CKD-EPI 2021: 42.6 ML/MIN/1.73
GLUCOSE SERPL-MCNC: 109 MG/DL (ref 65–99)
MICROALBUMIN/CREAT UR: 200.2 MG/G
POTASSIUM SERPL-SCNC: 4.1 MMOL/L (ref 3.5–5.2)
SODIUM SERPL-SCNC: 142 MMOL/L (ref 136–145)

## 2022-04-21 DIAGNOSIS — M19.90 CHRONIC OSTEOARTHRITIS: ICD-10-CM

## 2022-04-22 RX ORDER — TRAMADOL HYDROCHLORIDE 50 MG/1
TABLET ORAL
Qty: 90 TABLET | Refills: 0 | Status: SHIPPED | OUTPATIENT
Start: 2022-04-22 | End: 2022-06-08 | Stop reason: SDUPTHER

## 2022-05-06 DIAGNOSIS — F32.4 MAJOR DEPRESSIVE DISORDER WITH SINGLE EPISODE, IN PARTIAL REMISSION: ICD-10-CM

## 2022-05-06 RX ORDER — ESCITALOPRAM OXALATE 20 MG/1
TABLET ORAL
Qty: 90 TABLET | Refills: 0 | Status: SHIPPED | OUTPATIENT
Start: 2022-05-06 | End: 2022-08-08 | Stop reason: SDUPTHER

## 2022-05-12 DIAGNOSIS — N40.0 BPH WITHOUT OBSTRUCTION/LOWER URINARY TRACT SYMPTOMS: ICD-10-CM

## 2022-05-12 DIAGNOSIS — C61 PROSTATE CANCER: ICD-10-CM

## 2022-05-12 RX ORDER — PANTOPRAZOLE SODIUM 40 MG/1
TABLET, DELAYED RELEASE ORAL
Qty: 90 TABLET | Refills: 0 | Status: SHIPPED | OUTPATIENT
Start: 2022-05-12 | End: 2022-06-06

## 2022-05-12 RX ORDER — TAMSULOSIN HYDROCHLORIDE 0.4 MG/1
CAPSULE ORAL
Qty: 90 CAPSULE | Refills: 0 | Status: SHIPPED | OUTPATIENT
Start: 2022-05-12 | End: 2022-08-15 | Stop reason: SDUPTHER

## 2022-06-02 DIAGNOSIS — M19.90 CHRONIC OSTEOARTHRITIS: ICD-10-CM

## 2022-06-02 DIAGNOSIS — G31.84 MILD COGNITIVE IMPAIRMENT WITH MEMORY LOSS: Chronic | ICD-10-CM

## 2022-06-02 RX ORDER — TRAMADOL HYDROCHLORIDE 50 MG/1
TABLET ORAL
Qty: 90 TABLET | Refills: 0 | OUTPATIENT
Start: 2022-06-02

## 2022-06-02 RX ORDER — DONEPEZIL HYDROCHLORIDE 10 MG/1
TABLET, FILM COATED ORAL
Qty: 90 TABLET | Refills: 0 | Status: SHIPPED | OUTPATIENT
Start: 2022-06-02 | End: 2022-09-07 | Stop reason: SDUPTHER

## 2022-06-06 RX ORDER — PANTOPRAZOLE SODIUM 40 MG/1
TABLET, DELAYED RELEASE ORAL
Qty: 90 TABLET | Refills: 0 | Status: SHIPPED | OUTPATIENT
Start: 2022-06-06 | End: 2022-10-13 | Stop reason: ALTCHOICE

## 2022-06-07 ENCOUNTER — TELEPHONE (OUTPATIENT)
Dept: FAMILY MEDICINE CLINIC | Facility: CLINIC | Age: 87
End: 2022-06-07

## 2022-06-07 NOTE — TELEPHONE ENCOUNTER
Called patient back and scheduled appointment for tomorrow with Dr Donaldo figueroa to patient being out of medication.

## 2022-06-07 NOTE — TELEPHONE ENCOUNTER
Caller: Natalia Brown    Relationship to patient: Emergency Contact    Best call back number: [phone]    Patient is needing: NATALIA STATED THAT SHE HAS SOME PERSONAL ISSUES THAT SHE WOULD LIKE TO DISCUSS WITH NURSE ABOUT PATIENT    PLEASE ADVISE

## 2022-06-08 ENCOUNTER — OFFICE VISIT (OUTPATIENT)
Dept: FAMILY MEDICINE CLINIC | Facility: CLINIC | Age: 87
End: 2022-06-08

## 2022-06-08 VITALS
SYSTOLIC BLOOD PRESSURE: 127 MMHG | RESPIRATION RATE: 18 BRPM | OXYGEN SATURATION: 97 % | WEIGHT: 170 LBS | BODY MASS INDEX: 24.34 KG/M2 | DIASTOLIC BLOOD PRESSURE: 62 MMHG | HEART RATE: 69 BPM | HEIGHT: 70 IN | TEMPERATURE: 97.5 F

## 2022-06-08 DIAGNOSIS — M51.36 DDD (DEGENERATIVE DISC DISEASE), LUMBAR: Primary | Chronic | ICD-10-CM

## 2022-06-08 DIAGNOSIS — Z51.81 MEDICATION MONITORING ENCOUNTER: ICD-10-CM

## 2022-06-08 DIAGNOSIS — I10 ESSENTIAL HYPERTENSION: Chronic | ICD-10-CM

## 2022-06-08 DIAGNOSIS — M48.061 SPINAL STENOSIS OF LUMBAR REGION, UNSPECIFIED WHETHER NEUROGENIC CLAUDICATION PRESENT: Chronic | ICD-10-CM

## 2022-06-08 DIAGNOSIS — M19.90 CHRONIC OSTEOARTHRITIS: ICD-10-CM

## 2022-06-08 LAB
AMPHET+METHAMPHET UR QL: NEGATIVE
AMPHETAMINES UR QL: NEGATIVE
BARBITURATES UR QL SCN: NEGATIVE
BENZODIAZ UR QL SCN: NEGATIVE
BUPRENORPHINE SERPL-MCNC: NEGATIVE NG/ML
CANNABINOIDS SERPL QL: NEGATIVE
COCAINE UR QL: NEGATIVE
METHADONE UR QL SCN: NEGATIVE
OPIATES UR QL: NEGATIVE
OXYCODONE UR QL SCN: NEGATIVE
PCP UR QL SCN: NEGATIVE
PROPOXYPH UR QL: NEGATIVE
TRICYCLICS UR QL SCN: NEGATIVE

## 2022-06-08 PROCEDURE — 96372 THER/PROPH/DIAG INJ SC/IM: CPT | Performed by: INTERNAL MEDICINE

## 2022-06-08 PROCEDURE — 80306 DRUG TEST PRSMV INSTRMNT: CPT | Performed by: INTERNAL MEDICINE

## 2022-06-08 PROCEDURE — 99214 OFFICE O/P EST MOD 30 MIN: CPT | Performed by: INTERNAL MEDICINE

## 2022-06-08 RX ORDER — METHYLPREDNISOLONE SODIUM SUCCINATE 40 MG/ML
80 INJECTION, POWDER, LYOPHILIZED, FOR SOLUTION INTRAMUSCULAR; INTRAVENOUS ONCE
Status: DISCONTINUED | OUTPATIENT
Start: 2022-06-08 | End: 2022-12-15

## 2022-06-08 RX ORDER — METHYLPREDNISOLONE SODIUM SUCCINATE 125 MG/2ML
125 INJECTION, POWDER, LYOPHILIZED, FOR SOLUTION INTRAMUSCULAR; INTRAVENOUS EVERY 6 HOURS
Status: DISCONTINUED | OUTPATIENT
Start: 2022-06-08 | End: 2022-06-08

## 2022-06-08 RX ORDER — METHYLPREDNISOLONE SODIUM SUCCINATE 40 MG/ML
80 INJECTION, POWDER, LYOPHILIZED, FOR SOLUTION INTRAMUSCULAR; INTRAVENOUS ONCE
Status: DISCONTINUED | OUTPATIENT
Start: 2022-06-08 | End: 2022-06-08

## 2022-06-08 RX ORDER — TRAMADOL HYDROCHLORIDE 50 MG/1
50 TABLET ORAL EVERY 8 HOURS PRN
Qty: 90 TABLET | Refills: 2 | Status: SHIPPED | OUTPATIENT
Start: 2022-06-08 | End: 2022-09-12 | Stop reason: SDUPTHER

## 2022-06-08 RX ORDER — METHYLPREDNISOLONE SODIUM SUCCINATE 125 MG/2ML
80 INJECTION, POWDER, LYOPHILIZED, FOR SOLUTION INTRAMUSCULAR; INTRAVENOUS ONCE
Status: COMPLETED | OUTPATIENT
Start: 2022-06-08 | End: 2022-06-08

## 2022-06-08 RX ADMIN — METHYLPREDNISOLONE SODIUM SUCCINATE 80 MG: 125 INJECTION, POWDER, LYOPHILIZED, FOR SOLUTION INTRAMUSCULAR; INTRAVENOUS at 16:49

## 2022-06-08 NOTE — PROGRESS NOTES
Patient Name: Rubén Rivas Today's Date: 2022   Patient MRN / CSN: [id number] / [id number] Date of Encounter: 2022   Patient Age / : 86 y.o. / 1935 Encounter Provider: Laura Fulton DO   Referring Physician: No ref. provider found          Rubén is a 86 y.o. male who is being seen today for Establish Care      Mr. Rivas presents today to transfer care from Dr. Sánchez to myself.  He reports overall doing well, aside from his lumbar back pain.    Back Pain  This is a chronic problem. The current episode started more than 1 year ago. The pain is present in the lumbar spine. The pain radiates to the left thigh and right thigh. The pain is moderate. Associated symptoms include leg pain. Pertinent negatives include no abdominal pain, chest pain or fever. He has tried analgesics (Patient reports steroid injections help.  He typically gets this about every 3 months and received a Depo-Medrol injection in 2022.  He reports doing well with tramadol.  He reports this keeps him active and functional and he tolerates this well) for the symptoms. The treatment provided significant relief.   Hypertension  This is a chronic problem. The current episode started more than 1 year ago. The problem is controlled (Blood pressure well controlled since Norvasc was increased at patient's last visit). Pertinent negatives include no chest pain or shortness of breath. Current antihypertension treatment includes calcium channel blockers, angiotensin blockers and beta blockers. The current treatment provides significant improvement. There are no compliance problems.        Allergies include:Sulfa antibiotics  Current Outpatient Medications   Medication Sig Dispense Refill   • amLODIPine (NORVASC) 5 MG tablet Take 1.5 tablets by mouth Daily. as directed 45 tablet 6   • aspirin 81 MG tablet Take  by mouth daily.     • Budesonide (ENTOCORT EC) 3 MG 24 hr capsule Take 6 mg by mouth Daily.     • clopidogrel (PLAVIX) 75 MG  tablet Take 1 tablet by mouth once daily 90 tablet 0   • colesevelam (WELCHOL) 625 MG tablet Take 625 mg by mouth 2 (Two) Times a Day.     • diphenhydrAMINE-acetaminophen (TYLENOL PM)  MG tablet per tablet Take 1 tablet by mouth At Night As Needed for sleep.     • diphenoxylate-atropine (Lomotil) 2.5-0.025 MG per tablet Take 1 tablet by mouth 4 (Four) Times a Day As Needed for Diarrhea. 20 tablet 0   • donepezil (ARICEPT) 10 MG tablet TAKE 1 TABLET BY MOUTH AT NIGHT 90 tablet 0   • escitalopram (LEXAPRO) 20 MG tablet Take 1 tablet by mouth once daily 90 tablet 0   • FIBER PO Take  by mouth Daily.     • Glucose Blood (ONETOUCH ULTRA BLUE VI) daily.     • lactase (Lactaid) 3000 units tablet Take 1 tablet by mouth 3 (Three) Times a Day With Meals. 90 tablet 0   • losartan (COZAAR) 25 MG tablet      • metoprolol tartrate (LOPRESSOR) 25 MG tablet Take 1 tablet by mouth 2 (Two) Times a Day. 180 tablet 1   • Multiple Vitamins-Minerals (ONE-A-DAY MENS 50+ ADVANTAGE PO) Take  by mouth daily.     • mupirocin (BACTROBAN) 2 % ointment Apply  topically to the appropriate area as directed 3 (Three) Times a Day. 15 g 1   • pantoprazole (PROTONIX) 40 MG EC tablet Take 1 tablet by mouth once daily 90 tablet 0   • rosuvastatin (CRESTOR) 20 MG tablet Take 1 tablet by mouth Daily. 90 tablet 3   • tamsulosin (FLOMAX) 0.4 MG capsule 24 hr capsule Take 1 capsule by mouth once daily 90 capsule 0   • traMADol (ULTRAM) 50 MG tablet Take 1 tablet by mouth Every 8 (Eight) Hours As Needed for Moderate Pain . 90 tablet 2     Current Facility-Administered Medications   Medication Dose Route Frequency Provider Last Rate Last Admin   • methylPREDNISolone sodium succinate (SOLU-Medrol) injection 80 mg  80 mg Intramuscular Once Laura Fulton DO         Past Medical History:   Diagnosis Date   • Anemia    • Arthritis    • Depression    • Hyperlipidemia    • Hypertension    • Prostate cancer (HCC) 2013   • Stage 3 chronic kidney disease (HCC)  "2021     Family History   Problem Relation Age of Onset   • Cancer Mother 35        breast   • Heart attack Father    • Cancer Sister         cervical   • Heart attack Brother    • Heart disease Brother    • Cancer Brother         Prostate     Past Surgical History:   Procedure Laterality Date   • CARDIAC SURGERY     • COLONOSCOPY     • HERNIA REPAIR     • LAPAROSCOPIC CHOLECYSTECTOMY  2020    PERFORMED AT [facility]   • STOMACH SURGERY       Social History     Substance and Sexual Activity   Alcohol Use No     Social History     Tobacco Use   Smoking Status Former Smoker   • Packs/day: 0.25   • Years: 15.00   • Pack years: 3.75   • Types: Cigarettes   • Quit date: 1976   • Years since quittin.9   Smokeless Tobacco Never Used     Social History     Substance and Sexual Activity   Drug Use No     Review of Systems   Constitutional: Negative for fever.   Respiratory: Negative for shortness of breath.    Cardiovascular: Negative for chest pain.   Gastrointestinal: Negative for abdominal pain.   Musculoskeletal: Positive for arthralgias and back pain.        Depression Assessment Review:  PHQ-9 Total Score: 0  Vital Signs & Measurements Taken This Encounter  /62 (BP Location: Left arm, Patient Position: Sitting, Cuff Size: Adult)   Pulse 69   Temp 97.5 °F (36.4 °C) (Temporal)   Resp 18   Ht 177.8 cm (70\")   Wt 77.1 kg (170 lb)   SpO2 97%   BMI 24.39 kg/m²    SpO2 Percentage    22 1345   SpO2: 97%        BMI is within normal parameters. No other follow-up for BMI required.      Physical Exam  Vitals reviewed.   Constitutional:       General: He is not in acute distress.  HENT:      Head: Normocephalic and atraumatic.   Eyes:      General: No scleral icterus.     Extraocular Movements: Extraocular movements intact.      Conjunctiva/sclera: Conjunctivae normal.      Pupils: Pupils are equal, round, and reactive to light.   Cardiovascular:      Rate and Rhythm: Normal rate and " regular rhythm.   Pulmonary:      Effort: Pulmonary effort is normal. No respiratory distress.      Breath sounds: Normal breath sounds. No wheezing or rhonchi.   Musculoskeletal:         General: No swelling.   Skin:     General: Skin is warm and dry.      Coloration: Skin is not jaundiced.   Neurological:      Mental Status: He is alert.   Psychiatric:         Mood and Affect: Mood normal.         Behavior: Behavior normal.              Assessment & Plan  Patient Active Problem List   Diagnosis   • Anemia   • Chronic coronary artery disease   • Chronic osteoarthritis   • Depression   • Elevated prostate specific antigen (PSA)   • Enlarged prostate   • Hyperlipidemia   • Essential hypertension   • Iron deficiency   • Low back pain   • Malignant neoplasm of prostate (HCC)   • Prostate cancer (HCC)   • Mild cognitive impairment with memory loss   • Stage 3 chronic kidney disease (HCC)   • Spinal stenosis of lumbar region   • DDD (degenerative disc disease), lumbar   • Diverticulosis       ICD-10-CM ICD-9-CM   1. DDD (degenerative disc disease), lumbar  M51.36 722.52   2. Spinal stenosis of lumbar region, unspecified whether neurogenic claudication present  M48.061 724.02   3. Chronic osteoarthritis  M19.90 715.90   4. Essential hypertension  I10 401.9   5. Medication monitoring encounter  Z51.81 V58.83     Orders Placed This Encounter   Procedures   • Urine Drug Screen - Urine, Clean Catch     Order Specific Question:   Release to patient     Answer:   Immediate       Meds Ordered During Visit:  New Medications Ordered This Visit   Medications   • traMADol (ULTRAM) 50 MG tablet     Sig: Take 1 tablet by mouth Every 8 (Eight) Hours As Needed for Moderate Pain .     Dispense:  90 tablet     Refill:  2   • methylPREDNISolone sodium succinate (SOLU-Medrol) injection 80 mg       We will continue current medicine regimen.  PDMP reviewed today.  Controlled substance contract and UDS updated today.  We will give Solu-Medrol  injection today as above.  I encouraged patient to continue his nephrology follow-up as planned soon.  He is planning to have his labs done just prior to that appointment.    Return in about 3 months (around 9/8/2022), or if symptoms worsen or fail to improve, for Medicare Wellness.          Referring Provider (if known): No ref. provider found      This document has been electronically signed by Laura Fulton DO  June 8, 2022 15:02 EDT    Laura Fulton DO, FACOI  [address] [place] [address]  [phone] (office)    Part of this note may be an electronic transcription/translation of spoken language to printed text using the Dragon Dictation System.

## 2022-06-08 NOTE — PROGRESS NOTES
Injection  Injection performed in left dorsogluteal IM by Babs Stone MA. Patient tolerated the procedure well without complications.  06/08/22   Babs Stone MA

## 2022-06-16 RX ORDER — CLOPIDOGREL BISULFATE 75 MG/1
TABLET ORAL
Qty: 90 TABLET | Refills: 0 | Status: SHIPPED | OUTPATIENT
Start: 2022-06-16 | End: 2022-06-20 | Stop reason: SDUPTHER

## 2022-06-20 RX ORDER — CLOPIDOGREL BISULFATE 75 MG/1
75 TABLET ORAL DAILY
Qty: 90 TABLET | Refills: 0 | Status: SHIPPED | OUTPATIENT
Start: 2022-06-20 | End: 2022-12-14

## 2022-06-20 NOTE — TELEPHONE ENCOUNTER
Caller: Aleksandra Brown    Relationship: Emergency Contact    Best call back number: [phone]    Requested Prescriptions:   Requested Prescriptions     Pending Prescriptions Disp Refills   • clopidogrel (PLAVIX) 75 MG tablet 90 tablet 0     Sig: Take 1 tablet by mouth Daily.        Pharmacy where request should be sent: [facility] [address] - [phone]  - [phone]     Additional details provided by patient: PATIENT ONLY HAS 1 DAY REMAINING AND HE IS USING A NEW PHARMACY LISTED ABOVE. PLEASE ADVISE     Does the patient have less than a 3 day supply:  [x] Yes  [] No    Manuel Hayse Rep   06/20/22 12:19 EDT

## 2022-06-21 ENCOUNTER — TELEPHONE (OUTPATIENT)
Dept: FAMILY MEDICINE CLINIC | Facility: CLINIC | Age: 87
End: 2022-06-21

## 2022-06-21 NOTE — TELEPHONE ENCOUNTER
Patient came with his wife to office visit and was requesting to see if we could get Plavix sent to [place] due to cost. After checking chart notified patient that medication had been called in yesterday per Dr Fulton. Patient verbalized understanding.

## 2022-07-18 ENCOUNTER — TRANSCRIBE ORDERS (OUTPATIENT)
Dept: FAMILY MEDICINE CLINIC | Facility: CLINIC | Age: 87
End: 2022-07-18

## 2022-07-18 ENCOUNTER — CLINICAL SUPPORT (OUTPATIENT)
Dept: FAMILY MEDICINE CLINIC | Facility: CLINIC | Age: 87
End: 2022-07-18

## 2022-07-18 DIAGNOSIS — N18.31 CKD STAGE G3A/A3, GFR 45-59 AND ALBUMIN CREATININE RATIO >300 MG/G: Primary | ICD-10-CM

## 2022-07-18 DIAGNOSIS — N18.31 CKD STAGE G3A/A3, GFR 45-59 AND ALBUMIN CREATININE RATIO >300 MG/G: ICD-10-CM

## 2022-07-18 PROCEDURE — 84156 ASSAY OF PROTEIN URINE: CPT | Performed by: INTERNAL MEDICINE

## 2022-07-18 PROCEDURE — 36415 COLL VENOUS BLD VENIPUNCTURE: CPT | Performed by: INTERNAL MEDICINE

## 2022-07-18 PROCEDURE — 80048 BASIC METABOLIC PNL TOTAL CA: CPT | Performed by: INTERNAL MEDICINE

## 2022-07-18 PROCEDURE — 82570 ASSAY OF URINE CREATININE: CPT | Performed by: INTERNAL MEDICINE

## 2022-07-18 PROCEDURE — 82043 UR ALBUMIN QUANTITATIVE: CPT | Performed by: INTERNAL MEDICINE

## 2022-07-18 NOTE — PROGRESS NOTES
Venipuncture Blood Specimen Collection  Venipuncture performed in LEFT ARM by Lauren Jiménez MA with good hemostasis. Patient tolerated the procedure well without complications.   07/18/22   Lauren Jiménez MA

## 2022-07-19 LAB
ALBUMIN UR-MCNC: 17.3 MG/DL
ANION GAP SERPL CALCULATED.3IONS-SCNC: 10 MMOL/L (ref 5–15)
BUN SERPL-MCNC: 29 MG/DL (ref 8–23)
BUN/CREAT SERPL: 16.2 (ref 7–25)
CALCIUM SPEC-SCNC: 9.2 MG/DL (ref 8.6–10.5)
CHLORIDE SERPL-SCNC: 103 MMOL/L (ref 98–107)
CO2 SERPL-SCNC: 25 MMOL/L (ref 22–29)
CREAT SERPL-MCNC: 1.79 MG/DL (ref 0.76–1.27)
CREAT UR-MCNC: 237.5 MG/DL
CREAT UR-MCNC: 237.5 MG/DL
EGFRCR SERPLBLD CKD-EPI 2021: 36.7 ML/MIN/1.73
GLUCOSE SERPL-MCNC: 96 MG/DL (ref 65–99)
MICROALBUMIN/CREAT UR: 72.8 MG/G
POTASSIUM SERPL-SCNC: 5.1 MMOL/L (ref 3.5–5.2)
PROT ?TM UR-MCNC: 37.3 MG/DL
PROT/CREAT UR: 157.1 MG/G CREA (ref 0–200)
SODIUM SERPL-SCNC: 138 MMOL/L (ref 136–145)

## 2022-08-08 ENCOUNTER — TELEPHONE (OUTPATIENT)
Dept: FAMILY MEDICINE CLINIC | Facility: CLINIC | Age: 87
End: 2022-08-08

## 2022-08-08 DIAGNOSIS — F32.4 MAJOR DEPRESSIVE DISORDER WITH SINGLE EPISODE, IN PARTIAL REMISSION: ICD-10-CM

## 2022-08-08 RX ORDER — ESCITALOPRAM OXALATE 20 MG/1
20 TABLET ORAL DAILY
Qty: 90 TABLET | Refills: 1 | Status: SHIPPED | OUTPATIENT
Start: 2022-08-08 | End: 2023-02-06

## 2022-08-08 NOTE — TELEPHONE ENCOUNTER
Caller: Natalia Brown    Relationship: Emergency Contact    Best call back number: [phone]    What is the best time to reach you: ANY    Who are you requesting to speak with (clinical staff, provider,  specific staff member): DR. ARTEAGA NURSE DAVID    Do you know the name of the person who called: NATALIA    What was the call regarding: MEDICATION QUESTIONS    Do you require a callback: YES

## 2022-08-08 NOTE — TELEPHONE ENCOUNTER
Returned call to pt daughter Aleksandra. Pt needs this refilled, he has a 2-3 doses left. Stated that this should be sent to [facility].

## 2022-08-15 ENCOUNTER — TELEPHONE (OUTPATIENT)
Dept: FAMILY MEDICINE CLINIC | Facility: CLINIC | Age: 87
End: 2022-08-15

## 2022-08-15 DIAGNOSIS — N40.0 BPH WITHOUT OBSTRUCTION/LOWER URINARY TRACT SYMPTOMS: ICD-10-CM

## 2022-08-15 DIAGNOSIS — C61 PROSTATE CANCER: ICD-10-CM

## 2022-08-15 RX ORDER — TAMSULOSIN HYDROCHLORIDE 0.4 MG/1
1 CAPSULE ORAL DAILY
Qty: 90 CAPSULE | Refills: 1 | Status: SHIPPED | OUTPATIENT
Start: 2022-08-15 | End: 2023-02-09

## 2022-08-15 NOTE — TELEPHONE ENCOUNTER
Caller: Aleksandra Brown    Relationship: Emergency Contact    Best call back number: [phone]    What is the best time to reach you: ASAP    Who are you requesting to speak with (clinical staff, provider,  specific staff member): DAVID (NURSE FOR DR ARTEAGA)    Do you require a callback: YES

## 2022-08-22 NOTE — TELEPHONE ENCOUNTER
Caller: Aleksandra Brown    Relationship: Emergency Contact    Best call back number: [phone]    What is the best time to reach you: ANYTIME    Who are you requesting to speak with (clinical staff, provider,  specific staff member): LINDA      What was the call regarding: PT DAUGHTER RATHER NOT DISCUSS REASON.

## 2022-08-22 NOTE — TELEPHONE ENCOUNTER
Called Aleksandra back, needed labs for another provider. Placed on nurse/ma schedule for tomorrow.

## 2022-08-23 ENCOUNTER — CLINICAL SUPPORT (OUTPATIENT)
Dept: FAMILY MEDICINE CLINIC | Facility: CLINIC | Age: 87
End: 2022-08-23

## 2022-08-23 DIAGNOSIS — N40.0 ENLARGED PROSTATE: ICD-10-CM

## 2022-08-23 DIAGNOSIS — R97.20 ELEVATED PROSTATE SPECIFIC ANTIGEN (PSA): ICD-10-CM

## 2022-08-23 PROCEDURE — 84153 ASSAY OF PSA TOTAL: CPT | Performed by: NURSE PRACTITIONER

## 2022-08-23 PROCEDURE — 36415 COLL VENOUS BLD VENIPUNCTURE: CPT | Performed by: INTERNAL MEDICINE

## 2022-08-23 NOTE — PROGRESS NOTES
Venipuncture Blood Specimen Collection  Venipuncture performed in LEFT ARM by Esperanza Loya RN with good hemostasis. Patient tolerated the procedure well without complications.   08/23/22   Esperanza Loya RN

## 2022-08-24 LAB — PSA SERPL-MCNC: <0.014 NG/ML (ref 0–4)

## 2022-09-07 DIAGNOSIS — G31.84 MILD COGNITIVE IMPAIRMENT WITH MEMORY LOSS: Chronic | ICD-10-CM

## 2022-09-07 RX ORDER — DONEPEZIL HYDROCHLORIDE 10 MG/1
10 TABLET, FILM COATED ORAL
Qty: 90 TABLET | Refills: 1 | Status: SHIPPED | OUTPATIENT
Start: 2022-09-07 | End: 2022-09-12 | Stop reason: DRUGHIGH

## 2022-09-07 NOTE — TELEPHONE ENCOUNTER
Caller: Aleksandra Brown    Relationship: Emergency Contact    Best call back number: [phone]    What is the best time to reach you: ANY TIME     Who are you requesting to speak with (clinical staff, provider,  specific staff member): JEMAL SPRING OR HER NURSE     Do you know the name of the person who called:     What was the call regarding: REGARDING A MEDICATION - DID NOT WANT TO PROVIDE INFORMATION     Do you require a callback: YES

## 2022-09-12 ENCOUNTER — OFFICE VISIT (OUTPATIENT)
Dept: FAMILY MEDICINE CLINIC | Facility: CLINIC | Age: 87
End: 2022-09-12

## 2022-09-12 VITALS
HEIGHT: 70 IN | SYSTOLIC BLOOD PRESSURE: 106 MMHG | DIASTOLIC BLOOD PRESSURE: 58 MMHG | HEART RATE: 62 BPM | BODY MASS INDEX: 24.34 KG/M2 | WEIGHT: 170 LBS | TEMPERATURE: 97.1 F | OXYGEN SATURATION: 99 %

## 2022-09-12 DIAGNOSIS — M48.062 SPINAL STENOSIS OF LUMBAR REGION WITH NEUROGENIC CLAUDICATION: Chronic | ICD-10-CM

## 2022-09-12 DIAGNOSIS — M51.36 DDD (DEGENERATIVE DISC DISEASE), LUMBAR: Chronic | ICD-10-CM

## 2022-09-12 DIAGNOSIS — Z00.00 HEALTH CARE MAINTENANCE: ICD-10-CM

## 2022-09-12 DIAGNOSIS — C61 PROSTATE CANCER: ICD-10-CM

## 2022-09-12 DIAGNOSIS — D50.0 IRON DEFICIENCY ANEMIA DUE TO CHRONIC BLOOD LOSS: ICD-10-CM

## 2022-09-12 DIAGNOSIS — Z00.00 ENCOUNTER FOR WELLNESS EXAMINATION: Primary | ICD-10-CM

## 2022-09-12 DIAGNOSIS — M19.90 CHRONIC OSTEOARTHRITIS: ICD-10-CM

## 2022-09-12 DIAGNOSIS — I10 ESSENTIAL HYPERTENSION: ICD-10-CM

## 2022-09-12 DIAGNOSIS — N18.32 STAGE 3B CHRONIC KIDNEY DISEASE: Chronic | ICD-10-CM

## 2022-09-12 DIAGNOSIS — G31.84 MILD COGNITIVE IMPAIRMENT WITH MEMORY LOSS: ICD-10-CM

## 2022-09-12 PROCEDURE — 83540 ASSAY OF IRON: CPT | Performed by: INTERNAL MEDICINE

## 2022-09-12 PROCEDURE — 85027 COMPLETE CBC AUTOMATED: CPT | Performed by: INTERNAL MEDICINE

## 2022-09-12 PROCEDURE — 1159F MED LIST DOCD IN RCRD: CPT | Performed by: INTERNAL MEDICINE

## 2022-09-12 PROCEDURE — 84443 ASSAY THYROID STIM HORMONE: CPT | Performed by: INTERNAL MEDICINE

## 2022-09-12 PROCEDURE — 1170F FXNL STATUS ASSESSED: CPT | Performed by: INTERNAL MEDICINE

## 2022-09-12 PROCEDURE — 36415 COLL VENOUS BLD VENIPUNCTURE: CPT | Performed by: INTERNAL MEDICINE

## 2022-09-12 PROCEDURE — 81001 URINALYSIS AUTO W/SCOPE: CPT | Performed by: INTERNAL MEDICINE

## 2022-09-12 PROCEDURE — 80061 LIPID PANEL: CPT | Performed by: INTERNAL MEDICINE

## 2022-09-12 PROCEDURE — 96372 THER/PROPH/DIAG INJ SC/IM: CPT | Performed by: INTERNAL MEDICINE

## 2022-09-12 PROCEDURE — G0439 PPPS, SUBSEQ VISIT: HCPCS | Performed by: INTERNAL MEDICINE

## 2022-09-12 PROCEDURE — 99214 OFFICE O/P EST MOD 30 MIN: CPT | Performed by: INTERNAL MEDICINE

## 2022-09-12 PROCEDURE — 84466 ASSAY OF TRANSFERRIN: CPT | Performed by: INTERNAL MEDICINE

## 2022-09-12 PROCEDURE — 84439 ASSAY OF FREE THYROXINE: CPT | Performed by: INTERNAL MEDICINE

## 2022-09-12 PROCEDURE — 80053 COMPREHEN METABOLIC PANEL: CPT | Performed by: INTERNAL MEDICINE

## 2022-09-12 PROCEDURE — 82607 VITAMIN B-12: CPT | Performed by: INTERNAL MEDICINE

## 2022-09-12 RX ORDER — LANOLIN ALCOHOL/MO/W.PET/CERES
1000 CREAM (GRAM) TOPICAL DAILY
COMMUNITY

## 2022-09-12 RX ORDER — TRAMADOL HYDROCHLORIDE 50 MG/1
50 TABLET ORAL EVERY 6 HOURS PRN
Qty: 120 TABLET | Refills: 2 | Status: SHIPPED | OUTPATIENT
Start: 2022-09-12 | End: 2022-10-13 | Stop reason: ALTCHOICE

## 2022-09-12 RX ORDER — TRIAMCINOLONE ACETONIDE 40 MG/ML
60 INJECTION, SUSPENSION INTRA-ARTICULAR; INTRAMUSCULAR ONCE
Status: COMPLETED | OUTPATIENT
Start: 2022-09-12 | End: 2022-09-12

## 2022-09-12 RX ORDER — DONEPEZIL HYDROCHLORIDE 23 MG/1
23 TABLET, FILM COATED ORAL NIGHTLY
Qty: 90 TABLET | Refills: 1 | Status: SHIPPED | OUTPATIENT
Start: 2022-09-12 | End: 2023-02-02 | Stop reason: DRUGHIGH

## 2022-09-12 RX ADMIN — TRIAMCINOLONE ACETONIDE 60 MG: 40 INJECTION, SUSPENSION INTRA-ARTICULAR; INTRAMUSCULAR at 15:24

## 2022-09-12 NOTE — PROGRESS NOTES
[facility] of the Annual Wellness Visit  Subsequent Medicare Wellness Visit    Chief Complaint   Patient presents with   • Medicare Wellness-subsequent      Subjective    History of Present Illness:  Rubén Rivas is a 86 y.o. male who presents for a Subsequent Medicare Wellness Visit.    The following portions of the patient's history were reviewed and   updated as appropriate: allergies, current medications, past family history, past medical history, past social history, past surgical history and problem list.    Compared to one year ago, the patient feels his physical   health is worse.    Compared to one year ago, the patient feels his mental   health is worse.    Recent Hospitalizations:  He was not admitted to the hospital during the last year.       Current Medical Providers:  Patient Care Team:  Laura Fulton DO as PCP - General (Family Medicine)  Ze Carey MD as Consulting Physician (Urology)    Outpatient Medications Prior to Visit   Medication Sig Dispense Refill   • amLODIPine (NORVASC) 5 MG tablet Take 1.5 tablets by mouth Daily. as directed 45 tablet 6   • aspirin 81 MG tablet Take  by mouth daily.     • Budesonide (ENTOCORT EC) 3 MG 24 hr capsule Take 6 mg by mouth Daily.     • clopidogrel (PLAVIX) 75 MG tablet Take 1 tablet by mouth Daily. 90 tablet 0   • diphenhydrAMINE-acetaminophen (TYLENOL PM)  MG tablet per tablet Take 1 tablet by mouth At Night As Needed for sleep.     • diphenoxylate-atropine (Lomotil) 2.5-0.025 MG per tablet Take 1 tablet by mouth 4 (Four) Times a Day As Needed for Diarrhea. 20 tablet 0   • escitalopram (LEXAPRO) 20 MG tablet Take 1 tablet by mouth Daily. 90 tablet 1   • Glucose Blood (ONETOUCH ULTRA BLUE VI) daily.     • losartan (COZAAR) 25 MG tablet      • metoprolol tartrate (LOPRESSOR) 25 MG tablet Take 1 tablet by mouth 2 (Two) Times a Day. 180 tablet 1   • Multiple Vitamins-Minerals (ONE-A-DAY MENS 50+ ADVANTAGE PO) Take  by mouth daily.     •  mupirocin (BACTROBAN) 2 % ointment Apply  topically to the appropriate area as directed 3 (Three) Times a Day. 15 g 1   • pantoprazole (PROTONIX) 40 MG EC tablet Take 1 tablet by mouth once daily 90 tablet 0   • rosuvastatin (CRESTOR) 20 MG tablet Take 1 tablet by mouth Daily. 90 tablet 3   • tamsulosin (FLOMAX) 0.4 MG capsule 24 hr capsule Take 1 capsule by mouth Daily. 90 capsule 1   • vitamin B-12 (CYANOCOBALAMIN) 1000 MCG tablet Take 1,000 mcg by mouth Daily.     • donepezil (ARICEPT) 10 MG tablet Take 1 tablet by mouth every night at bedtime. 90 tablet 1   • traMADol (ULTRAM) 50 MG tablet Take 1 tablet by mouth Every 8 (Eight) Hours As Needed for Moderate Pain . 90 tablet 2   • colesevelam (WELCHOL) 625 MG tablet Take 625 mg by mouth 2 (Two) Times a Day.     • FIBER PO Take  by mouth Daily.     • lactase (Lactaid) 3000 units tablet Take 1 tablet by mouth 3 (Three) Times a Day With Meals. 90 tablet 0     Facility-Administered Medications Prior to Visit   Medication Dose Route Frequency Provider Last Rate Last Admin   • methylPREDNISolone sodium succinate (SOLU-Medrol) injection 80 mg  80 mg Intramuscular Once Laura Fulton DO           Opioid medication/s are on active medication list.  and I have evaluated his active treatment plan and pain score trends (see table).  Vitals:    09/12/22 1402   PainSc:   6     I have reviewed the chart for potential of high risk medication and harmful drug interactions in the elderly.            Aspirin is on active medication list. Aspirin use is indicated based on review of current medical condition/s. Pros and cons of this therapy have been discussed today. Benefits of this medication outweigh potential harm.  Patient has been encouraged to continue taking this medication.  .      Patient Active Problem List   Diagnosis   • Anemia   • Chronic coronary artery disease   • Chronic osteoarthritis   • Depression   • Elevated prostate specific antigen (PSA)   • Enlarged  "prostate   • Hyperlipidemia   • Essential hypertension   • Iron deficiency   • Low back pain   • Malignant neoplasm of prostate (HCC)   • Prostate cancer (HCC)   • Mild cognitive impairment with memory loss   • Stage 3 chronic kidney disease (HCC)   • Spinal stenosis of lumbar region   • DDD (degenerative disc disease), lumbar   • Diverticulosis     Advance Care Planning  Advance Directive is on file.  ACP discussion was held with the patient during this visit. Patient has an advance directive in EMR which is still valid.     Review of Systems   Constitutional: Negative for fever.   Respiratory: Negative for shortness of breath.    Cardiovascular: Negative for chest pain.   Gastrointestinal: Negative for abdominal pain.   Genitourinary: Negative for dysuria and hematuria.   Musculoskeletal: Positive for back pain.        Lumbar pain with radiation to right LE. Patient requests a steroid injection for this pain today. He reports this helps him a lot. Tramadol helps some but is not controlling his pain as well recently. He reports tolerating it well.    Neurological: Positive for confusion.   Psychiatric/Behavioral:        Patient's wife reports that patient's confusion is worsening. She notes his long term memory intact but short term memory much worse despite aricept. She would like him to try a higher dose.         Objective    Vitals:    09/12/22 1402   BP: 106/58   BP Location: Left arm   Patient Position: Sitting   Cuff Size: Adult   Pulse: 62   Temp: 97.1 °F (36.2 °C)   TempSrc: Temporal   SpO2: 99%   Weight: 77.1 kg (170 lb)   Height: 177.8 cm (70\")   PainSc:   6     Estimated body mass index is 24.39 kg/m² as calculated from the following:    Height as of this encounter: 177.8 cm (70\").    Weight as of this encounter: 77.1 kg (170 lb).    BMI is within normal parameters. No other follow-up for BMI required.      Does the patient have evidence of cognitive impairment? Yes    Physical Exam  Vitals reviewed. "   Constitutional:       General: He is not in acute distress.  HENT:      Head: Normocephalic and atraumatic.   Eyes:      General: No scleral icterus.     Extraocular Movements: Extraocular movements intact.      Conjunctiva/sclera: Conjunctivae normal.      Pupils: Pupils are equal, round, and reactive to light.   Cardiovascular:      Rate and Rhythm: Normal rate and regular rhythm.   Pulmonary:      Effort: Pulmonary effort is normal. No respiratory distress.      Breath sounds: Normal breath sounds.   Musculoskeletal:         General: No swelling.   Skin:     General: Skin is warm and dry.      Coloration: Skin is not jaundiced.   Neurological:      Mental Status: He is alert.      Comments: Oriented to person and place but not time   Psychiatric:         Mood and Affect: Mood normal.      Comments: Pleasant and cooperative today                 HEALTH RISK ASSESSMENT    Smoking Status:  Social History     Tobacco Use   Smoking Status Former Smoker   • Packs/day: 0.25   • Years: 15.00   • Pack years: 3.75   • Types: Cigarettes   • Quit date: 1976   • Years since quittin.1   Smokeless Tobacco Never Used     Alcohol Consumption:  Social History     Substance and Sexual Activity   Alcohol Use No     Fall Risk Screen:    STEADI Fall Risk Assessment was completed, and patient is at HIGH risk for falls. Assessment completed on:2022    Depression Screening:  PHQ-2/PHQ-9 Depression Screening 2022   Retired PHQ-9 Total Score -   Retired Total Score -   Little Interest or Pleasure in Doing Things 0-->not at all   Feeling Down, Depressed or Hopeless 0-->not at all   PHQ-9: Brief Depression Severity Measure Score 0       Health Habits and Functional and Cognitive Screening:  Functional & Cognitive Status 2022   Do you have difficulty preparing food and eating? No   Do you have difficulty bathing yourself, getting dressed or grooming yourself? No   Do you have difficulty using the toilet? No   Do you  have difficulty moving around from place to place? No   Do you have trouble with steps or getting out of a bed or a chair? No   Current Diet Well Balanced Diet   Dental Exam Up to date   Eye Exam Up to date   Exercise (times per week) 7 times per week   Current Exercises Include Yard Work;Walking   Current Exercise Activities Include -   Do you need help using the phone?  No   Are you deaf or do you have serious difficulty hearing?  Yes   Do you need help with transportation? No   Do you need help shopping? No   Do you need help preparing meals?  No   Do you need help with housework?  No   Do you need help with laundry? No   Do you need help taking your medications? Yes   Do you need help managing money? No   Do you ever drive or ride in a car without wearing a seat belt? No   Have you felt unusual stress, anger or loneliness in the last month? No   Who do you live with? Spouse   If you need help, do you have trouble finding someone available to you? No   Have you been bothered in the last four weeks by sexual problems? No   Do you have difficulty concentrating, remembering or making decisions? Yes       Age-appropriate Screening Schedule:  Refer to the list below for future screening recommendations based on patient's age, sex and/or medical conditions. Orders for these recommended tests are listed in the plan section. The patient has been provided with a written plan.    Health Maintenance   Topic Date Due   • ZOSTER VACCINE (1 of 2) Never done   • INFLUENZA VACCINE  10/01/2022   • LIPID PANEL  12/20/2022   • TDAP/TD VACCINES (3 - Td or Tdap) 09/06/2027              Assessment & Plan   CMS Preventative Services Quick Reference  Risk Factors Identified During Encounter    Immunizations Discussed/Encouraged (specific Immunizations; Shingrix and COVID19. I recommended updating Shingrix and Covid booster at the pharmacy. I recommended updating flu shot next month.     Age appropriate screenings were discussed with  patient today. We will update metabolic screenings today.    I reviewed PDMP today. I recommended increasing Tramadol up to 4 times a day as needed for pain control. He is up to date on cs contract and uds.     I increased aricept today after patient's spouse reported that his memory has significantly declined, despite his current regimen.     I reviewed GI, Nephrology notes today. I also reviewed psa level with patient. He plans to follow up with urology later this week.  I encouraged patient to follow up with his specialists as planned.     The above risks/problems have been discussed with the patient.  Follow up actions/plans if indicated are seen below in the Assessment/Plan Section.  Pertinent information has been shared with the patient in the After Visit Summary.    Diagnoses and all orders for this visit:    1. Encounter for wellness examination (Primary)  -     Comprehensive Metabolic Panel  -     CBC (No Diff)  -     Lipid Panel  -     TSH  -     Iron Profile  -     Vitamin B12  -     T4, Free  -     Urinalysis With Culture If Indicated -    2. Health care maintenance  -     Comprehensive Metabolic Panel  -     CBC (No Diff)  -     Lipid Panel  -     TSH  -     Iron Profile  -     Vitamin B12  -     T4, Free  -     Urinalysis With Culture If Indicated -    3. Chronic osteoarthritis  -     traMADol (ULTRAM) 50 MG tablet; Take 1 tablet by mouth Every 6 (Six) Hours As Needed for Moderate Pain.  Dispense: 120 tablet; Refill: 2    4. Spinal stenosis of lumbar region with neurogenic claudication  -     triamcinolone acetonide (KENALOG-40) injection 60 mg    5. DDD (degenerative disc disease), lumbar  -     triamcinolone acetonide (KENALOG-40) injection 60 mg    6. Essential hypertension  -     Comprehensive Metabolic Panel  -     CBC (No Diff)  -     Lipid Panel  -     TSH  -     Iron Profile  -     Vitamin B12  -     T4, Free  -     Urinalysis With Culture If Indicated -    7. Mild cognitive impairment with  memory loss  -     Comprehensive Metabolic Panel  -     CBC (No Diff)  -     Lipid Panel  -     TSH  -     Iron Profile  -     Vitamin B12  -     T4, Free  -     donepezil (Aricept) 23 MG tablet; Take 1 tablet by mouth Every Night.  Dispense: 90 tablet; Refill: 1  -     Urinalysis With Culture If Indicated -    8. Iron deficiency anemia due to chronic blood loss  -     Iron Profile  -     Vitamin B12    9. Stage 3b chronic kidney disease (HCC)    10. Prostate cancer (HCC)        Follow Up:   Return in about 1 month (around 10/12/2022), or if symptoms worsen or fail to improve, for Recheck.     An After Visit Summary and PPPS were made available to the patient.

## 2022-09-12 NOTE — PROGRESS NOTES
Venipuncture Blood Specimen Collection  Venipuncture performed in LEFT ARM by Esperanza Loya RN with good hemostasis. Patient tolerated the procedure well without complications.   09/12/22        Injection  Injection performed in RIGHT VENTTOGLUTEAL by Esperanza Loya RN. Patient tolerated the procedure well without complications.  09/12/22   Esperanza Loya RN

## 2022-09-13 LAB
ALBUMIN SERPL-MCNC: 3.8 G/DL (ref 3.5–5.2)
ALBUMIN/GLOB SERPL: 2 G/DL
ALP SERPL-CCNC: 33 U/L (ref 39–117)
ALT SERPL W P-5'-P-CCNC: 11 U/L (ref 1–41)
ANION GAP SERPL CALCULATED.3IONS-SCNC: 8 MMOL/L (ref 5–15)
AST SERPL-CCNC: 18 U/L (ref 1–40)
BACTERIA UR QL AUTO: NORMAL /HPF
BILIRUB SERPL-MCNC: 0.2 MG/DL (ref 0–1.2)
BILIRUB UR QL STRIP: NEGATIVE
BUN SERPL-MCNC: 32 MG/DL (ref 8–23)
BUN/CREAT SERPL: 19.5 (ref 7–25)
CALCIUM SPEC-SCNC: 9 MG/DL (ref 8.6–10.5)
CHLORIDE SERPL-SCNC: 107 MMOL/L (ref 98–107)
CHOLEST SERPL-MCNC: 134 MG/DL (ref 0–200)
CLARITY UR: CLEAR
CO2 SERPL-SCNC: 26 MMOL/L (ref 22–29)
COLOR UR: ABNORMAL
CREAT SERPL-MCNC: 1.64 MG/DL (ref 0.76–1.27)
DEPRECATED RDW RBC AUTO: 49.4 FL (ref 37–54)
EGFRCR SERPLBLD CKD-EPI 2021: 40.7 ML/MIN/1.73
ERYTHROCYTE [DISTWIDTH] IN BLOOD BY AUTOMATED COUNT: 14.1 % (ref 12.3–15.4)
GLOBULIN UR ELPH-MCNC: 1.9 GM/DL
GLUCOSE SERPL-MCNC: 91 MG/DL (ref 65–99)
GLUCOSE UR STRIP-MCNC: NEGATIVE MG/DL
HCT VFR BLD AUTO: 26.8 % (ref 37.5–51)
HDLC SERPL-MCNC: 41 MG/DL (ref 40–60)
HGB BLD-MCNC: 9 G/DL (ref 13–17.7)
HGB UR QL STRIP.AUTO: NEGATIVE
HYALINE CASTS UR QL AUTO: NORMAL /LPF
IRON 24H UR-MRATE: 55 MCG/DL (ref 59–158)
IRON SATN MFR SERPL: 19 % (ref 20–50)
KETONES UR QL STRIP: ABNORMAL
LDLC SERPL CALC-MCNC: 68 MG/DL (ref 0–100)
LDLC/HDLC SERPL: 1.57 {RATIO}
LEUKOCYTE ESTERASE UR QL STRIP.AUTO: NEGATIVE
MCH RBC QN AUTO: 32.3 PG (ref 26.6–33)
MCHC RBC AUTO-ENTMCNC: 33.6 G/DL (ref 31.5–35.7)
MCV RBC AUTO: 96.1 FL (ref 79–97)
NITRITE UR QL STRIP: NEGATIVE
PH UR STRIP.AUTO: 6 [PH] (ref 5–8)
PLATELET # BLD AUTO: 212 10*3/MM3 (ref 140–450)
PMV BLD AUTO: 10.5 FL (ref 6–12)
POTASSIUM SERPL-SCNC: 5 MMOL/L (ref 3.5–5.2)
PROT SERPL-MCNC: 5.7 G/DL (ref 6–8.5)
PROT UR QL STRIP: ABNORMAL
RBC # BLD AUTO: 2.79 10*6/MM3 (ref 4.14–5.8)
RBC # UR STRIP: NORMAL /HPF
REF LAB TEST METHOD: NORMAL
SODIUM SERPL-SCNC: 141 MMOL/L (ref 136–145)
SP GR UR STRIP: 1.02 (ref 1–1.03)
SQUAMOUS #/AREA URNS HPF: NORMAL /HPF
T4 FREE SERPL-MCNC: 1.09 NG/DL (ref 0.93–1.7)
TIBC SERPL-MCNC: 283 MCG/DL (ref 298–536)
TRANSFERRIN SERPL-MCNC: 190 MG/DL (ref 200–360)
TRIGL SERPL-MCNC: 144 MG/DL (ref 0–150)
TSH SERPL DL<=0.05 MIU/L-ACNC: 1.24 UIU/ML (ref 0.27–4.2)
UROBILINOGEN UR QL STRIP: ABNORMAL
VIT B12 BLD-MCNC: 1278 PG/ML (ref 211–946)
VLDLC SERPL-MCNC: 25 MG/DL (ref 5–40)
WBC # UR STRIP: NORMAL /HPF
WBC NRBC COR # BLD: 8.04 10*3/MM3 (ref 3.4–10.8)

## 2022-09-15 ENCOUNTER — OFFICE VISIT (OUTPATIENT)
Dept: UROLOGY | Facility: CLINIC | Age: 87
End: 2022-09-15

## 2022-09-15 VITALS — BODY MASS INDEX: 24.34 KG/M2 | HEIGHT: 70 IN | WEIGHT: 170 LBS

## 2022-09-15 DIAGNOSIS — C61 PROSTATE CANCER: Primary | ICD-10-CM

## 2022-09-15 DIAGNOSIS — N40.0 BPH WITHOUT OBSTRUCTION/LOWER URINARY TRACT SYMPTOMS: ICD-10-CM

## 2022-09-15 PROCEDURE — 99213 OFFICE O/P EST LOW 20 MIN: CPT | Performed by: NURSE PRACTITIONER

## 2022-09-15 NOTE — PROGRESS NOTES
Chief Complaint  Prostate Cancer (YEARLY FOLLOW UP PROSTATE CANCER /EVAL PSA                                )    Subjective          Mireille Woods presents to [facility] for   History of Present Illness    MR. MIREILLE WOODS is a very pleasant 86-year-old male with a significant history of prostate cancer, who returns to clinic today for evaluation, accompanied by his wife of over 65 years-Risa.  This is his one year follow-up on BPH with LUTS, and Prostate cancer.  The patient is in no apparent distress today and reports doing relatively well over this last year.  His most recent PSA is <0.014 on 08/23/2022, his PSA was also<0.014 the year prior 09/10/2021.  His PSA 3 years prior has been very consistent at < 0.014.  Patient is currently on Flomax daily for BPH with LUTS.  He denies any side effects from medication.     Patient was diagnosed with  prostate cancer stage T2b in 2014.  He had 12/12 biopsies positive for Tubac score of 7.  He was treated with radiation therapy that finished in 2015.  Post therapy he had 2 years of Lupron injections.  His last Lupron was in September 2016,  On evaluation today he denies having any more hot flashes, he denies bone pain, denies increased fatigue.  He is doing relatively well at this age, and is very pleased.      Patient reports currently seeing Dr. Diana for stage III kidney disease.  His most recent creatinine was 1.64, with a BUN of 32, And an eGFR of 40.7 on recent labs done 09/12/2022.  He is currently on steroids injections, for hip pain/lumbago and reports tolerating them well. He is very high spirited, with a very strong fide. He has nocturia 1-2, depending on his late night fluid intake.    Today, he denies having any urinary symptoms of frequency, urgency, or dysuria.  He denies any burning with urination, urine hesitancy or postvoid dribbling.  He denies pelvic pressure or suprapubic discomfort.  Denies abdominal  "pain.  He has significant lower back pain, flank pain, back he denies any CVA tenderness.  No N/V/D, he denies chills or fevers.  He is unable to give us any urine sample today, his IPSS score is 3, his PVR is 0 cc.     The rest of his PMHx as listed below.     Objective   Vital Signs:   Ht 177.8 cm (70\")   Wt 77.1 kg (170 lb)   BMI 24.39 kg/m²       ROS:   Review of Systems   Constitutional: Positive for activity change, appetite change and fatigue. Negative for chills, diaphoresis, fever, unexpected weight gain and unexpected weight loss.   HENT: Negative for congestion, ear discharge, ear pain, nosebleeds, rhinorrhea, sinus pressure and sore throat.    Eyes: Negative for blurred vision, double vision, photophobia, pain, redness and visual disturbance.   Respiratory: Negative for apnea, cough, chest tightness, shortness of breath, wheezing and stridor.    Cardiovascular: Negative for chest pain and palpitations.   Gastrointestinal: Negative for abdominal distention, abdominal pain, constipation, diarrhea, nausea and vomiting.   Endocrine: Negative for polydipsia, polyphagia and polyuria.   Genitourinary: Negative for decreased urine volume, difficulty urinating, discharge, dysuria, flank pain, frequency, genital sores, hematuria, penile pain, penile swelling, scrotal swelling, testicular pain, urgency and urinary incontinence.   Musculoskeletal: Positive for gait problem, joint swelling and myalgias. Negative for arthralgias and back pain.   Skin: Positive for color change and pallor. Negative for rash and wound.   Neurological: Negative for dizziness, tremors, syncope, weakness, light-headedness, memory problem and confusion.   Psychiatric/Behavioral: Positive for sleep disturbance and stress. Negative for agitation, behavioral problems and decreased concentration.        Physical Exam  Constitutional:       General: He is not in acute distress.     Appearance: He is well-developed.   HENT:      Head: " Normocephalic and atraumatic.      Right Ear: External ear normal.      Left Ear: External ear normal.   Eyes:      General:         Right eye: No discharge.         Left eye: No discharge.      Conjunctiva/sclera: Conjunctivae normal.      Pupils: Pupils are equal, round, and reactive to light.   Neck:      Thyroid: No thyromegaly.      Trachea: No tracheal deviation.   Cardiovascular:      Rate and Rhythm: Normal rate and regular rhythm.      Heart sounds: No murmur heard.    No friction rub.   Pulmonary:      Effort: Pulmonary effort is normal. No respiratory distress.      Breath sounds: Normal breath sounds. No stridor.   Abdominal:      General: Bowel sounds are normal. There is distension.      Palpations: Abdomen is soft.      Tenderness: There is abdominal tenderness. There is no guarding.   Genitourinary:     Penis: Normal and uncircumcised. No tenderness or discharge.       Testes: Normal.      Rectum: Normal. Guaiac result negative.   Musculoskeletal:         General: Tenderness (  Uses a cane for mobility) present. No deformity. Normal range of motion.      Cervical back: Normal range of motion and neck supple.   Skin:     General: Skin is warm and dry.      Capillary Refill: Capillary refill takes less than 2 seconds.      Coloration: Skin is pale.      Findings: Bruising present.   Neurological:      Mental Status: He is alert and oriented to person, place, and time.      Cranial Nerves: No cranial nerve deficit.      Motor: Weakness present.      Coordination: Coordination abnormal.      Gait: Gait abnormal.   Psychiatric:         Behavior: Behavior normal.         Thought Content: Thought content normal.         Judgment: Judgment normal.        IPSS Questionnaire (AUA-7):  Over the past month…    1)  How often have you had a sensation of not emptying your bladder completely after you finish urinating?  1 - Less than 1 time in 5   2)  How often have you had to urinate again less than two hours after  you finished urinating? 0 - Not at all   3)  How often have you found you stopped and started again several times when you urinated?  0 - Not at all   4) How difficult have you found it to postpone urination?  0 - Not at all   5) How often have you had a weak urinary stream?  1 - Less than 1 time in 5   6) How often have you had to push or strain to begin urination?  0 - Not at all   7) How many times did you most typically get up to urinate from the time you went to bed until the time you got up in the morning?  1 - 1 time   Total score:  0-7 mildly symptomatic                                                       3    8-19 moderately symptomatic    20-35 severely symptomatic       Result Review :     PSA    PSA 8/23/22   PSA <0.014                Assessment and Plan    Problem List Items Addressed This Visit        Hematology and Neoplasia    Prostate cancer (HCC) - Primary    Relevant Orders    PSA, Total & Free      Other Visit Diagnoses     BPH without obstruction/lower urinary tract symptoms        Relevant Orders    PSA, Total & Free          Patient reports that he is not currently experiencing any symptoms of urinary incontinence.    BMI is within normal parameters. No other follow-up for BMI required.    RADIOLOGY (CT AND/OR KUB):    CT Abdomen and Pelvis: No results found for this or any previous visit.     CT Stone Protocol: No results found for this or any previous visit.     KUB: No results found for this or any previous visit.     LABS (3 MONTHS):    Office Visit on 09/12/2022   Component Date Value Ref Range Status   • Glucose 09/12/2022 91  65 - 99 mg/dL Final   • BUN 09/12/2022 32 (A) 8 - 23 mg/dL Final   • Creatinine 09/12/2022 1.64 (A) 0.76 - 1.27 mg/dL Final   • Sodium 09/12/2022 141  136 - 145 mmol/L Final   • Potassium 09/12/2022 5.0  3.5 - 5.2 mmol/L Final   • Chloride 09/12/2022 107  98 - 107 mmol/L Final   • CO2 09/12/2022 26.0  22.0 - 29.0 mmol/L Final   • Calcium 09/12/2022 9.0  8.6 -  10.5 mg/dL Final   • Total Protein 09/12/2022 5.7 (A) 6.0 - 8.5 g/dL Final   • Albumin 09/12/2022 3.80  3.50 - 5.20 g/dL Final   • ALT (SGPT) 09/12/2022 11  1 - 41 U/L Final   • AST (SGOT) 09/12/2022 18  1 - 40 U/L Final   • Alkaline Phosphatase 09/12/2022 33 (A) 39 - 117 U/L Final   • Total Bilirubin 09/12/2022 0.2  0.0 - 1.2 mg/dL Final   • Globulin 09/12/2022 1.9  gm/dL Final   • A/G Ratio 09/12/2022 2.0  g/dL Final   • BUN/Creatinine Ratio 09/12/2022 19.5  7.0 - 25.0 Final   • Anion Gap 09/12/2022 8.0  5.0 - 15.0 mmol/L Final   • eGFR 09/12/2022 40.7 (A) >60.0 mL/min/1.73 Final    National Kidney Foundation and American Society of Nephrology (ASN) Task Force recommended calculation based on the Chronic Kidney Disease Epidemiology Collaboration (CKD-EPI) equation refit without adjustment for race.   • WBC 09/12/2022 8.04  3.40 - 10.80 10*3/mm3 Final   • RBC 09/12/2022 2.79 (A) 4.14 - 5.80 10*6/mm3 Final   • Hemoglobin 09/12/2022 9.0 (A) 13.0 - 17.7 g/dL Final   • Hematocrit 09/12/2022 26.8 (A) 37.5 - 51.0 % Final   • MCV 09/12/2022 96.1  79.0 - 97.0 fL Final   • MCH 09/12/2022 32.3  26.6 - 33.0 pg Final   • MCHC 09/12/2022 33.6  31.5 - 35.7 g/dL Final   • RDW 09/12/2022 14.1  12.3 - 15.4 % Final   • RDW-SD 09/12/2022 49.4  37.0 - 54.0 fl Final   • MPV 09/12/2022 10.5  6.0 - 12.0 fL Final   • Platelets 09/12/2022 212  140 - 450 10*3/mm3 Final   • Total Cholesterol 09/12/2022 134  0 - 200 mg/dL Final   • Triglycerides 09/12/2022 144  0 - 150 mg/dL Final   • HDL Cholesterol 09/12/2022 41  40 - 60 mg/dL Final   • LDL Cholesterol  09/12/2022 68  0 - 100 mg/dL Final   • VLDL Cholesterol 09/12/2022 25  5 - 40 mg/dL Final   • LDL/HDL Ratio 09/12/2022 1.57   Final   • TSH 09/12/2022 1.240  0.270 - 4.200 uIU/mL Final   • Iron 09/12/2022 55 (A) 59 - 158 mcg/dL Final   • Iron Saturation 09/12/2022 19 (A) 20 - 50 % Final   • Transferrin 09/12/2022 190 (A) 200 - 360 mg/dL Final   • TIBC 09/12/2022 283 (A) 298 - 536 mcg/dL  Final   • Vitamin B-12 09/12/2022 1,278 (A) 211 - 946 pg/mL Final   • Free T4 09/12/2022 1.09  0.93 - 1.70 ng/dL Final   • Color, UA 09/12/2022 Dark Yellow (A) Yellow, Straw Final   • Appearance, UA 09/12/2022 Clear  Clear Final   • pH, UA 09/12/2022 6.0  5.0 - 8.0 Final   • Specific Gravity, UA 09/12/2022 1.023  1.005 - 1.030 Final   • Glucose, UA 09/12/2022 Negative  Negative Final   • Ketones, UA 09/12/2022 Trace (A) Negative Final   • Bilirubin, UA 09/12/2022 Negative  Negative Final   • Blood, UA 09/12/2022 Negative  Negative Final   • Protein, UA 09/12/2022 30 mg/dL (1+) (A) Negative Final   • Leuk Esterase, UA 09/12/2022 Negative  Negative Final   • Nitrite, UA 09/12/2022 Negative  Negative Final   • Urobilinogen, UA 09/12/2022 0.2 E.U./dL  0.2 - 1.0 E.U./dL Final   • RBC, UA 09/12/2022 0-2  None Seen, 0-2 /HPF Final   • WBC, UA 09/12/2022 0-2  None Seen, 0-2 /HPF Final   • Bacteria, UA 09/12/2022 None Seen  None Seen /HPF Final   • Squamous Epithelial Cells, UA 09/12/2022 0-2  None Seen, 0-2 /HPF Final   • Hyaline Casts, UA 09/12/2022 0-2  None Seen /LPF Final   • Methodology 09/12/2022 Automated Microscopy   Final   Clinical Support on 07/18/2022   Component Date Value Ref Range Status   • Protein/Creatinine Ratio, Urine 07/18/2022 157.1  0.0 - 200.0 mg/G Crea Final   • Creatinine, Urine 07/18/2022 237.5  mg/dL Final   • Total Protein, Urine 07/18/2022 37.3  mg/dL Final   • Microalbumin/Creatinine Ratio 07/18/2022 72.8  mg/g Final   • Creatinine, Urine 07/18/2022 237.5  mg/dL Final   • Microalbumin, Urine 07/18/2022 17.3  mg/dL Final   • Glucose 07/18/2022 96  65 - 99 mg/dL Final   • BUN 07/18/2022 29 (A) 8 - 23 mg/dL Final   • Creatinine 07/18/2022 1.79 (A) 0.76 - 1.27 mg/dL Final   • Sodium 07/18/2022 138  136 - 145 mmol/L Final   • Potassium 07/18/2022 5.1  3.5 - 5.2 mmol/L Final   • Chloride 07/18/2022 103  98 - 107 mmol/L Final   • CO2 07/18/2022 25.0  22.0 - 29.0 mmol/L Final   • Calcium 07/18/2022  9.2  8.6 - 10.5 mg/dL Final   • BUN/Creatinine Ratio 07/18/2022 16.2  7.0 - 25.0 Final   • Anion Gap 07/18/2022 10.0  5.0 - 15.0 mmol/L Final   • eGFR 07/18/2022 36.7 (A) >60.0 mL/min/1.73 Final    National Kidney Foundation and American Society of Nephrology (ASN) Task Force recommended calculation based on the Chronic Kidney Disease Epidemiology Collaboration (CKD-EPI) equation refit without adjustment for race.           ASSESSMENT   PROSTATE CANCER/BPH WITH LOWER URINARY TRACT SYMPTOMS-LUTS  MR. MIREILLE WOODS is a very pleasant 86-year-old male with a significant history of prostate cancer, who returns to clinic today for evaluation, accompanied by his wife of over 65 years-Risa.  This is his one year follow-up on BPH with LUTS-nocturia, and Prostate cancer.  The patient is in no apparent distress today and reports doing relatively well over this last year.  His most recent PSA is <0.014 on 08/23/2022, his PSA was also<0.014 the year prior 09/10/2021.  His PSA 3 years prior has been very consistent at < 0.014.  Patient is currently on Flomax daily for BPH with LUTS.  He denies any side effects from medication.  He denies any urinary symptoms of frequency urgency or dysuria, he denies any burning on urination.  He is unable to give us any urine sample today, reports voiding prior to appointment time.  His IPSS score is 3, his PVR is 0 mL.    BPH: AGAIN, WE Discussed the pathophysiology of BPH and obstruction. We discussed the static and dynamic effects of BPH as well as using 5 alpha reductase inhibitors versus alpha blockade.  We discussed the indications for transurethral surgery as well.  And/ or other therapeutic options available including all of the newer techniques.  He has no real symptomatology referable to his prostate post radiation therapy.  Rather than proceed with an expensive workup he doesn't need, at this time I am recommending he continue with an alpha blocker tamsulosin 0.4 mg capsule daily.  WE Discussed medical therapy with Flomax, and I also explained how on Flomax, it relaxes, so that he can he can always urinate better.     PROSTATE CANCER: WE discussed the natural history of prostate cancer and ongoing controversy regarding screening and potential treatment outcomes. The meaning of a false positive PSA and a false negative PSA has been discussed. He indicates understanding of the limitations of this screening test and he is willing to proceed with repeat yearly PSA testing.     We will follow-up with patient next year, with PSA results,     Discussed following AN iron rich diet due to his concerns of anemia H&H 9.2/26.0    He has been encouraged to return sooner if need be.     Patient/wife agreeable with plan of care.     Smoking Cessation Counseling:  Former smoker. QUIT 1976  Patient does not currently use any tobacco products.      Follow Up   Return in about 1 year (around 9/11/2023) for Next scheduled follow up FOR PROSTATE CANCER/ WITH PSDA PRIOR, Next scheduled follow up.    Patient was given instructions and counseling regarding his condition or for health maintenance advice. Please see specific information pulled into the AVS if appropriate.          This document has been electronically signed by Griselda Cheng-Akwa, APRN   September 15, 2022 14:23 EDT      Dictated Utilizing Dragon Dictation: Part of this note may be an electronic transcription/translation of spoken language to printed text using the Dragon Dictation System.

## 2022-10-13 ENCOUNTER — OFFICE VISIT (OUTPATIENT)
Dept: FAMILY MEDICINE CLINIC | Facility: CLINIC | Age: 87
End: 2022-10-13

## 2022-10-13 VITALS
BODY MASS INDEX: 23.96 KG/M2 | DIASTOLIC BLOOD PRESSURE: 62 MMHG | OXYGEN SATURATION: 98 % | TEMPERATURE: 97.8 F | SYSTOLIC BLOOD PRESSURE: 132 MMHG | HEART RATE: 66 BPM | WEIGHT: 167 LBS

## 2022-10-13 DIAGNOSIS — K21.9 GASTROESOPHAGEAL REFLUX DISEASE WITHOUT ESOPHAGITIS: Chronic | ICD-10-CM

## 2022-10-13 DIAGNOSIS — G31.84 MILD COGNITIVE IMPAIRMENT WITH MEMORY LOSS: Primary | Chronic | ICD-10-CM

## 2022-10-13 DIAGNOSIS — M51.36 DDD (DEGENERATIVE DISC DISEASE), LUMBAR: Chronic | ICD-10-CM

## 2022-10-13 DIAGNOSIS — M48.062 SPINAL STENOSIS OF LUMBAR REGION WITH NEUROGENIC CLAUDICATION: Chronic | ICD-10-CM

## 2022-10-13 DIAGNOSIS — I13.10 HYPERTENSIVE HEART AND CHRONIC KIDNEY DISEASE STAGE 3: Chronic | ICD-10-CM

## 2022-10-13 DIAGNOSIS — N18.30 HYPERTENSIVE HEART AND CHRONIC KIDNEY DISEASE STAGE 3: Chronic | ICD-10-CM

## 2022-10-13 PROCEDURE — 90662 IIV NO PRSV INCREASED AG IM: CPT | Performed by: INTERNAL MEDICINE

## 2022-10-13 PROCEDURE — 99214 OFFICE O/P EST MOD 30 MIN: CPT | Performed by: INTERNAL MEDICINE

## 2022-10-13 PROCEDURE — G0008 ADMIN INFLUENZA VIRUS VAC: HCPCS | Performed by: INTERNAL MEDICINE

## 2022-10-13 RX ORDER — HYDROCODONE BITARTRATE AND ACETAMINOPHEN 5; 325 MG/1; MG/1
1 TABLET ORAL EVERY 8 HOURS PRN
Qty: 90 TABLET | Refills: 0 | Status: SHIPPED | OUTPATIENT
Start: 2022-10-13 | End: 2022-11-09 | Stop reason: SDUPTHER

## 2022-10-13 RX ORDER — ESOMEPRAZOLE MAGNESIUM 40 MG/1
40 CAPSULE, DELAYED RELEASE ORAL
Qty: 30 CAPSULE | Refills: 5 | Status: SHIPPED | OUTPATIENT
Start: 2022-10-13 | End: 2022-11-04 | Stop reason: SDUPTHER

## 2022-10-13 NOTE — PROGRESS NOTES
Patient Name: Rubén Rivas Today's Date: 10/13/2022   Patient MRN / CSN: [id number] / [id number] Date of Encounter: 10/13/2022   Patient Age / : 87 y.o. / 1935 Encounter Provider: Laura Fulton DO   Referring Physician: No ref. provider found          Rubén is a 87 y.o. male who is being seen today for Follow-up, Heartburn, and Back Pain      History of Present Illness  Mr. Rivas presents today for follow-up on mild cognitive impairment with memory loss.  He has been taking Aricept for quite some time.  His family presented with him at last visit with concerns of worsening memory loss.  We agreed to increase Aricept at that time last month.  Patient presents, brought in by his daughter today, and both patient and his daughter report that this has helped.  He is tolerating Aricept well without any noted side effects.  They are pleased with his response to it.    Mr. Rivas has chronic lumbar back pain due to lumbar degenerative disc disease with spinal stenosis.  He reports his pain is continuing to worsen and is not well controlled despite tramadol therapy.  He has been taking tramadol 50 mg 4 times daily most days without adequate pain control.  He cannot take NSAIDs due to his chronic kidney disease stage III as well as his history of ruptured gastric ulcer previously.  He is not interested in lumbar injections at this time.  He reports intermittent steroids do help.  He ambulates with a cane but would like to try something that would keep his pain under better control.  His blood pressure is controlled, despite his lumbar back pain.  Heartburn  He complains of belching, dysphagia and heartburn. This is a chronic problem. The problem has been gradually worsening. Treatments tried: Protonix does not seem to be helping anymore.       Allergies include:Sulfa antibiotics  Current Outpatient Medications   Medication Sig Dispense Refill   • amLODIPine (NORVASC) 5 MG tablet Take 1.5 tablets by mouth Daily. as  directed 45 tablet 6   • aspirin 81 MG tablet Take  by mouth daily.     • Budesonide (ENTOCORT EC) 3 MG 24 hr capsule Take 6 mg by mouth Daily.     • clopidogrel (PLAVIX) 75 MG tablet Take 1 tablet by mouth Daily. 90 tablet 0   • colesevelam (WELCHOL) 625 MG tablet Take 625 mg by mouth 2 (Two) Times a Day.     • diphenhydrAMINE-acetaminophen (TYLENOL PM)  MG tablet per tablet Take 1 tablet by mouth At Night As Needed for sleep.     • diphenoxylate-atropine (Lomotil) 2.5-0.025 MG per tablet Take 1 tablet by mouth 4 (Four) Times a Day As Needed for Diarrhea. 20 tablet 0   • donepezil (Aricept) 23 MG tablet Take 1 tablet by mouth Every Night. 90 tablet 1   • escitalopram (LEXAPRO) 20 MG tablet Take 1 tablet by mouth Daily. 90 tablet 1   • FIBER PO Take  by mouth Daily.     • Glucose Blood (ONETOUCH ULTRA BLUE VI) daily.     • losartan (COZAAR) 25 MG tablet      • metoprolol tartrate (LOPRESSOR) 25 MG tablet Take 1 tablet by mouth 2 (Two) Times a Day. 180 tablet 1   • Multiple Vitamins-Minerals (ONE-A-DAY MENS 50+ ADVANTAGE PO) Take  by mouth daily.     • mupirocin (BACTROBAN) 2 % ointment Apply  topically to the appropriate area as directed 3 (Three) Times a Day. 15 g 1   • rosuvastatin (CRESTOR) 20 MG tablet Take 1 tablet by mouth Daily. 90 tablet 3   • tamsulosin (FLOMAX) 0.4 MG capsule 24 hr capsule Take 1 capsule by mouth Daily. 90 capsule 1   • vitamin B-12 (CYANOCOBALAMIN) 1000 MCG tablet Take 1,000 mcg by mouth Daily.     • esomeprazole (nexIUM) 40 MG capsule Take 1 capsule by mouth Every Morning Before Breakfast. 30 capsule 5   • HYDROcodone-acetaminophen (Norco) 5-325 MG per tablet Take 1 tablet by mouth Every 8 (Eight) Hours As Needed for Moderate Pain. 90 tablet 0     Current Facility-Administered Medications   Medication Dose Route Frequency Provider Last Rate Last Admin   • methylPREDNISolone sodium succinate (SOLU-Medrol) injection 80 mg  80 mg Intramuscular Once Laura Fulton,          Past  Medical History:   Diagnosis Date   • Anemia    • Arthritis    • Depression    • Hyperlipidemia    • Hypertension    • Prostate cancer (HCC)    • Stage 3 chronic kidney disease (HCC) 2021     Family History   Problem Relation Age of Onset   • Cancer Mother 35        breast   • Heart attack Father    • Cancer Sister         cervical   • Heart attack Brother    • Heart disease Brother    • Cancer Brother         Prostate     Past Surgical History:   Procedure Laterality Date   • CARDIAC SURGERY     • COLONOSCOPY     • HERNIA REPAIR     • LAPAROSCOPIC CHOLECYSTECTOMY  2020    PERFORMED AT [facility]   • STOMACH SURGERY       Social History     Substance and Sexual Activity   Alcohol Use No     Social History     Tobacco Use   Smoking Status Former   • Packs/day: 0.25   • Years: 15.00   • Pack years: 3.75   • Types: Cigarettes   • Quit date: 1976   • Years since quittin.2   Smokeless Tobacco Never     Social History     Substance and Sexual Activity   Drug Use No     Review of Systems   Gastrointestinal: Positive for dysphagia and heartburn.        Acid reflux and heartburn, worsening.   Musculoskeletal: Positive for arthralgias and back pain.   Psychiatric/Behavioral: Positive for confusion.        Confusion and memory have improved with current Aricept dose.        Depression Assessment Review:  PHQ-9 Total Score:    Vital Signs & Measurements Taken This Encounter  /62 (BP Location: Left arm, Patient Position: Sitting, Cuff Size: Adult)   Pulse 66   Temp 97.8 °F (36.6 °C) (Temporal)   Wt 75.8 kg (167 lb)   SpO2 98%   BMI 23.96 kg/m²    SpO2 Percentage    10/13/22 1327   SpO2: 98%        BMI is within normal parameters. No other follow-up for BMI required.      Physical Exam  Vitals reviewed.   Constitutional:       General: He is not in acute distress.  HENT:      Head: Normocephalic and atraumatic.   Eyes:      General: No scleral icterus.     Extraocular Movements: Extraocular  movements intact.      Conjunctiva/sclera: Conjunctivae normal.      Pupils: Pupils are equal, round, and reactive to light.   Cardiovascular:      Rate and Rhythm: Normal rate and regular rhythm.   Pulmonary:      Effort: Pulmonary effort is normal. No respiratory distress.      Breath sounds: Normal breath sounds.   Musculoskeletal:         General: No swelling.      Cervical back: Neck supple. No tenderness.      Comments: Ambulating with walking stick today.   Lymphadenopathy:      Cervical: No cervical adenopathy.   Skin:     General: Skin is warm and dry.      Coloration: Skin is not jaundiced.   Neurological:      Mental Status: He is alert.   Psychiatric:         Mood and Affect: Mood normal.         Behavior: Behavior normal.            Assessment & Plan  Patient Active Problem List   Diagnosis   • Anemia   • Chronic coronary artery disease   • Chronic osteoarthritis   • Depression   • Elevated prostate specific antigen (PSA)   • Enlarged prostate   • Hyperlipidemia   • Essential hypertension   • Iron deficiency   • Low back pain   • Malignant neoplasm of prostate (HCC)   • Prostate cancer (HCC)   • Mild cognitive impairment with memory loss   • Stage 3 chronic kidney disease (HCC)   • Spinal stenosis of lumbar region   • DDD (degenerative disc disease), lumbar   • Diverticulosis   • Gastroesophageal reflux disease without esophagitis       ICD-10-CM ICD-9-CM   1. Mild cognitive impairment with memory loss  G31.84 331.83   2. DDD (degenerative disc disease), lumbar  M51.36 722.52   3. Spinal stenosis of lumbar region with neurogenic claudication  M48.062 724.03   4. Gastroesophageal reflux disease without esophagitis  K21.9 530.81   5. 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     Dispense:  15 g     Refill:  1   • esomeprazole (nexIUM) 40 MG capsule     Sig: Take 1 capsule by mouth Every Morning Before Breakfast.     Dispense:  30 capsule     Refill:  5   • HYDROcodone-acetaminophen (Norco) 5-325 MG per tablet     Sig: Take 1 tablet by mouth Every 8 (Eight) Hours As Needed for Moderate Pain.     Dispense:  90 tablet     Refill:  0       We will continue Aricept at current dose.  I recommended changing Protonix to Nexium to help with patient's uncontrolled reflux.  If Nexium does not work, we will try Dexilant.  In regards to pain control, I discussed hydrocodone in place of tramadol for patient.  I explained to patient and his daughter in detail the potential side effects of this controlled substance.  They expressed verbal understanding.  Patient prefers to hold on pain management referral and is really not interested in injections at this time.  PDMP was reviewed.  Patient is up-to-date on UDS.  We will update this at the next appointment.  We will update controlled substance contract today.  We will also update flu shot today.      Return in about 1 month (around 11/13/2022), or if symptoms worsen or fail to improve, for Recheck.          Referring Provider (if known): No ref. provider found      This document has been electronically signed by Laura Fulton DO  October 13, 2022 14:52 EDT    Laura Fulton DO, FACOI  [address] [place] [address]  [phone] (office)    Part of this note may be an electronic transcription/translation of spoken language to printed text using the Dragon Dictation System.

## 2022-10-13 NOTE — PROGRESS NOTES
Immunization  Immunization performed in RIGHT DELTOID by Esperanza Loya RN. Patient tolerated the procedure well without complications.  10/13/22   Esperanza Loya RN

## 2022-10-20 ENCOUNTER — TRANSCRIBE ORDERS (OUTPATIENT)
Dept: FAMILY MEDICINE CLINIC | Facility: CLINIC | Age: 87
End: 2022-10-20

## 2022-10-20 ENCOUNTER — CLINICAL SUPPORT (OUTPATIENT)
Dept: FAMILY MEDICINE CLINIC | Facility: CLINIC | Age: 87
End: 2022-10-20

## 2022-10-20 DIAGNOSIS — N18.31 STAGE 3A CHRONIC KIDNEY DISEASE: Primary | ICD-10-CM

## 2022-10-20 DIAGNOSIS — N18.31 STAGE 3A CHRONIC KIDNEY DISEASE: ICD-10-CM

## 2022-10-20 PROCEDURE — 84156 ASSAY OF PROTEIN URINE: CPT | Performed by: INTERNAL MEDICINE

## 2022-10-20 PROCEDURE — 80048 BASIC METABOLIC PNL TOTAL CA: CPT | Performed by: INTERNAL MEDICINE

## 2022-10-20 PROCEDURE — 82570 ASSAY OF URINE CREATININE: CPT | Performed by: INTERNAL MEDICINE

## 2022-10-20 PROCEDURE — 36415 COLL VENOUS BLD VENIPUNCTURE: CPT | Performed by: INTERNAL MEDICINE

## 2022-10-20 PROCEDURE — 82043 UR ALBUMIN QUANTITATIVE: CPT | Performed by: INTERNAL MEDICINE

## 2022-10-20 NOTE — PROGRESS NOTES
Venipuncture Blood Specimen Collection  Venipuncture performed in LEFTARM by Esperanza Loya RN with good hemostasis. Patient tolerated the procedure well without complications.   10/20/22   Esperanza Loya RN

## 2022-10-21 LAB
ALBUMIN UR-MCNC: 18.6 MG/DL
ANION GAP SERPL CALCULATED.3IONS-SCNC: 11 MMOL/L (ref 5–15)
BUN SERPL-MCNC: 23 MG/DL (ref 8–23)
BUN/CREAT SERPL: 13.6 (ref 7–25)
CALCIUM SPEC-SCNC: 9.2 MG/DL (ref 8.6–10.5)
CHLORIDE SERPL-SCNC: 106 MMOL/L (ref 98–107)
CO2 SERPL-SCNC: 26 MMOL/L (ref 22–29)
CREAT SERPL-MCNC: 1.69 MG/DL (ref 0.76–1.27)
CREAT UR-MCNC: 191 MG/DL
CREAT UR-MCNC: 191 MG/DL
EGFRCR SERPLBLD CKD-EPI 2021: 39.1 ML/MIN/1.73
GLUCOSE SERPL-MCNC: 157 MG/DL (ref 65–99)
MICROALBUMIN/CREAT UR: 97.4 MG/G
POTASSIUM SERPL-SCNC: 4.4 MMOL/L (ref 3.5–5.2)
PROT ?TM UR-MCNC: 45.9 MG/DL
PROT/CREAT UR: 240.3 MG/G CREA (ref 0–200)
SODIUM SERPL-SCNC: 143 MMOL/L (ref 136–145)

## 2022-10-24 ENCOUNTER — TELEPHONE (OUTPATIENT)
Dept: FAMILY MEDICINE CLINIC | Facility: CLINIC | Age: 87
End: 2022-10-24

## 2022-11-04 ENCOUNTER — OFFICE VISIT (OUTPATIENT)
Dept: FAMILY MEDICINE CLINIC | Facility: CLINIC | Age: 87
End: 2022-11-04

## 2022-11-04 ENCOUNTER — LAB (OUTPATIENT)
Dept: LAB | Facility: HOSPITAL | Age: 87
End: 2022-11-04

## 2022-11-04 ENCOUNTER — HOSPITAL ENCOUNTER (OUTPATIENT)
Dept: CT IMAGING | Facility: HOSPITAL | Age: 87
Discharge: HOME | End: 2022-11-04

## 2022-11-04 VITALS
HEIGHT: 70 IN | WEIGHT: 166 LBS | OXYGEN SATURATION: 98 % | HEART RATE: 78 BPM | BODY MASS INDEX: 23.77 KG/M2 | TEMPERATURE: 97.7 F | SYSTOLIC BLOOD PRESSURE: 108 MMHG | DIASTOLIC BLOOD PRESSURE: 60 MMHG | RESPIRATION RATE: 18 BRPM

## 2022-11-04 DIAGNOSIS — R31.9 URINARY TRACT INFECTION WITH HEMATURIA, SITE UNSPECIFIED: ICD-10-CM

## 2022-11-04 DIAGNOSIS — N39.0 URINARY TRACT INFECTION WITH HEMATURIA, SITE UNSPECIFIED: ICD-10-CM

## 2022-11-04 DIAGNOSIS — N20.0 NEPHROLITHIASIS: ICD-10-CM

## 2022-11-04 DIAGNOSIS — R31.0 GROSS HEMATURIA: ICD-10-CM

## 2022-11-04 DIAGNOSIS — N20.0 NEPHROLITHIASIS: Primary | ICD-10-CM

## 2022-11-04 DIAGNOSIS — R30.0 DYSURIA: ICD-10-CM

## 2022-11-04 DIAGNOSIS — I10 ESSENTIAL HYPERTENSION: Chronic | ICD-10-CM

## 2022-11-04 DIAGNOSIS — K21.9 GASTROESOPHAGEAL REFLUX DISEASE WITHOUT ESOPHAGITIS: Chronic | ICD-10-CM

## 2022-11-04 LAB
ALBUMIN SERPL-MCNC: 3.02 G/DL (ref 3.5–5.2)
ALBUMIN/GLOB SERPL: 1.1 G/DL
ALP SERPL-CCNC: 35 U/L (ref 39–117)
ALT SERPL W P-5'-P-CCNC: 10 U/L (ref 1–41)
ANION GAP SERPL CALCULATED.3IONS-SCNC: 12.3 MMOL/L (ref 5–15)
AST SERPL-CCNC: 14 U/L (ref 1–40)
BASOPHILS # BLD AUTO: 0.03 10*3/MM3 (ref 0–0.2)
BASOPHILS NFR BLD AUTO: 0.3 % (ref 0–1.5)
BILIRUB BLD-MCNC: NEGATIVE MG/DL
BILIRUB SERPL-MCNC: 0.3 MG/DL (ref 0–1.2)
BUN SERPL-MCNC: 28 MG/DL (ref 8–23)
BUN/CREAT SERPL: 16.8 (ref 7–25)
CALCIUM SPEC-SCNC: 8.7 MG/DL (ref 8.6–10.5)
CHLORIDE SERPL-SCNC: 105 MMOL/L (ref 98–107)
CLARITY, POC: CLEAR
CO2 SERPL-SCNC: 22.7 MMOL/L (ref 22–29)
COLOR UR: YELLOW
CREAT SERPL-MCNC: 1.67 MG/DL (ref 0.76–1.27)
DEPRECATED RDW RBC AUTO: 58.6 FL (ref 37–54)
EGFRCR SERPLBLD CKD-EPI 2021: 39.6 ML/MIN/1.73
EOSINOPHIL # BLD AUTO: 0.07 10*3/MM3 (ref 0–0.4)
EOSINOPHIL NFR BLD AUTO: 0.7 % (ref 0.3–6.2)
ERYTHROCYTE [DISTWIDTH] IN BLOOD BY AUTOMATED COUNT: 15.7 % (ref 12.3–15.4)
EXPIRATION DATE: ABNORMAL
GLOBULIN UR ELPH-MCNC: 2.8 GM/DL
GLUCOSE SERPL-MCNC: 162 MG/DL (ref 65–99)
GLUCOSE UR STRIP-MCNC: NEGATIVE MG/DL
HCT VFR BLD AUTO: 27.6 % (ref 37.5–51)
HGB BLD-MCNC: 8.7 G/DL (ref 13–17.7)
IMM GRANULOCYTES # BLD AUTO: 0.06 10*3/MM3 (ref 0–0.05)
IMM GRANULOCYTES NFR BLD AUTO: 0.6 % (ref 0–0.5)
KETONES UR QL: NEGATIVE
LEUKOCYTE EST, POC: ABNORMAL
LYMPHOCYTES # BLD AUTO: 1.22 10*3/MM3 (ref 0.7–3.1)
LYMPHOCYTES NFR BLD AUTO: 11.4 % (ref 19.6–45.3)
Lab: ABNORMAL
MCH RBC QN AUTO: 32.1 PG (ref 26.6–33)
MCHC RBC AUTO-ENTMCNC: 31.5 G/DL (ref 31.5–35.7)
MCV RBC AUTO: 101.8 FL (ref 79–97)
MONOCYTES # BLD AUTO: 0.76 10*3/MM3 (ref 0.1–0.9)
MONOCYTES NFR BLD AUTO: 7.1 % (ref 5–12)
NEUTROPHILS NFR BLD AUTO: 79.9 % (ref 42.7–76)
NEUTROPHILS NFR BLD AUTO: 8.58 10*3/MM3 (ref 1.7–7)
NITRITE UR-MCNC: NEGATIVE MG/ML
NRBC BLD AUTO-RTO: 0 /100 WBC (ref 0–0.2)
PH UR: 6 [PH] (ref 5–8)
PLATELET # BLD AUTO: 208 10*3/MM3 (ref 140–450)
PMV BLD AUTO: 10 FL (ref 6–12)
POTASSIUM SERPL-SCNC: 5 MMOL/L (ref 3.5–5.2)
PROT SERPL-MCNC: 5.8 G/DL (ref 6–8.5)
PROT UR STRIP-MCNC: ABNORMAL MG/DL
RBC # BLD AUTO: 2.71 10*6/MM3 (ref 4.14–5.8)
RBC # UR STRIP: ABNORMAL /UL
SODIUM SERPL-SCNC: 140 MMOL/L (ref 136–145)
SP GR UR: 1.01 (ref 1–1.03)
UROBILINOGEN UR QL: NORMAL
WBC NRBC COR # BLD: 10.72 10*3/MM3 (ref 3.4–10.8)

## 2022-11-04 PROCEDURE — 36415 COLL VENOUS BLD VENIPUNCTURE: CPT | Performed by: NURSE PRACTITIONER

## 2022-11-04 PROCEDURE — 87086 URINE CULTURE/COLONY COUNT: CPT | Performed by: NURSE PRACTITIONER

## 2022-11-04 PROCEDURE — 80053 COMPREHEN METABOLIC PANEL: CPT | Performed by: NURSE PRACTITIONER

## 2022-11-04 PROCEDURE — 87088 URINE BACTERIA CULTURE: CPT | Performed by: NURSE PRACTITIONER

## 2022-11-04 PROCEDURE — 87186 SC STD MICRODIL/AGAR DIL: CPT | Performed by: NURSE PRACTITIONER

## 2022-11-04 PROCEDURE — 85025 COMPLETE CBC W/AUTO DIFF WBC: CPT | Performed by: NURSE PRACTITIONER

## 2022-11-04 PROCEDURE — 99214 OFFICE O/P EST MOD 30 MIN: CPT | Performed by: NURSE PRACTITIONER

## 2022-11-04 PROCEDURE — 74176 CT ABD & PELVIS W/O CONTRAST: CPT | Performed by: RADIOLOGY

## 2022-11-04 PROCEDURE — 74176 CT ABD & PELVIS W/O CONTRAST: CPT

## 2022-11-04 PROCEDURE — 81003 URINALYSIS AUTO W/O SCOPE: CPT | Performed by: NURSE PRACTITIONER

## 2022-11-04 RX ORDER — ESOMEPRAZOLE MAGNESIUM 40 MG/1
40 CAPSULE, DELAYED RELEASE ORAL
Qty: 30 CAPSULE | Refills: 5 | Status: SHIPPED | OUTPATIENT
Start: 2022-11-04

## 2022-11-04 RX ORDER — CEFDINIR 300 MG/1
300 CAPSULE ORAL 2 TIMES DAILY
Qty: 20 CAPSULE | Refills: 0 | Status: SHIPPED | OUTPATIENT
Start: 2022-11-04 | End: 2022-12-15

## 2022-11-04 RX ORDER — AMLODIPINE BESYLATE 5 MG/1
7.5 TABLET ORAL DAILY
Qty: 45 TABLET | Refills: 6 | Status: SHIPPED | OUTPATIENT
Start: 2022-11-04 | End: 2023-03-16

## 2022-11-04 NOTE — PROGRESS NOTES
"Subjective   Rubén Rivas is a 87 y.o. male.     Chief Complaint   Patient presents with   • Urinary Tract Infection       History of Present Illness  He presents with c/o frequent urination that started last weekend. He went to the eye doctor on Monday and passed something through his urine. They called Dr. Diana's office who said it sounded like a kidney stone. He continues to pass things in his urine. He has had some blood in the urine. He passed kidney stones close to 50 years ago. He has had shaking and chilling. Urine is coming out in dribbles and drops. He states his back hurts so bad, he can hardly walk.        The following portions of the patient's history were reviewed and updated as appropriate: allergies, current medications, past family history, past medical history, past social history, past surgical history and problem list.    Review of Systems   Constitutional: Positive for chills. Negative for fever and unexpected weight change.   Respiratory: Negative for cough, shortness of breath and wheezing.    Cardiovascular: Negative for chest pain and palpitations.   Gastrointestinal: Negative for abdominal pain, blood in stool, constipation, diarrhea, nausea and vomiting.   Genitourinary: Positive for difficulty urinating, dysuria, frequency, hematuria, penile pain and urgency.   Musculoskeletal: Positive for back pain. Negative for arthralgias and myalgias.   Psychiatric/Behavioral: Negative for sleep disturbance and suicidal ideas. The patient is not nervous/anxious.        Objective     /60 (BP Location: Right arm, Patient Position: Sitting, Cuff Size: Adult)   Pulse 78   Temp 97.7 °F (36.5 °C) (Temporal)   Resp 18   Ht 177.8 cm (70\")   Wt 75.3 kg (166 lb)   SpO2 98%   BMI 23.82 kg/m²     Physical Exam  Vitals reviewed.   Constitutional:       General: He is not in acute distress.     Appearance: He is well-developed. He is not diaphoretic.   HENT:      Head: Normocephalic and atraumatic. "   Cardiovascular:      Rate and Rhythm: Normal rate and regular rhythm.      Heart sounds: Normal heart sounds. No murmur heard.    No friction rub. No gallop.   Pulmonary:      Effort: Pulmonary effort is normal. No respiratory distress.      Breath sounds: Normal breath sounds.   Abdominal:      General: Bowel sounds are normal. There is no distension.      Palpations: Abdomen is soft.      Tenderness: There is no abdominal tenderness. There is right CVA tenderness. There is no left CVA tenderness, guarding or rebound.   Skin:     General: Skin is warm and dry.   Neurological:      Mental Status: He is alert and oriented to person, place, and time.   Psychiatric:         Behavior: Behavior normal.         Thought Content: Thought content normal.         Judgment: Judgment normal.         Current Outpatient Medications   Medication Sig Dispense Refill   • amLODIPine (NORVASC) 5 MG tablet Take 1.5 tablets by mouth Daily. as directed 45 tablet 6   • aspirin 81 MG tablet Take  by mouth daily.     • Budesonide (ENTOCORT EC) 3 MG 24 hr capsule Take 6 mg by mouth Daily.     • clopidogrel (PLAVIX) 75 MG tablet Take 1 tablet by mouth Daily. 90 tablet 0   • colesevelam (WELCHOL) 625 MG tablet Take 625 mg by mouth 2 (Two) Times a Day.     • diphenhydrAMINE-acetaminophen (TYLENOL PM)  MG tablet per tablet Take 1 tablet by mouth At Night As Needed for sleep.     • diphenoxylate-atropine (Lomotil) 2.5-0.025 MG per tablet Take 1 tablet by mouth 4 (Four) Times a Day As Needed for Diarrhea. 20 tablet 0   • donepezil (Aricept) 23 MG tablet Take 1 tablet by mouth Every Night. 90 tablet 1   • escitalopram (LEXAPRO) 20 MG tablet Take 1 tablet by mouth Daily. 90 tablet 1   • esomeprazole (nexIUM) 40 MG capsule Take 1 capsule by mouth Every Morning Before Breakfast. 30 capsule 5   • FIBER PO Take  by mouth Daily.     • Glucose Blood (ONETOUCH ULTRA BLUE VI) daily.     • HYDROcodone-acetaminophen (Norco) 5-325 MG per tablet Take 1  tablet by mouth Every 8 (Eight) Hours As Needed for Moderate Pain. 90 tablet 0   • losartan (COZAAR) 25 MG tablet      • metoprolol tartrate (LOPRESSOR) 25 MG tablet Take 1 tablet by mouth 2 (Two) Times a Day. 180 tablet 1   • Multiple Vitamins-Minerals (ONE-A-DAY MENS 50+ ADVANTAGE PO) Take  by mouth daily.     • mupirocin (BACTROBAN) 2 % ointment Apply  topically to the appropriate area as directed 3 (Three) Times a Day. 15 g 1   • rosuvastatin (CRESTOR) 20 MG tablet Take 1 tablet by mouth Daily. 90 tablet 3   • tamsulosin (FLOMAX) 0.4 MG capsule 24 hr capsule Take 1 capsule by mouth Daily. 90 capsule 1   • vitamin B-12 (CYANOCOBALAMIN) 1000 MCG tablet Take 1,000 mcg by mouth Daily.     • cefdinir (OMNICEF) 300 MG capsule Take 1 capsule by mouth 2 (Two) Times a Day. 20 capsule 0     Current Facility-Administered Medications   Medication Dose Route Frequency Provider Last Rate Last Admin   • methylPREDNISolone sodium succinate (SOLU-Medrol) injection 80 mg  80 mg Intramuscular Once Laura Fulton DO                Assessment & Plan     Problem List Items Addressed This Visit        Cardiac and Vasculature    Essential hypertension (Chronic)    Relevant Medications    amLODIPine (NORVASC) 5 MG tablet       Gastrointestinal Abdominal     Gastroesophageal reflux disease without esophagitis (Chronic)    Relevant Medications    esomeprazole (nexIUM) 40 MG capsule   Other Visit Diagnoses     Nephrolithiasis    -  Primary    Relevant Orders    CT Abdomen Pelvis Stone Protocol    Gross hematuria        Relevant Orders    CBC & Differential (Completed)    Comprehensive Metabolic Panel (Completed)    Dysuria        Relevant Orders    CBC & Differential (Completed)    Comprehensive Metabolic Panel (Completed)    POC Urinalysis Dipstick, Automated (Completed)    Urine Culture - Urine, Urine, Clean Catch    Urinary tract infection with hematuria, site unspecified        Relevant Medications    cefdinir (OMNICEF) 300 MG  capsule    Other Relevant Orders    Urine Culture - Urine, Urine, Clean Catch            ICD-10-CM ICD-9-CM   1. Nephrolithiasis  N20.0 592.0   2. Essential hypertension  I10 401.9   3. Gross hematuria  R31.0 599.71   4. Dysuria  R30.0 788.1   5. Gastroesophageal reflux disease without esophagitis  K21.9 530.81   6. Urinary tract infection with hematuria, site unspecified  N39.0 599.0    R31.9 599.70       Plan: Get stat labs and ct renal stone protocol. Start cefdinir. Urinalysis has blood, protein, and leukocytes. Send for culture. Follow up pending results.     @Body mass index is 23.82 kg/m².           Understands disease processes and need for medications.  Understands reasons for urgent and emergent care.  Patient (& family) verbalized agreement for treatment plan.   Emotional support and active listening provided.  Patient provided time to verbalize feelings.           BMI is within normal parameters. No other follow-up for BMI required.      This document has been electronically signed by ARNEL Amos   November 4, 2022 15:46 EDT

## 2022-11-04 NOTE — PROGRESS NOTES
Ct reports as not showing any kidney stones or blockage. It does show a compression fracture at L1 that is new. Has he had any falls?

## 2022-11-06 LAB — BACTERIA SPEC AEROBE CULT: ABNORMAL

## 2022-11-07 NOTE — PROGRESS NOTES
We discussed ortho spine specialty referral and they do not really want to do that. What would you recommend? Apixaban/Eliquis is used to treat and prevent blood clots. If you are not able to swallow the tablets whole, they may be crushed and mixed in water, apple juice, or applesauce and promptly taken within four hours. Never skip a dose of Apixaban/Eliquis. If you forget to take your Apixaban/Eliquis, take a dose as soon as you remember. If it is almost time for your next Apixaban/Eliquis dose, wait until then and take a regular dose. DO NOT take an extra pill to ‘catch up’.  NEVER TAKE A DOUBLE DOSE. Notify your doctor that you missed a dose. Take Apixaban/Eliquis at the same time each morning and evening. Apixaban/Eliquis may be taken with other medication or food.

## 2022-11-09 ENCOUNTER — OFFICE VISIT (OUTPATIENT)
Dept: FAMILY MEDICINE CLINIC | Facility: CLINIC | Age: 87
End: 2022-11-09

## 2022-11-09 VITALS
WEIGHT: 163.8 LBS | HEART RATE: 64 BPM | SYSTOLIC BLOOD PRESSURE: 138 MMHG | BODY MASS INDEX: 23.5 KG/M2 | DIASTOLIC BLOOD PRESSURE: 64 MMHG | OXYGEN SATURATION: 100 % | TEMPERATURE: 98.2 F

## 2022-11-09 DIAGNOSIS — M48.062 SPINAL STENOSIS OF LUMBAR REGION WITH NEUROGENIC CLAUDICATION: Chronic | ICD-10-CM

## 2022-11-09 DIAGNOSIS — S32.010A COMPRESSION FRACTURE OF L1 VERTEBRA, INITIAL ENCOUNTER: Primary | ICD-10-CM

## 2022-11-09 DIAGNOSIS — Z51.81 MEDICATION MONITORING ENCOUNTER: ICD-10-CM

## 2022-11-09 DIAGNOSIS — N30.01 ACUTE CYSTITIS WITH HEMATURIA: ICD-10-CM

## 2022-11-09 DIAGNOSIS — M51.36 DDD (DEGENERATIVE DISC DISEASE), LUMBAR: Chronic | ICD-10-CM

## 2022-11-09 LAB
AMPHET+METHAMPHET UR QL: NEGATIVE
AMPHETAMINES UR QL: NEGATIVE
BARBITURATES UR QL SCN: NEGATIVE
BENZODIAZ UR QL SCN: NEGATIVE
BILIRUB BLD-MCNC: NEGATIVE MG/DL
BUPRENORPHINE SERPL-MCNC: NEGATIVE NG/ML
CANNABINOIDS SERPL QL: NEGATIVE
CLARITY, POC: CLEAR
COCAINE UR QL: NEGATIVE
COLOR UR: YELLOW
EXPIRATION DATE: ABNORMAL
GLUCOSE UR STRIP-MCNC: NEGATIVE MG/DL
KETONES UR QL: NEGATIVE
LEUKOCYTE EST, POC: NEGATIVE
Lab: ABNORMAL
METHADONE UR QL SCN: NEGATIVE
NITRITE UR-MCNC: NEGATIVE MG/ML
OPIATES UR QL: POSITIVE
OXYCODONE UR QL SCN: NEGATIVE
PCP UR QL SCN: NEGATIVE
PH UR: 6 [PH] (ref 5–8)
PROPOXYPH UR QL: NEGATIVE
PROT UR STRIP-MCNC: ABNORMAL MG/DL
RBC # UR STRIP: ABNORMAL /UL
SP GR UR: 1.02 (ref 1–1.03)
TRICYCLICS UR QL SCN: NEGATIVE
UROBILINOGEN UR QL: NORMAL

## 2022-11-09 PROCEDURE — 80306 DRUG TEST PRSMV INSTRMNT: CPT | Performed by: INTERNAL MEDICINE

## 2022-11-09 PROCEDURE — 99214 OFFICE O/P EST MOD 30 MIN: CPT | Performed by: INTERNAL MEDICINE

## 2022-11-09 PROCEDURE — 81003 URINALYSIS AUTO W/O SCOPE: CPT | Performed by: INTERNAL MEDICINE

## 2022-11-09 RX ORDER — HYDROCODONE BITARTRATE AND ACETAMINOPHEN 5; 325 MG/1; MG/1
1 TABLET ORAL EVERY 8 HOURS PRN
Qty: 90 TABLET | Refills: 0 | Status: SHIPPED | OUTPATIENT
Start: 2022-11-09 | End: 2022-11-23 | Stop reason: SDUPTHER

## 2022-11-09 RX ORDER — TIZANIDINE 2 MG/1
2 TABLET ORAL 2 TIMES DAILY PRN
Qty: 60 TABLET | Refills: 2 | Status: SHIPPED | OUTPATIENT
Start: 2022-11-09 | End: 2023-02-09

## 2022-11-09 NOTE — PROGRESS NOTES
Patient Name: Rubén Rivas Today's Date: 2022   Patient MRN / CSN: [id number] / [id number] Date of Encounter: 2022   Patient Age / : 87 y.o. / 1935 Encounter Provider: Laura Fulton DO   Referring Physician: No ref. provider found          Rubén is a 87 y.o. male who is being seen today for Results and Follow-up      History of Present Illness     Mr. Rivas presents today to follow-up after having recent UTI.  There were concerns of kidney stones and CT scan stone protocol was done.  There was no hydronephrosis or renal lithiasis seen on the CT scan but there was a new compression fracture at L1 seen.  Patient reports having a recent fall and worsened back pain since that fall.  He believes this is when the compression fracture developed.  He denies radiation of the pain into his lower extremities.  He denies lower extremity numbness or weakness.  He has been wearing a lumbar back brace and using a heating pad with some relief of his lumbar back pain.  He is also been taking tramadol as needed with some relief.  He still reports having pain as severe as 8/10 at times with certain positions.  He has never tried a muscle relaxer but would be interested in trying this.  He is not interested in surgical options for management of this compression fracture.    Allergies include:Sulfa antibiotics  Current Outpatient Medications   Medication Sig Dispense Refill   • amLODIPine (NORVASC) 5 MG tablet Take 1.5 tablets by mouth Daily. as directed 45 tablet 6   • aspirin 81 MG tablet Take  by mouth daily.     • Budesonide (ENTOCORT EC) 3 MG 24 hr capsule Take 6 mg by mouth Daily.     • cefdinir (OMNICEF) 300 MG capsule Take 1 capsule by mouth 2 (Two) Times a Day. 20 capsule 0   • clopidogrel (PLAVIX) 75 MG tablet Take 1 tablet by mouth Daily. 90 tablet 0   • colesevelam (WELCHOL) 625 MG tablet Take 625 mg by mouth 2 (Two) Times a Day.     • diphenhydrAMINE-acetaminophen (TYLENOL PM)  MG tablet per tablet  Take 1 tablet by mouth At Night As Needed for sleep.     • diphenoxylate-atropine (Lomotil) 2.5-0.025 MG per tablet Take 1 tablet by mouth 4 (Four) Times a Day As Needed for Diarrhea. 20 tablet 0   • donepezil (Aricept) 23 MG tablet Take 1 tablet by mouth Every Night. 90 tablet 1   • escitalopram (LEXAPRO) 20 MG tablet Take 1 tablet by mouth Daily. 90 tablet 1   • esomeprazole (nexIUM) 40 MG capsule Take 1 capsule by mouth Every Morning Before Breakfast. 30 capsule 5   • FIBER PO Take  by mouth Daily.     • Glucose Blood (ONETOUCH ULTRA BLUE VI) daily.     • HYDROcodone-acetaminophen (Norco) 5-325 MG per tablet Take 1 tablet by mouth Every 8 (Eight) Hours As Needed for Moderate Pain. 90 tablet 0   • losartan (COZAAR) 25 MG tablet      • metoprolol tartrate (LOPRESSOR) 25 MG tablet Take 1 tablet by mouth 2 (Two) Times a Day. 180 tablet 1   • Multiple Vitamins-Minerals (ONE-A-DAY MENS 50+ ADVANTAGE PO) Take  by mouth daily.     • mupirocin (BACTROBAN) 2 % ointment Apply  topically to the appropriate area as directed 3 (Three) Times a Day. 15 g 1   • rosuvastatin (CRESTOR) 20 MG tablet Take 1 tablet by mouth Daily. 90 tablet 3   • vitamin B-12 (CYANOCOBALAMIN) 1000 MCG tablet Take 1,000 mcg by mouth Daily.     • tamsulosin (FLOMAX) 0.4 MG capsule 24 hr capsule Take 1 capsule by mouth Daily. 90 capsule 1   • tiZANidine (ZANAFLEX) 2 MG tablet Take 1 tablet by mouth 2 (Two) Times a Day As Needed for Muscle Spasms. 60 tablet 2     Current Facility-Administered Medications   Medication Dose Route Frequency Provider Last Rate Last Admin   • methylPREDNISolone sodium succinate (SOLU-Medrol) injection 80 mg  80 mg Intramuscular Once Laura Fulton DO         Past Medical History:   Diagnosis Date   • Anemia    • Arthritis    • Depression    • Hyperlipidemia    • Hypertension    • Prostate cancer (HCC) 2013   • Stage 3 chronic kidney disease (HCC) 1/4/2021     Family History   Problem Relation Age of Onset   • Cancer Mother  35        breast   • Heart attack Father    • Cancer Sister         cervical   • Heart attack Brother    • Heart disease Brother    • Cancer Brother         Prostate     Past Surgical History:   Procedure Laterality Date   • CARDIAC SURGERY     • COLONOSCOPY     • HERNIA REPAIR     • LAPAROSCOPIC CHOLECYSTECTOMY  2020    PERFORMED AT [facility]   • STOMACH SURGERY       Social History     Substance and Sexual Activity   Alcohol Use No     Social History     Tobacco Use   Smoking Status Former   • Packs/day: 0.25   • Years: 15.00   • Pack years: 3.75   • Types: Cigarettes   • Quit date: 1976   • Years since quittin.3   Smokeless Tobacco Never     Social History     Substance and Sexual Activity   Drug Use No     Review of Systems   Respiratory: Negative for shortness of breath.    Gastrointestinal: Negative for blood in stool.   Genitourinary: Negative for hematuria.        Patient reports responding well to omnicef for UTI.           Depression Assessment Review:  PHQ-9 Total Score:    Vital Signs & Measurements Taken This Encounter  /64 (BP Location: Left arm, Patient Position: Sitting, Cuff Size: Adult)   Pulse 64   Temp 98.2 °F (36.8 °C) (Temporal)   Wt 74.3 kg (163 lb 12.8 oz)   SpO2 100%   BMI 23.50 kg/m²    SpO2 Percentage    22 1007   SpO2: 100%        BMI is within normal parameters. No other follow-up for BMI required.      Physical Exam  Vitals reviewed.   Constitutional:       General: He is not in acute distress.  HENT:      Head: Normocephalic and atraumatic.   Eyes:      General: No scleral icterus.     Extraocular Movements: Extraocular movements intact.      Conjunctiva/sclera: Conjunctivae normal.      Pupils: Pupils are equal, round, and reactive to light.   Cardiovascular:      Rate and Rhythm: Normal rate and regular rhythm.   Pulmonary:      Effort: Pulmonary effort is normal. No respiratory distress.      Breath sounds: Normal breath sounds.    Musculoskeletal:         General: No swelling.      Comments: 5/5 muscle strength demonstrated in the lower extremities and knee flexion and extension, foot plantar and dorsi flexion.   Skin:     General: Skin is warm and dry.      Coloration: Skin is not jaundiced.   Neurological:      Mental Status: He is alert.   Psychiatric:         Mood and Affect: Mood normal.         Behavior: Behavior normal.          Fall Risk Assessment:  Fall Risk Assessment was completed, and patient is at high risk for falls. I encouraged patient to use rollator or at least cane. He reports doing well with a cane.     Assessment & Plan  Patient Active Problem List   Diagnosis   • Anemia   • Chronic coronary artery disease   • Chronic osteoarthritis   • Depression   • Elevated prostate specific antigen (PSA)   • Enlarged prostate   • Hyperlipidemia   • Essential hypertension   • Iron deficiency   • Low back pain   • Malignant neoplasm of prostate (HCC)   • Prostate cancer (HCC)   • Mild cognitive impairment with memory loss   • Stage 3 chronic kidney disease (HCC)   • Spinal stenosis of lumbar region   • DDD (degenerative disc disease), lumbar   • Diverticulosis   • Gastroesophageal reflux disease without esophagitis       ICD-10-CM ICD-9-CM   1. Compression fracture of L1 vertebra, initial encounter ([facility])  S32.010A 805.4   2. Spinal stenosis of lumbar region with neurogenic claudication  M48.062 724.03   3. DDD (degenerative disc disease), lumbar  M51.36 722.52   4. Medication monitoring encounter  Z51.81 V58.83   5. Acute cystitis with hematuria  N30.01 595.0     Orders Placed This Encounter   Procedures   • Urine Drug Screen - Urine, Clean Catch   • POC Urinalysis Dipstick, Automated       Meds Ordered During Visit:  New Medications Ordered This Visit   Medications   • tiZANidine (ZANAFLEX) 2 MG tablet     Sig: Take 1 tablet by mouth 2 (Two) Times a Day As Needed for Muscle Spasms.     Dispense:  60 tablet     Refill:  2   •  HYDROcodone-acetaminophen (Norco) 5-325 MG per tablet     Sig: Take 1 tablet by mouth Every 8 (Eight) Hours As Needed for Moderate Pain.     Dispense:  90 tablet     Refill:  0     I reviewed CT scan images and report with patient and his spouse today.  I recommended wearing the lumbar brace with activity.  He may also use a heating pad as needed and intervals as he is doing.  I encouraged him to ambulate with use of the Rollator or cane for fall prevention.  We will continue hydrocodone.  I also discussed Zanaflex to use as needed for muscle spasms.  We will start at a very low-dose of this and I cautioned patient that this may cause drowsiness.  Urine analysis today showed improvement with no leukocytes or nitrites.  Patient did still have 3+ blood on this UA.  I encouraged him to finish Omnicef as previously prescribed.  We will update UDS today.  I reviewed PDMP as well.    Return in about 1 month (around 12/9/2022), or if symptoms worsen or fail to improve, for Recheck.          Referring Provider (if known): No ref. provider found      This document has been electronically signed by Laura Fulton DO  November 9, 2022 12:23 EST    Laura Fulton DO, FACOI  [address] [place] [address]  [phone] (office)    Part of this note may be an electronic transcription/translation of spoken language to printed text using the Dragon Dictation System.

## 2022-11-23 ENCOUNTER — TELEPHONE (OUTPATIENT)
Dept: FAMILY MEDICINE CLINIC | Facility: CLINIC | Age: 87
End: 2022-11-23

## 2022-11-23 DIAGNOSIS — S32.010A COMPRESSION FRACTURE OF L1 VERTEBRA, INITIAL ENCOUNTER: ICD-10-CM

## 2022-11-23 DIAGNOSIS — M51.36 DDD (DEGENERATIVE DISC DISEASE), LUMBAR: Chronic | ICD-10-CM

## 2022-11-23 DIAGNOSIS — M48.062 SPINAL STENOSIS OF LUMBAR REGION WITH NEUROGENIC CLAUDICATION: Chronic | ICD-10-CM

## 2022-11-23 RX ORDER — HYDROCODONE BITARTRATE AND ACETAMINOPHEN 5; 325 MG/1; MG/1
1 TABLET ORAL EVERY 8 HOURS PRN
Qty: 90 TABLET | Refills: 0 | Status: SHIPPED | OUTPATIENT
Start: 2022-11-23 | End: 2022-12-15 | Stop reason: SDUPTHER

## 2022-11-23 NOTE — TELEPHONE ENCOUNTER
Called pt emergency contact Aleksandra. She requests refill for Norco, states he still has some left but didn't want him to run out with holiday coming up and us being out of office.

## 2022-11-23 NOTE — TELEPHONE ENCOUNTER
Caller: Natalia Brown    Relationship: Emergency Contact    Best call back number: [phone]    What is the best time to reach you: ANYTIME, OK TO LEAVE VOICEMAIL.    Who are you requesting to speak with (clinical staff, provider,  specific staff member): CLINICAL STAFF, YAZAN.    Do you know the name of the person who called: NATALIA, PATIENT'S DAUGHTER    What was the call regarding: PATIENT'S DAUGHTER CALLING TO REQUEST A CALL BACK FOR A MEDICATION CONCERN FOR HER DAD. PLEASE ADVISE.    Do you require a callback: YES

## 2022-12-14 RX ORDER — CLOPIDOGREL BISULFATE 75 MG/1
75 TABLET ORAL DAILY
Qty: 90 TABLET | Refills: 1 | Status: SHIPPED | OUTPATIENT
Start: 2022-12-14

## 2022-12-15 ENCOUNTER — OFFICE VISIT (OUTPATIENT)
Dept: FAMILY MEDICINE CLINIC | Facility: CLINIC | Age: 87
End: 2022-12-15

## 2022-12-15 VITALS
OXYGEN SATURATION: 99 % | HEART RATE: 66 BPM | WEIGHT: 166.6 LBS | DIASTOLIC BLOOD PRESSURE: 50 MMHG | SYSTOLIC BLOOD PRESSURE: 122 MMHG | BODY MASS INDEX: 23.9 KG/M2 | TEMPERATURE: 97.5 F

## 2022-12-15 DIAGNOSIS — M48.062 SPINAL STENOSIS OF LUMBAR REGION WITH NEUROGENIC CLAUDICATION: Chronic | ICD-10-CM

## 2022-12-15 DIAGNOSIS — M51.36 DDD (DEGENERATIVE DISC DISEASE), LUMBAR: Chronic | ICD-10-CM

## 2022-12-15 DIAGNOSIS — M48.56XD NON-TRAUMATIC COMPRESSION FRACTURE OF L1 LUMBAR VERTEBRA WITH ROUTINE HEALING, SUBSEQUENT ENCOUNTER: Primary | Chronic | ICD-10-CM

## 2022-12-15 DIAGNOSIS — S32.010A COMPRESSION FRACTURE OF L1 VERTEBRA, INITIAL ENCOUNTER: ICD-10-CM

## 2022-12-15 DIAGNOSIS — H61.21 IMPACTED CERUMEN OF RIGHT EAR: ICD-10-CM

## 2022-12-15 PROBLEM — M48.56XA: Chronic | Status: ACTIVE | Noted: 2022-12-15

## 2022-12-15 PROCEDURE — 99214 OFFICE O/P EST MOD 30 MIN: CPT | Performed by: INTERNAL MEDICINE

## 2022-12-15 RX ORDER — HYDROCODONE BITARTRATE AND ACETAMINOPHEN 5; 325 MG/1; MG/1
1 TABLET ORAL EVERY 8 HOURS PRN
Qty: 90 TABLET | Refills: 0 | Status: SHIPPED | OUTPATIENT
Start: 2022-12-15 | End: 2023-02-15 | Stop reason: SDUPTHER

## 2022-12-15 NOTE — PROGRESS NOTES
Patient Name: Rubén Rivas Today's Date: 12/15/2022   Patient MRN / CSN: [id number] / [id number] Date of Encounter: 12/15/2022   Patient Age / : 87 y.o. / 1935 Encounter Provider: Laura Fulton DO   Referring Physician: No ref. provider found          Rubén is a 87 y.o. male who is being seen today for Follow-up      Back Pain  This is a chronic problem. The problem has been gradually improving since onset. The pain is present in the lumbar spine. The pain is moderate. The symptoms are aggravated by standing. Pertinent negatives include no abdominal pain, chest pain or fever. He has tried analgesics and muscle relaxant for the symptoms. The treatment provided significant relief.   Earache   There is pain in the right ear. This is a recurrent problem. The problem has been gradually worsening. Associated symptoms include hearing loss. Pertinent negatives include no abdominal pain. His past medical history is significant for hearing loss. History of cerumen impactions       Allergies include:Sulfa antibiotics  Current Outpatient Medications   Medication Sig Dispense Refill   • amLODIPine (NORVASC) 5 MG tablet Take 1.5 tablets by mouth Daily. as directed 45 tablet 6   • aspirin 81 MG tablet Take  by mouth daily.     • Budesonide (ENTOCORT EC) 3 MG 24 hr capsule Take 6 mg by mouth Daily.     • clopidogrel (PLAVIX) 75 MG tablet TAKE 1 TABLET BY MOUTH DAILY. 90 tablet 1   • colesevelam (WELCHOL) 625 MG tablet Take 625 mg by mouth 2 (Two) Times a Day.     • diphenhydrAMINE-acetaminophen (TYLENOL PM)  MG tablet per tablet Take 1 tablet by mouth At Night As Needed for sleep.     • diphenoxylate-atropine (Lomotil) 2.5-0.025 MG per tablet Take 1 tablet by mouth 4 (Four) Times a Day As Needed for Diarrhea. 20 tablet 0   • donepezil (Aricept) 23 MG tablet Take 1 tablet by mouth Every Night. 90 tablet 1   • escitalopram (LEXAPRO) 20 MG tablet Take 1 tablet by mouth Daily. 90 tablet 1   • esomeprazole (nexIUM) 40  MG capsule Take 1 capsule by mouth Every Morning Before Breakfast. 30 capsule 5   • FIBER PO Take  by mouth Daily.     • Glucose Blood (ONETOUCH ULTRA BLUE VI) daily.     • HYDROcodone-acetaminophen (Norco) 5-325 MG per tablet Take 1 tablet by mouth Every 8 (Eight) Hours As Needed for Moderate Pain. 90 tablet 0   • losartan (COZAAR) 25 MG tablet      • metoprolol tartrate (LOPRESSOR) 25 MG tablet Take 1 tablet by mouth 2 (Two) Times a Day. 180 tablet 1   • Multiple Vitamins-Minerals (ONE-A-DAY MENS 50+ ADVANTAGE PO) Take  by mouth daily.     • mupirocin (BACTROBAN) 2 % ointment Apply  topically to the appropriate area as directed 3 (Three) Times a Day. 15 g 1   • rosuvastatin (CRESTOR) 20 MG tablet Take 1 tablet by mouth Daily. 90 tablet 3   • tamsulosin (FLOMAX) 0.4 MG capsule 24 hr capsule Take 1 capsule by mouth Daily. 90 capsule 1   • tiZANidine (ZANAFLEX) 2 MG tablet Take 1 tablet by mouth 2 (Two) Times a Day As Needed for Muscle Spasms. 60 tablet 2   • vitamin B-12 (CYANOCOBALAMIN) 1000 MCG tablet Take 1,000 mcg by mouth Daily.     • cefdinir (OMNICEF) 300 MG capsule Take 1 capsule by mouth 2 (Two) Times a Day. 20 capsule 0     Current Facility-Administered Medications   Medication Dose Route Frequency Provider Last Rate Last Admin   • methylPREDNISolone sodium succinate (SOLU-Medrol) injection 80 mg  80 mg Intramuscular Once Laura Fulton DO         Past Medical History:   Diagnosis Date   • Anemia    • Arthritis    • Depression    • Hyperlipidemia    • Hypertension    • Prostate cancer (HCC) 2013   • Stage 3 chronic kidney disease (HCC) 1/4/2021     Family History   Problem Relation Age of Onset   • Cancer Mother 35        breast   • Heart attack Father    • Cancer Sister         cervical   • Heart attack Brother    • Heart disease Brother    • Cancer Brother         Prostate     Past Surgical History:   Procedure Laterality Date   • CARDIAC SURGERY     • COLONOSCOPY     • HERNIA REPAIR     •  LAPAROSCOPIC CHOLECYSTECTOMY  2020    PERFORMED AT [facility]   • STOMACH SURGERY       Social History     Substance and Sexual Activity   Alcohol Use No     Social History     Tobacco Use   Smoking Status Former   • Packs/day: 0.25   • Years: 15.00   • Pack years: 3.75   • Types: Cigarettes   • Quit date: 1976   • Years since quittin.4   Smokeless Tobacco Never     Social History     Substance and Sexual Activity   Drug Use No     Review of Systems   Constitutional: Negative for fever.   HENT: Positive for ear pain and hearing loss.         Right ear pain and loss of hearing in this ear. Patient believes he has impacted cerumen.    Respiratory: Negative for shortness of breath.    Cardiovascular: Negative for chest pain.   Gastrointestinal: Negative for abdominal pain and blood in stool.   Genitourinary: Negative for hematuria.   Musculoskeletal: Positive for back pain.        Depression Assessment Review:  PHQ-9 Total Score:    Vital Signs & Measurements Taken This Encounter  /50 (BP Location: Right arm, Patient Position: Sitting, Cuff Size: Adult)   Pulse 66   Temp 97.5 °F (36.4 °C) (Temporal)   Wt 75.6 kg (166 lb 9.6 oz)   SpO2 99%   BMI 23.90 kg/m²    SpO2 Percentage    12/15/22 1043   SpO2: 99%        BMI is within normal parameters. No other follow-up for BMI required.      Physical Exam  Vitals reviewed.   Constitutional:       General: He is not in acute distress.  HENT:      Head: Normocephalic and atraumatic.      Right Ear: There is impacted cerumen.      Left Ear: Tympanic membrane normal.   Eyes:      General: No scleral icterus.     Extraocular Movements: Extraocular movements intact.      Conjunctiva/sclera: Conjunctivae normal.      Pupils: Pupils are equal, round, and reactive to light.   Cardiovascular:      Rate and Rhythm: Normal rate and regular rhythm.   Pulmonary:      Effort: Pulmonary effort is normal. No respiratory distress.      Breath sounds: Normal breath  sounds.   Musculoskeletal:         General: No swelling.      Cervical back: Neck supple. No tenderness.   Lymphadenopathy:      Cervical: No cervical adenopathy.   Skin:     General: Skin is warm and dry.      Coloration: Skin is not jaundiced.   Neurological:      Mental Status: He is alert.   Psychiatric:         Mood and Affect: Mood normal.         Behavior: Behavior normal.     Procedure   Ear Cerumen Removal    Date/Time: 12/15/2022 12:45 PM  Performed by: Laura Fulton DO  Authorized by: Laura Fulton DO   Consent: Verbal consent obtained.  Consent given by: patient  Patient understanding: patient states understanding of the procedure being performed  Patient consent: the patient's understanding of the procedure matches consent given  Patient identity confirmed: verbally with patient    Anesthesia:  Local Anesthetic: none  Location details: right ear  Patient tolerance: patient tolerated the procedure well with no immediate complications  Comments: I used a lighted curette to remove cerumen from right ear canal.  A moderate amount of cerumen was removed with a little cerumen still present in canal but difficult to obtain.    Procedure type: instrumentation, curette (Right ear lavage was performed by Esperanza Nur RN and partially successful in removing cerumen)                  Assessment & Plan  Patient Active Problem List   Diagnosis   • Anemia   • Chronic coronary artery disease   • Chronic osteoarthritis   • Depression   • Elevated prostate specific antigen (PSA)   • Enlarged prostate   • Hyperlipidemia   • Essential hypertension   • Iron deficiency   • Low back pain   • Malignant neoplasm of prostate (HCC)   • Prostate cancer (HCC)   • Mild cognitive impairment with memory loss   • Stage 3 chronic kidney disease (HCC)   • Spinal stenosis of lumbar region   • DDD (degenerative disc disease), lumbar   • Diverticulosis   • Gastroesophageal reflux disease without esophagitis   • Nontraumatic  compression fracture of L1 vertebra ([facility])       ICD-10-CM ICD-9-CM   1. Non-traumatic compression fracture of L1 lumbar vertebra with routine healing, subsequent encounter  M48.56XD V54.27   2. Spinal stenosis of lumbar region with neurogenic claudication  M48.062 724.03   3. Compression fracture of L1 vertebra, initial encounter ([facility])  S32.010A 805.4   4. DDD (degenerative disc disease), lumbar  M51.36 722.52   5. Impacted cerumen of right ear  H61.21 380.4     Orders Placed This Encounter   Procedures   • Ear Cerumen Removal     Order Specific Question:   Release to patient     Answer:   Routine Release   • XR Spine Lumbar 2 or 3 View     Standing Status:   Future     Standing Expiration Date:   12/15/2023     Order Specific Question:   Reason for Exam:     Answer:   nontraumatic compression fracture   • DEXA Bone Density Axial     Standing Status:   Future     Standing Expiration Date:   12/15/2023       Meds Ordered During Visit:  New Medications Ordered This Visit   Medications   • HYDROcodone-acetaminophen (Norco) 5-325 MG per tablet     Sig: Take 1 tablet by mouth Every 8 (Eight) Hours As Needed for Moderate Pain.     Dispense:  90 tablet     Refill:  0     Keep on file and fill when due.       Cerumen removal was performed as above.  I also reviewed PDMP today and updated hydrocodone prescription.  I recommended patient update DEXA and lumbar spine x-rays prior to his next appointment.  I encouraged him to continue to avoid lifting anything more than a gallon of milk at this time.  He may continue ambulating and other activities as tolerated.    Return in about 3 months (around 3/15/2023), or if symptoms worsen or fail to improve, for Recheck.          Referring Provider (if known): No ref. provider found      This document has been electronically signed by Laura Fulton DO  December 15, 2022 12:49 EST    Laura Fulton DO, FACOI  [address] [place] [address]  [phone]  (office)    Part of this note may be an electronic transcription/translation of spoken language to printed text using the Dragon Dictation System.

## 2022-12-22 ENCOUNTER — TELEPHONE (OUTPATIENT)
Dept: FAMILY MEDICINE CLINIC | Facility: CLINIC | Age: 87
End: 2022-12-22

## 2022-12-22 NOTE — TELEPHONE ENCOUNTER
.    Pharmacy Name:  [facility]    Pharmacy representative name: EVI    Pharmacy representative phone number: [phone]    What medication are you calling in regards to: HYDROCODONE     What question does the pharmacy have: EVI IS WANTING TO KNOW IF THE ABOVE MEDICATION COULD BE FILLED TODAY.  IT IS NOT DUE UNTIL Saturday, BUT THEY WILL BE CLOSED.    Who is the provider that prescribed the medication: CHANDLER

## 2023-01-01 ENCOUNTER — CLINICAL SUPPORT (OUTPATIENT)
Dept: FAMILY MEDICINE CLINIC | Facility: CLINIC | Age: 88
End: 2023-01-01
Payer: MEDICARE

## 2023-01-01 DIAGNOSIS — K92.1 BLOOD IN STOOL: Primary | ICD-10-CM

## 2023-01-01 LAB
EXPIRATION DATE 2: ABNORMAL
EXPIRATION DATE 3: ABNORMAL
EXPIRATION DATE: ABNORMAL
GASTROCULT GAST QL: NEGATIVE
HEMOCCULT SP2 STL QL: NEGATIVE
HEMOCCULT SP3 STL QL: POSITIVE
Lab: ABNORMAL

## 2023-01-01 PROCEDURE — 82274 ASSAY TEST FOR BLOOD FECAL: CPT | Performed by: INTERNAL MEDICINE

## 2023-01-12 DIAGNOSIS — M48.56XA COLLAPSED VERTEBRA, NOT ELSEWHERE CLASSIFIED, LUMBAR REGION, INITIAL ENCOUNTER FOR FRACTURE: Primary | ICD-10-CM

## 2023-01-27 ENCOUNTER — TELEPHONE (OUTPATIENT)
Dept: FAMILY MEDICINE CLINIC | Facility: CLINIC | Age: 88
End: 2023-01-27
Payer: MEDICARE

## 2023-01-27 DIAGNOSIS — E78.2 MIXED HYPERLIPIDEMIA: ICD-10-CM

## 2023-01-27 DIAGNOSIS — G31.84 MILD COGNITIVE IMPAIRMENT WITH MEMORY LOSS: Chronic | ICD-10-CM

## 2023-01-27 RX ORDER — ROSUVASTATIN CALCIUM 20 MG/1
20 TABLET, COATED ORAL DAILY
Qty: 90 TABLET | Refills: 3 | Status: SHIPPED | OUTPATIENT
Start: 2023-01-27

## 2023-01-27 NOTE — TELEPHONE ENCOUNTER
Called [phone] spoke with Aleksandra notified rosuvastatin was sent to pharmacy explained we were working on Aricept alternative dn would notify her as soon as we had an answer she verbalized understanding

## 2023-01-27 NOTE — TELEPHONE ENCOUNTER
Called [phone] spoke with Aleksandra whitaker rosuvastatin refilled and the Aricept has went up to 300 dollars now and was wanting to get something to replace it.     Send medication to [facility].

## 2023-01-27 NOTE — TELEPHONE ENCOUNTER
Caller: Aleksandra Brown    Relationship: Emergency Contact    Best call back number:[phone]     Who are you requesting to speak with (clinical staff, provider,  specific staff member): YAZAN OR LINDA    What was the call regarding: THE PATIENT'S DAUGHTER CALLED TO DISCUSS THE PATIENT'S MEDICATION.      Do you require a callback: YES

## 2023-01-27 NOTE — TELEPHONE ENCOUNTER
I sent in the rosuvastatin. I will forward this on to Dr. Fulton to see if she has a suggestion for an alternative to maybe more cost friendly.

## 2023-01-31 NOTE — TELEPHONE ENCOUNTER
Please call pharmacy and see what is covered in place of donepezil. That is a generic so it should be more cost efficient.

## 2023-02-01 NOTE — TELEPHONE ENCOUNTER
Aleksandra called me back to let me know they would still like to try something else due to cost. As previously noted, the pharmacy told me they could not give me a less expensive alternative. I did call twice to request an alternative option.    If you send in a new prescription, I will call to check the price for them.

## 2023-02-01 NOTE — TELEPHONE ENCOUNTER
I called [facility] pharmacy and she was unable to give me the price of alternatives but she did find a coupon for $84 for a 90 day supply through [facility]. I shared this information with the pt daughter Aleksandra and she said that she would get back to me.

## 2023-02-02 ENCOUNTER — HOSPITAL ENCOUNTER (OUTPATIENT)
Dept: BONE DENSITY | Facility: HOSPITAL | Age: 88
Discharge: HOME | End: 2023-02-02
Payer: MEDICARE

## 2023-02-02 ENCOUNTER — HOSPITAL ENCOUNTER (OUTPATIENT)
Dept: GENERAL RADIOLOGY | Facility: HOSPITAL | Age: 88
Discharge: HOME | End: 2023-02-02
Payer: MEDICARE

## 2023-02-02 DIAGNOSIS — M48.56XD NON-TRAUMATIC COMPRESSION FRACTURE OF L1 LUMBAR VERTEBRA WITH ROUTINE HEALING, SUBSEQUENT ENCOUNTER: Chronic | ICD-10-CM

## 2023-02-02 DIAGNOSIS — M48.56XA COLLAPSED VERTEBRA, NOT ELSEWHERE CLASSIFIED, LUMBAR REGION, INITIAL ENCOUNTER FOR FRACTURE: ICD-10-CM

## 2023-02-02 PROCEDURE — 72100 X-RAY EXAM L-S SPINE 2/3 VWS: CPT

## 2023-02-02 PROCEDURE — 77080 DXA BONE DENSITY AXIAL: CPT | Performed by: RADIOLOGY

## 2023-02-02 PROCEDURE — 77080 DXA BONE DENSITY AXIAL: CPT

## 2023-02-02 PROCEDURE — 72100 X-RAY EXAM L-S SPINE 2/3 VWS: CPT | Performed by: RADIOLOGY

## 2023-02-02 RX ORDER — DONEPEZIL HYDROCHLORIDE 10 MG/1
TABLET, FILM COATED ORAL
Qty: 180 TABLET | Refills: 3 | Status: SHIPPED | OUTPATIENT
Start: 2023-02-02

## 2023-02-02 NOTE — TELEPHONE ENCOUNTER
Tried to call pt/wife to update on medication change and prices. Unable to reach. Left voicemail  to return call regarding this.

## 2023-02-02 NOTE — TELEPHONE ENCOUNTER
I spoke with pt wife, Risa. She would like to try the adjusted dose per pharmacist recommendation of 20 mg (two 10mg tablets). As this was reported to be $48/3 month supply and $18/ 1 month supply before insurance.

## 2023-02-03 DIAGNOSIS — F32.4 MAJOR DEPRESSIVE DISORDER WITH SINGLE EPISODE, IN PARTIAL REMISSION: ICD-10-CM

## 2023-02-06 RX ORDER — ESCITALOPRAM OXALATE 20 MG/1
TABLET ORAL
Qty: 90 TABLET | Refills: 3 | Status: SHIPPED | OUTPATIENT
Start: 2023-02-06

## 2023-02-09 DIAGNOSIS — S32.010A COMPRESSION FRACTURE OF L1 VERTEBRA, INITIAL ENCOUNTER: ICD-10-CM

## 2023-02-09 DIAGNOSIS — C61 PROSTATE CANCER: ICD-10-CM

## 2023-02-09 DIAGNOSIS — N40.0 BPH WITHOUT OBSTRUCTION/LOWER URINARY TRACT SYMPTOMS: ICD-10-CM

## 2023-02-09 RX ORDER — TAMSULOSIN HYDROCHLORIDE 0.4 MG/1
1 CAPSULE ORAL DAILY
Qty: 90 CAPSULE | Refills: 1 | Status: SHIPPED | OUTPATIENT
Start: 2023-02-09

## 2023-02-09 RX ORDER — TIZANIDINE 2 MG/1
2 TABLET ORAL 2 TIMES DAILY PRN
Qty: 60 TABLET | Refills: 2 | Status: SHIPPED | OUTPATIENT
Start: 2023-02-09

## 2023-02-15 ENCOUNTER — OFFICE VISIT (OUTPATIENT)
Dept: FAMILY MEDICINE CLINIC | Facility: CLINIC | Age: 88
End: 2023-02-15
Payer: MEDICARE

## 2023-02-15 VITALS
BODY MASS INDEX: 23.68 KG/M2 | DIASTOLIC BLOOD PRESSURE: 50 MMHG | HEART RATE: 60 BPM | TEMPERATURE: 98 F | WEIGHT: 165 LBS | SYSTOLIC BLOOD PRESSURE: 104 MMHG | OXYGEN SATURATION: 98 %

## 2023-02-15 DIAGNOSIS — I25.10 CHRONIC CORONARY ARTERY DISEASE: Chronic | ICD-10-CM

## 2023-02-15 DIAGNOSIS — I48.0 PAROXYSMAL ATRIAL FIBRILLATION: ICD-10-CM

## 2023-02-15 DIAGNOSIS — I10 ESSENTIAL HYPERTENSION: Chronic | ICD-10-CM

## 2023-02-15 DIAGNOSIS — M48.062 SPINAL STENOSIS OF LUMBAR REGION WITH NEUROGENIC CLAUDICATION: Chronic | ICD-10-CM

## 2023-02-15 DIAGNOSIS — M51.36 DDD (DEGENERATIVE DISC DISEASE), LUMBAR: Chronic | ICD-10-CM

## 2023-02-15 DIAGNOSIS — R19.7 DIARRHEA, UNSPECIFIED TYPE: ICD-10-CM

## 2023-02-15 DIAGNOSIS — D50.0 IRON DEFICIENCY ANEMIA DUE TO CHRONIC BLOOD LOSS: ICD-10-CM

## 2023-02-15 DIAGNOSIS — M48.56XG NON-TRAUMATIC COMPRESSION FRACTURE OF L1 LUMBAR VERTEBRA WITH DELAYED HEALING, SUBSEQUENT ENCOUNTER: Primary | Chronic | ICD-10-CM

## 2023-02-15 DIAGNOSIS — S32.010A COMPRESSION FRACTURE OF L1 VERTEBRA, INITIAL ENCOUNTER: ICD-10-CM

## 2023-02-15 DIAGNOSIS — N18.32 STAGE 3B CHRONIC KIDNEY DISEASE: Chronic | ICD-10-CM

## 2023-02-15 DIAGNOSIS — S22.080G T12 COMPRESSION FRACTURE, WITH DELAYED HEALING, SUBSEQUENT ENCOUNTER: Chronic | ICD-10-CM

## 2023-02-15 DIAGNOSIS — E78.2 MIXED HYPERLIPIDEMIA: Chronic | ICD-10-CM

## 2023-02-15 DIAGNOSIS — M80.00XG AGE-RELATED OSTEOPOROSIS WITH CURRENT PATHOLOGICAL FRACTURE WITH DELAYED HEALING: Chronic | ICD-10-CM

## 2023-02-15 PROCEDURE — 99214 OFFICE O/P EST MOD 30 MIN: CPT | Performed by: INTERNAL MEDICINE

## 2023-02-15 RX ORDER — HYDROCODONE BITARTRATE AND ACETAMINOPHEN 5; 325 MG/1; MG/1
1 TABLET ORAL EVERY 8 HOURS PRN
Qty: 90 TABLET | Refills: 0 | Status: SHIPPED | OUTPATIENT
Start: 2023-02-15 | End: 2023-03-16 | Stop reason: SDUPTHER

## 2023-02-15 RX ORDER — DIPHENOXYLATE HYDROCHLORIDE AND ATROPINE SULFATE 2.5; .025 MG/1; MG/1
1 TABLET ORAL 4 TIMES DAILY PRN
Qty: 90 TABLET | Refills: 1 | Status: CANCELLED | OUTPATIENT
Start: 2023-02-15

## 2023-02-15 RX ORDER — DIPHENOXYLATE HYDROCHLORIDE AND ATROPINE SULFATE 2.5; .025 MG/1; MG/1
1 TABLET ORAL 4 TIMES DAILY PRN
Qty: 20 TABLET | Refills: 0 | Status: SHIPPED | OUTPATIENT
Start: 2023-02-15

## 2023-02-15 RX ORDER — ALENDRONATE SODIUM 70 MG/1
70 TABLET ORAL
Qty: 4 TABLET | Refills: 6 | Status: SHIPPED | OUTPATIENT
Start: 2023-02-15

## 2023-02-15 NOTE — PROGRESS NOTES
Patient Name: Rubén Rivas Today's Date: 2/15/2023   Patient MRN / CSN: [id number] / [id number] Date of Encounter: 2/15/2023   Patient Age / : 87 y.o. / 1935 Encounter Provider: Laura Fulton DO   Referring Physician: No ref. provider found          Rubén is a 87 y.o. male who is being seen today for Results and Follow-up      History of Present Illness     Rubén presents today for a follow-up on compression fractures of T12 and L1 with delayed healing.  He reports still feeling very limited in his mobility due to back pain.  He is wearing a lumbar brace and takes hydrocodone as needed for pain with some relief.  He reports tolerating this well when needed but does not like to take it unless is absolutely necessary.  He had repeat lumbar imaging on 2023 which showed persistent compression fractures of L1 and T12.  Patient also had DEXA scan on that day which showed osteoporosis.  He has never been on any osteoporosis medicines but is interested in trying Fosamax.  He is also interested in pursuing kyphoplasty at this point to see if this will give him some relief of this back pain.    Rubén has coronary artery disease and paroxysmal atrial fibrillation.  He is doing well, status post CABG in .  He saw his cardiology NP since last visit for his 6-month follow-up.  He discussed kyphoplasty with her and she told him that it would be fine for him to hold aspirin and Plavix to have the kyphoplasty done.  He denies recent chest pain or shortness of air.      Allergies include:Sulfa antibiotics  Current Outpatient Medications   Medication Sig Dispense Refill   • amLODIPine (NORVASC) 5 MG tablet Take 1.5 tablets by mouth Daily. as directed 45 tablet 6   • aspirin 81 MG tablet Take  by mouth daily.     • Budesonide (ENTOCORT EC) 3 MG 24 hr capsule Take 6 mg by mouth Daily.     • clopidogrel (PLAVIX) 75 MG tablet TAKE 1 TABLET BY MOUTH DAILY. 90 tablet 1   • colesevelam (WELCHOL) 625 MG tablet Take 625 mg by  mouth 2 (Two) Times a Day.     • diphenoxylate-atropine (Lomotil) 2.5-0.025 MG per tablet Take 1 tablet by mouth 4 (Four) Times a Day As Needed for Diarrhea. 20 tablet 0   • donepezil (Aricept) 10 MG tablet Take 2 tabs po nightly. 180 tablet 3   • escitalopram (LEXAPRO) 20 MG tablet Take 1 tablet by mouth once daily 90 tablet 3   • esomeprazole (nexIUM) 40 MG capsule Take 1 capsule by mouth Every Morning Before Breakfast. 30 capsule 5   • FIBER PO Take  by mouth Daily.     • Glucose Blood (ONETOUCH ULTRA BLUE VI) daily.     • HYDROcodone-acetaminophen (Norco) 5-325 MG per tablet Take 1 tablet by mouth Every 8 (Eight) Hours As Needed for Moderate Pain. 90 tablet 0   • metoprolol tartrate (LOPRESSOR) 25 MG tablet Take 1 tablet by mouth 2 (Two) Times a Day. 180 tablet 1   • Multiple Vitamins-Minerals (ONE-A-DAY MENS 50+ ADVANTAGE PO) Take  by mouth daily.     • mupirocin (BACTROBAN) 2 % ointment Apply  topically to the appropriate area as directed 3 (Three) Times a Day. 15 g 1   • rosuvastatin (CRESTOR) 20 MG tablet Take 1 tablet by mouth Daily. 90 tablet 3   • tamsulosin (FLOMAX) 0.4 MG capsule 24 hr capsule TAKE 1 CAPSULE BY MOUTH DAILY. 90 capsule 1   • tiZANidine (ZANAFLEX) 2 MG tablet TAKE 1 TABLET BY MOUTH 2 (TWO) TIMES A DAY AS NEEDED FOR MUSCLE SPASMS. 60 tablet 2   • vitamin B-12 (CYANOCOBALAMIN) 1000 MCG tablet Take 1,000 mcg by mouth Daily.     • alendronate (Fosamax) 70 MG tablet Take 1 tablet by mouth Every 7 (Seven) Days. 4 tablet 6   • losartan (COZAAR) 25 MG tablet        No current facility-administered medications for this visit.     Past Medical History:   Diagnosis Date   • Anemia    • Arthritis    • Depression    • Hyperlipidemia    • Hypertension    • Prostate cancer (HCC) 2013   • Stage 3 chronic kidney disease (HCC) 1/4/2021     Family History   Problem Relation Age of Onset   • Cancer Mother 35        breast   • Heart attack Father    • Cancer Sister         cervical   • Heart attack Brother     • Heart disease Brother    • Cancer Brother         Prostate     Past Surgical History:   Procedure Laterality Date   • CARDIAC SURGERY     • COLONOSCOPY     • HERNIA REPAIR     • LAPAROSCOPIC CHOLECYSTECTOMY  2020    PERFORMED AT [facility]   • STOMACH SURGERY       Social History     Substance and Sexual Activity   Alcohol Use No     Social History     Tobacco Use   Smoking Status Former   • Packs/day: 0.25   • Years: 15.00   • Pack years: 3.75   • Types: Cigarettes   • Quit date: 1976   • Years since quittin.6   Smokeless Tobacco Never     Social History     Substance and Sexual Activity   Drug Use No     Review of Systems   Constitutional: Negative for fever.   Respiratory: Negative for shortness of breath.    Cardiovascular: Negative for chest pain.   Gastrointestinal: Negative for abdominal pain.        Occasional diarrhea.  Patient takes Lomotil as needed and has not needed a prescription of this since  but would like a refill updated today.   Musculoskeletal: Positive for arthralgias and back pain.        Depression Assessment Review:  PHQ-9 Total Score:    Vital Signs & Measurements Taken This Encounter  /50 (BP Location: Left arm, Patient Position: Sitting, Cuff Size: Adult)   Pulse 60   Temp 98 °F (36.7 °C) (Temporal)   Wt 74.8 kg (165 lb)   SpO2 98%   BMI 23.68 kg/m²    SpO2 Percentage    02/15/23 1523   SpO2: 98%        BMI is within normal parameters. No other follow-up for BMI required.      Physical Exam  Vitals reviewed.   Constitutional:       General: He is not in acute distress.  HENT:      Head: Normocephalic and atraumatic.   Eyes:      General: No scleral icterus.     Extraocular Movements: Extraocular movements intact.      Conjunctiva/sclera: Conjunctivae normal.      Pupils: Pupils are equal, round, and reactive to light.   Cardiovascular:      Rate and Rhythm: Normal rate and regular rhythm.   Pulmonary:      Effort: Pulmonary effort is normal. No  respiratory distress.      Breath sounds: Normal breath sounds.   Musculoskeletal:         General: No swelling.      Comments: Wearing lumbar back brace.  Ambulating with a cane.   Skin:     General: Skin is warm and dry.      Coloration: Skin is not jaundiced.   Neurological:      Mental Status: He is alert.   Psychiatric:         Mood and Affect: Mood normal.         Behavior: Behavior normal.              Assessment & Plan  Patient Active Problem List   Diagnosis   • Anemia   • Chronic coronary artery disease   • Chronic osteoarthritis   • Depression   • Elevated prostate specific antigen (PSA)   • Enlarged prostate   • Hyperlipidemia   • Essential hypertension   • Iron deficiency   • Low back pain   • Malignant neoplasm of prostate (HCC)   • Prostate cancer (HCC)   • Mild cognitive impairment with memory loss   • Stage 3 chronic kidney disease ([facility])   • Spinal stenosis of lumbar region   • DDD (degenerative disc disease), lumbar   • Diverticulosis   • Gastroesophageal reflux disease without esophagitis   • Nontraumatic compression fracture of L1 vertebra ([facility])   • Age-related osteoporosis with current pathological fracture with delayed healing   • T12 compression fracture, with delayed healing, subsequent encounter   • Diarrhea       ICD-10-CM ICD-9-CM   1. Non-traumatic compression fracture of L1 lumbar vertebra with delayed healing, subsequent encounter  M48.56XG V54.27   2. T12 compression fracture, with delayed healing, subsequent encounter  S22.080G V54.17   3. Age-related osteoporosis with current pathological fracture with delayed healing  M80.00XG V54.29     733.01   4. Diarrhea, unspecified type  R19.7 787.91   5. Spinal stenosis of lumbar region with neurogenic claudication  M48.062 724.03   6. Compression fracture of L1 vertebra, initial encounter ([facility])  S32.010A 805.4   7. DDD (degenerative disc disease), lumbar  M51.36 722.52   8. Essential hypertension  I10 401.9   9. Mixed hyperlipidemia  E78.2  272.2   10. Iron deficiency anemia due to chronic blood loss  D50.0 280.0   11. Stage 3b chronic kidney disease (HCC)  N18.32 585.3   12. Paroxysmal atrial fibrillation (HCC)  I48.0 427.31   13. Chronic coronary artery disease  I25.10 414.00     Orders Placed This Encounter   Procedures   • Comprehensive Metabolic Panel     Standing Status:   Future     Standing Expiration Date:   2/15/2024     Order Specific Question:   Release to patient     Answer:   Routine Release   • CBC (No Diff)     Standing Status:   Future     Standing Expiration Date:   2/15/2024     Order Specific Question:   Release to patient     Answer:   Routine Release   • Lipid Panel     Standing Status:   Future     Standing Expiration Date:   2/15/2024   • TSH     Standing Status:   Future     Standing Expiration Date:   2/15/2024     Order Specific Question:   Release to patient     Answer:   Routine Release   • T4, Free     Standing Status:   Future     Standing Expiration Date:   2/15/2024   • Vitamin B12     Standing Status:   Future     Standing Expiration Date:   2/15/2024     Order Specific Question:   Release to patient     Answer:   Routine Release   • CK     Standing Status:   Future     Standing Expiration Date:   2/15/2024     Order Specific Question:   Release to patient     Answer:   Routine Release   • Ambulatory Referral to Orthopedic Surgery     Referral Priority:   Urgent     Referral Type:   Consultation     Referral Reason:   Specialty Services Required     Requested Specialty:   Orthopedic Surgery     Number of Visits Requested:   1       Meds Ordered During Visit:  New Medications Ordered This Visit   Medications   • alendronate (Fosamax) 70 MG tablet     Sig: Take 1 tablet by mouth Every 7 (Seven) Days.     Dispense:  4 tablet     Refill:  6   • HYDROcodone-acetaminophen (Norco) 5-325 MG per tablet     Sig: Take 1 tablet by mouth Every 8 (Eight) Hours As Needed for Moderate Pain.     Dispense:  90 tablet     Refill:  0      Keep on file and fill when due.   • diphenoxylate-atropine (Lomotil) 2.5-0.025 MG per tablet     Sig: Take 1 tablet by mouth 4 (Four) Times a Day As Needed for Diarrhea.     Dispense:  20 tablet     Refill:  0     I reviewed dexa, and recent labs with patient. I reviewed xray images and report with patient. I also reviewed cardiology note.  Given patient's persistent pain, limiting to his functional capacity, I believe that kyphoplasty is reasonable to pursue at this point.  I will refer patient to Dr. Mace.  I reviewed PDMP today.  I will update Lomotil and Norco prescriptions as requested.  Also discussed Fosamax with patient and he is interested in trying this.  We will plan to monitor his renal function closely with labs in 1 month just prior to next appointment.    Return in about 1 month (around 3/15/2023), or if symptoms worsen or fail to improve, for Recheck, Labs Prior.          Referring Provider (if known): No ref. provider found      This document has been electronically signed by Laura Fulton DO  February 15, 2023 16:11 EST    Laura Fulton DO, FACOI  [address] [place] [address]  [phone] (office)    Part of this note may be an electronic transcription/translation of spoken language to printed text using the Dragon Dictation System.

## 2023-02-27 ENCOUNTER — TELEPHONE (OUTPATIENT)
Dept: FAMILY MEDICINE CLINIC | Facility: CLINIC | Age: 88
End: 2023-02-27
Payer: MEDICARE

## 2023-02-27 DIAGNOSIS — S32.010G COMPRESSION FRACTURE OF L1 VERTEBRA WITH DELAYED HEALING, SUBSEQUENT ENCOUNTER: ICD-10-CM

## 2023-02-27 DIAGNOSIS — S22.080G T12 COMPRESSION FRACTURE, WITH DELAYED HEALING, SUBSEQUENT ENCOUNTER: Primary | Chronic | ICD-10-CM

## 2023-02-27 NOTE — TELEPHONE ENCOUNTER
Patient had requested Ortho evaluation with Dr. Mace for potential kyphoplasty.  After placing that order, our office was notified that Dr. Mace is planning to retire.  Patient/family would like to see neurosurgeon, Dr. Jose De Jesus Encinas. I will place that referral order today as requested.

## 2023-03-08 ENCOUNTER — TRANSCRIBE ORDERS (OUTPATIENT)
Dept: FAMILY MEDICINE CLINIC | Facility: CLINIC | Age: 88
End: 2023-03-08
Payer: MEDICARE

## 2023-03-08 ENCOUNTER — CLINICAL SUPPORT (OUTPATIENT)
Dept: FAMILY MEDICINE CLINIC | Facility: CLINIC | Age: 88
End: 2023-03-08
Payer: MEDICARE

## 2023-03-08 DIAGNOSIS — E78.2 MIXED HYPERLIPIDEMIA: Chronic | ICD-10-CM

## 2023-03-08 DIAGNOSIS — N18.32 CHRONIC KIDNEY DISEASE (CKD) STAGE G3B/A3, MODERATELY DECREASED GLOMERULAR FILTRATION RATE (GFR) BETWEEN 30-44 ML/MIN/1.73 SQUARE METER AND ALBUMINURIA CREATININE RATIO GREATER THAN 300 MG/G (C*: ICD-10-CM

## 2023-03-08 DIAGNOSIS — N18.32 CHRONIC KIDNEY DISEASE (CKD) STAGE G3B/A3, MODERATELY DECREASED GLOMERULAR FILTRATION RATE (GFR) BETWEEN 30-44 ML/MIN/1.73 SQUARE METER AND ALBUMINURIA CREATININE RATIO GREATER THAN 300 MG/G (C*: Primary | ICD-10-CM

## 2023-03-08 DIAGNOSIS — N18.32 STAGE 3B CHRONIC KIDNEY DISEASE: Chronic | ICD-10-CM

## 2023-03-08 DIAGNOSIS — D50.0 IRON DEFICIENCY ANEMIA DUE TO CHRONIC BLOOD LOSS: ICD-10-CM

## 2023-03-08 DIAGNOSIS — I10 ESSENTIAL HYPERTENSION: Chronic | ICD-10-CM

## 2023-03-08 PROCEDURE — 82570 ASSAY OF URINE CREATININE: CPT | Performed by: INTERNAL MEDICINE

## 2023-03-08 PROCEDURE — 84156 ASSAY OF PROTEIN URINE: CPT | Performed by: INTERNAL MEDICINE

## 2023-03-08 PROCEDURE — 36415 COLL VENOUS BLD VENIPUNCTURE: CPT | Performed by: INTERNAL MEDICINE

## 2023-03-08 PROCEDURE — 80053 COMPREHEN METABOLIC PANEL: CPT | Performed by: INTERNAL MEDICINE

## 2023-03-08 PROCEDURE — 82043 UR ALBUMIN QUANTITATIVE: CPT | Performed by: INTERNAL MEDICINE

## 2023-03-08 PROCEDURE — 82550 ASSAY OF CK (CPK): CPT | Performed by: INTERNAL MEDICINE

## 2023-03-08 PROCEDURE — 84439 ASSAY OF FREE THYROXINE: CPT | Performed by: INTERNAL MEDICINE

## 2023-03-08 PROCEDURE — 80061 LIPID PANEL: CPT | Performed by: INTERNAL MEDICINE

## 2023-03-08 PROCEDURE — 82607 VITAMIN B-12: CPT | Performed by: INTERNAL MEDICINE

## 2023-03-08 PROCEDURE — 85027 COMPLETE CBC AUTOMATED: CPT | Performed by: INTERNAL MEDICINE

## 2023-03-08 PROCEDURE — 84443 ASSAY THYROID STIM HORMONE: CPT | Performed by: INTERNAL MEDICINE

## 2023-03-08 NOTE — PROGRESS NOTES
Venipuncture Blood Specimen Collection  Venipuncture performed in Right arm by Lauren Jiménez MA with good hemostasis. Patient tolerated the procedure well without complications.   03/08/23   Lauren Jiménez MA

## 2023-03-09 LAB
ALBUMIN SERPL-MCNC: 3.9 G/DL (ref 3.5–5.2)
ALBUMIN UR-MCNC: 22.3 MG/DL
ALBUMIN/GLOB SERPL: 1.7 G/DL
ALP SERPL-CCNC: 28 U/L (ref 39–117)
ALT SERPL W P-5'-P-CCNC: 24 U/L (ref 1–41)
ANION GAP SERPL CALCULATED.3IONS-SCNC: 10.6 MMOL/L (ref 5–15)
AST SERPL-CCNC: 20 U/L (ref 1–40)
BILIRUB SERPL-MCNC: 0.3 MG/DL (ref 0–1.2)
BUN SERPL-MCNC: 24 MG/DL (ref 8–23)
BUN/CREAT SERPL: 15.3 (ref 7–25)
CALCIUM SPEC-SCNC: 9.2 MG/DL (ref 8.6–10.5)
CHLORIDE SERPL-SCNC: 105 MMOL/L (ref 98–107)
CHOLEST SERPL-MCNC: 168 MG/DL (ref 0–200)
CK SERPL-CCNC: 35 U/L (ref 20–200)
CO2 SERPL-SCNC: 27.4 MMOL/L (ref 22–29)
CREAT SERPL-MCNC: 1.57 MG/DL (ref 0.76–1.27)
CREAT UR-MCNC: 169.3 MG/DL
CREAT UR-MCNC: 169.3 MG/DL
DEPRECATED RDW RBC AUTO: 47.7 FL (ref 37–54)
EGFRCR SERPLBLD CKD-EPI 2021: 42.7 ML/MIN/1.73
ERYTHROCYTE [DISTWIDTH] IN BLOOD BY AUTOMATED COUNT: 13.8 % (ref 12.3–15.4)
GLOBULIN UR ELPH-MCNC: 2.3 GM/DL
GLUCOSE SERPL-MCNC: 104 MG/DL (ref 65–99)
HCT VFR BLD AUTO: 29.7 % (ref 37.5–51)
HDLC SERPL-MCNC: 52 MG/DL (ref 40–60)
HGB BLD-MCNC: 10.1 G/DL (ref 13–17.7)
LDLC SERPL CALC-MCNC: 89 MG/DL (ref 0–100)
LDLC/HDLC SERPL: 1.63 {RATIO}
MCH RBC QN AUTO: 32.2 PG (ref 26.6–33)
MCHC RBC AUTO-ENTMCNC: 34 G/DL (ref 31.5–35.7)
MCV RBC AUTO: 94.6 FL (ref 79–97)
MICROALBUMIN/CREAT UR: 131.7 MG/G
PLATELET # BLD AUTO: 206 10*3/MM3 (ref 140–450)
PMV BLD AUTO: 10.3 FL (ref 6–12)
POTASSIUM SERPL-SCNC: 3.9 MMOL/L (ref 3.5–5.2)
PROT ?TM UR-MCNC: 48.1 MG/DL
PROT SERPL-MCNC: 6.2 G/DL (ref 6–8.5)
PROT/CREAT UR: 284.1 MG/G CREA (ref 0–200)
RBC # BLD AUTO: 3.14 10*6/MM3 (ref 4.14–5.8)
SODIUM SERPL-SCNC: 143 MMOL/L (ref 136–145)
T4 FREE SERPL-MCNC: 1.16 NG/DL (ref 0.93–1.7)
TRIGL SERPL-MCNC: 157 MG/DL (ref 0–150)
TSH SERPL DL<=0.05 MIU/L-ACNC: 1.53 UIU/ML (ref 0.27–4.2)
VIT B12 BLD-MCNC: 1142 PG/ML (ref 211–946)
VLDLC SERPL-MCNC: 27 MG/DL (ref 5–40)
WBC NRBC COR # BLD: 9.68 10*3/MM3 (ref 3.4–10.8)

## 2023-03-16 ENCOUNTER — OFFICE VISIT (OUTPATIENT)
Dept: FAMILY MEDICINE CLINIC | Facility: CLINIC | Age: 88
End: 2023-03-16
Payer: MEDICARE

## 2023-03-16 VITALS
OXYGEN SATURATION: 98 % | SYSTOLIC BLOOD PRESSURE: 102 MMHG | HEART RATE: 50 BPM | BODY MASS INDEX: 24.25 KG/M2 | WEIGHT: 169 LBS | TEMPERATURE: 97.1 F | DIASTOLIC BLOOD PRESSURE: 50 MMHG

## 2023-03-16 DIAGNOSIS — M48.062 SPINAL STENOSIS OF LUMBAR REGION WITH NEUROGENIC CLAUDICATION: Chronic | ICD-10-CM

## 2023-03-16 DIAGNOSIS — M80.00XG AGE-RELATED OSTEOPOROSIS WITH CURRENT PATHOLOGICAL FRACTURE WITH DELAYED HEALING: Primary | Chronic | ICD-10-CM

## 2023-03-16 DIAGNOSIS — M48.56XG NON-TRAUMATIC COMPRESSION FRACTURE OF L1 LUMBAR VERTEBRA WITH DELAYED HEALING, SUBSEQUENT ENCOUNTER: Chronic | ICD-10-CM

## 2023-03-16 DIAGNOSIS — M51.36 DDD (DEGENERATIVE DISC DISEASE), LUMBAR: Chronic | ICD-10-CM

## 2023-03-16 DIAGNOSIS — S22.080G T12 COMPRESSION FRACTURE, WITH DELAYED HEALING, SUBSEQUENT ENCOUNTER: Chronic | ICD-10-CM

## 2023-03-16 PROCEDURE — 99214 OFFICE O/P EST MOD 30 MIN: CPT | Performed by: INTERNAL MEDICINE

## 2023-03-16 PROCEDURE — 1160F RVW MEDS BY RX/DR IN RCRD: CPT | Performed by: INTERNAL MEDICINE

## 2023-03-16 PROCEDURE — 1159F MED LIST DOCD IN RCRD: CPT | Performed by: INTERNAL MEDICINE

## 2023-03-16 RX ORDER — HYDROCODONE BITARTRATE AND ACETAMINOPHEN 5; 325 MG/1; MG/1
1 TABLET ORAL EVERY 8 HOURS PRN
Qty: 90 TABLET | Refills: 0 | Status: SHIPPED | OUTPATIENT
Start: 2023-03-16

## 2023-03-16 RX ORDER — DIAZEPAM 5 MG/1
TABLET ORAL
COMMUNITY
Start: 2023-03-14

## 2023-03-16 NOTE — PROGRESS NOTES
Patient Name: Rubén Rivas Today's Date: 3/16/2023   Patient MRN / CSN: [id number] / [id number] Date of Encounter: 3/16/2023   Patient Age / : 87 y.o. / 1935 Encounter Provider: Laura Fulton DO   Referring Physician: No ref. provider found          Rubén is a 87 y.o. male who is being seen today for Follow-up      History of Present Illness     Rubén presents today for follow up osteoporosis with nontraumatic compression fractures of T12 and L1 Patient is established now with Dr. Jose De Jesus Encinas's group and is planning for MRI tomorrow then kyphoplasty thereafter. He is taking norco as needed with some relief.  He is also wearing a lumbar back brace with some relief.    Allergies include:Sulfa antibiotics  Current Outpatient Medications   Medication Sig Dispense Refill   • alendronate (Fosamax) 70 MG tablet Take 1 tablet by mouth Every 7 (Seven) Days. 4 tablet 6   • aspirin 81 MG tablet Take  by mouth daily.     • Budesonide (ENTOCORT EC) 3 MG 24 hr capsule Take 2 capsules by mouth Daily.     • clopidogrel (PLAVIX) 75 MG tablet TAKE 1 TABLET BY MOUTH DAILY. 90 tablet 1   • colesevelam (WELCHOL) 625 MG tablet Take 1 tablet by mouth 2 (Two) Times a Day.     • diazePAM (VALIUM) 5 MG tablet Only for MRI     • diphenoxylate-atropine (Lomotil) 2.5-0.025 MG per tablet Take 1 tablet by mouth 4 (Four) Times a Day As Needed for Diarrhea. 20 tablet 0   • donepezil (Aricept) 10 MG tablet Take 2 tabs po nightly. 180 tablet 3   • escitalopram (LEXAPRO) 20 MG tablet Take 1 tablet by mouth once daily 90 tablet 3   • esomeprazole (nexIUM) 40 MG capsule Take 1 capsule by mouth Every Morning Before Breakfast. 30 capsule 5   • FIBER PO Take  by mouth Daily.     • Glucose Blood (ONETOUCH ULTRA BLUE VI) daily.     • HYDROcodone-acetaminophen (Norco) 5-325 MG per tablet Take 1 tablet by mouth Every 8 (Eight) Hours As Needed for Moderate Pain. 90 tablet 0   • losartan (COZAAR) 25 MG tablet      • metoprolol tartrate (LOPRESSOR) 25 MG  tablet Take 1 tablet by mouth 2 (Two) Times a Day. 180 tablet 1   • Multiple Vitamins-Minerals (ONE-A-DAY MENS 50+ ADVANTAGE PO) Take  by mouth daily.     • mupirocin (BACTROBAN) 2 % ointment Apply  topically to the appropriate area as directed 3 (Three) Times a Day. 15 g 1   • rosuvastatin (CRESTOR) 20 MG tablet Take 1 tablet by mouth Daily. 90 tablet 3   • tamsulosin (FLOMAX) 0.4 MG capsule 24 hr capsule TAKE 1 CAPSULE BY MOUTH DAILY. 90 capsule 1   • tiZANidine (ZANAFLEX) 2 MG tablet TAKE 1 TABLET BY MOUTH 2 (TWO) TIMES A DAY AS NEEDED FOR MUSCLE SPASMS. 60 tablet 2   • vitamin B-12 (CYANOCOBALAMIN) 1000 MCG tablet Take 1 tablet by mouth Daily.     • amLODIPine (NORVASC) 5 MG tablet Take 1.5 tablets by mouth Daily. as directed (Patient not taking: Reported on 3/16/2023) 45 tablet 6     No current facility-administered medications for this visit.     Past Medical History:   Diagnosis Date   • Anemia    • Arthritis    • Depression    • Hyperlipidemia    • Hypertension    • Prostate cancer (HCC)    • Stage 3 chronic kidney disease (HCC) 2021     Family History   Problem Relation Age of Onset   • Cancer Mother 35        breast   • Heart attack Father    • Cancer Sister         cervical   • Heart attack Brother    • Heart disease Brother    • Cancer Brother         Prostate     Past Surgical History:   Procedure Laterality Date   • CARDIAC SURGERY     • COLONOSCOPY     • HERNIA REPAIR     • LAPAROSCOPIC CHOLECYSTECTOMY  2020    PERFORMED AT [facility]   • STOMACH SURGERY       Social History     Substance and Sexual Activity   Alcohol Use No     Social History     Tobacco Use   Smoking Status Former   • Packs/day: 0.25   • Years: 15.00   • Pack years: 3.75   • Types: Cigarettes   • Quit date: 1976   • Years since quittin.6   Smokeless Tobacco Never     Social History     Substance and Sexual Activity   Drug Use No     Review of Systems   Constitutional: Negative for fever.   Musculoskeletal:  Positive for arthralgias and back pain.        Depression Assessment Review:  PHQ-9 Total Score: 1  Vital Signs & Measurements Taken This Encounter  /50 (BP Location: Right arm, Patient Position: Sitting, Cuff Size: Adult)   Pulse 50   Temp 97.1 °F (36.2 °C) (Temporal)   Wt 76.7 kg (169 lb)   SpO2 98%   BMI 24.25 kg/m²    SpO2 Percentage    03/16/23 1017   SpO2: 98%        BMI is within normal parameters. No other follow-up for BMI required.      Physical Exam  Vitals reviewed.   Constitutional:       General: He is not in acute distress.  HENT:      Head: Normocephalic and atraumatic.   Eyes:      General: No scleral icterus.     Extraocular Movements: Extraocular movements intact.      Conjunctiva/sclera: Conjunctivae normal.      Pupils: Pupils are equal, round, and reactive to light.   Cardiovascular:      Rate and Rhythm: Regular rhythm. Bradycardia present.   Pulmonary:      Effort: Pulmonary effort is normal. No respiratory distress.      Breath sounds: Normal breath sounds.   Abdominal:      Palpations: Abdomen is soft.      Tenderness: There is no abdominal tenderness.   Musculoskeletal:         General: No swelling.   Skin:     General: Skin is warm and dry.      Coloration: Skin is not jaundiced.   Neurological:      Mental Status: He is alert.   Psychiatric:         Mood and Affect: Mood normal.         Behavior: Behavior normal.              Assessment & Plan  Patient Active Problem List   Diagnosis   • Anemia   • Chronic coronary artery disease   • Chronic osteoarthritis   • Depression   • Elevated prostate specific antigen (PSA)   • Enlarged prostate   • Hyperlipidemia   • Essential hypertension   • Iron deficiency   • Low back pain   • Malignant neoplasm of prostate (HCC)   • Prostate cancer (HCC)   • Mild cognitive impairment with memory loss   • Stage 3 chronic kidney disease (HCC)   • Spinal stenosis of lumbar region   • DDD (degenerative disc disease), lumbar   • Diverticulosis   •  Gastroesophageal reflux disease without esophagitis   • Nontraumatic compression fracture of L1 vertebra (HCC)   • Age-related osteoporosis with current pathological fracture with delayed healing   • T12 compression fracture, with delayed healing, subsequent encounter   • Diarrhea   • Paroxysmal atrial fibrillation (HCC)   • Compression fracture of L1 lumbar vertebra (HCC)       ICD-10-CM ICD-9-CM   1. Age-related osteoporosis with current pathological fracture with delayed healing  M80.00XG V54.29     733.01   2. Non-traumatic compression fracture of L1 lumbar vertebra with delayed healing, subsequent encounter  M48.56XG V54.27   3. T12 compression fracture, with delayed healing, subsequent encounter  S22.080G V54.17   4. Spinal stenosis of lumbar region with neurogenic claudication  M48.062 724.03   5. DDD (degenerative disc disease), lumbar  M51.36 722.52     No orders of the defined types were placed in this encounter.      Meds Ordered During Visit:  New Medications Ordered This Visit   Medications   • HYDROcodone-acetaminophen (Norco) 5-325 MG per tablet     Sig: Take 1 tablet by mouth Every 8 (Eight) Hours As Needed for Moderate Pain.     Dispense:  90 tablet     Refill:  0     Keep on file and fill when due.     I reviewed nephrology and cardiology notes today. I reviewed PDMP today.  I encourage patient to follow-up with his neurosurgeon as planned.  We will continue her hydrocodone as needed for pain pending kyphoplasty with plans to stop the medicine after kyphoplasty is performed and hopefully patient is clinically improved.    Return in about 1 month (around 4/16/2023), or if symptoms worsen or fail to improve, for Recheck.          Referring Provider (if known): No ref. provider found      This document has been electronically signed by Laura Fulton DO  March 16, 2023 11:28 EDT    Laura Fulton DO, FACOI  [address] [place] [address]  [phone] (office)    Part of this note  may be an electronic transcription/translation of spoken language to printed text using the Dragon Dictation System.

## 2023-03-29 ENCOUNTER — LAB (OUTPATIENT)
Dept: LAB | Facility: HOSPITAL | Age: 88
End: 2023-03-29
Payer: MEDICARE

## 2023-03-29 ENCOUNTER — TRANSCRIBE ORDERS (OUTPATIENT)
Dept: ADMINISTRATIVE | Facility: HOSPITAL | Age: 88
End: 2023-03-29
Payer: MEDICARE

## 2023-03-29 ENCOUNTER — HOSPITAL ENCOUNTER (OUTPATIENT)
Dept: GENERAL RADIOLOGY | Facility: HOSPITAL | Age: 88
Discharge: HOME | End: 2023-03-29
Payer: MEDICARE

## 2023-03-29 DIAGNOSIS — M54.50 ACUTE LOW BACK PAIN WITHOUT SCIATICA, UNSPECIFIED BACK PAIN LATERALITY: ICD-10-CM

## 2023-03-29 DIAGNOSIS — M54.50 ACUTE LOW BACK PAIN WITHOUT SCIATICA, UNSPECIFIED BACK PAIN LATERALITY: Primary | ICD-10-CM

## 2023-03-29 LAB
APTT PPP: 25.5 SECONDS (ref 26.5–34.5)
INR PPP: 0.95 (ref 0.9–1.1)
PROTHROMBIN TIME: 12.9 SECONDS (ref 12.1–14.7)

## 2023-03-29 PROCEDURE — 81001 URINALYSIS AUTO W/SCOPE: CPT

## 2023-03-29 PROCEDURE — 85730 THROMBOPLASTIN TIME PARTIAL: CPT

## 2023-03-29 PROCEDURE — 80048 BASIC METABOLIC PNL TOTAL CA: CPT

## 2023-03-29 PROCEDURE — 71046 X-RAY EXAM CHEST 2 VIEWS: CPT | Performed by: RADIOLOGY

## 2023-03-29 PROCEDURE — 71046 X-RAY EXAM CHEST 2 VIEWS: CPT

## 2023-03-29 PROCEDURE — 85025 COMPLETE CBC W/AUTO DIFF WBC: CPT

## 2023-03-29 PROCEDURE — 36415 COLL VENOUS BLD VENIPUNCTURE: CPT

## 2023-03-29 PROCEDURE — 85610 PROTHROMBIN TIME: CPT

## 2023-03-30 LAB
ANION GAP SERPL CALCULATED.3IONS-SCNC: 9.2 MMOL/L (ref 5–15)
BACTERIA UR QL AUTO: NORMAL /HPF
BASOPHILS # BLD AUTO: 0.04 10*3/MM3 (ref 0–0.2)
BASOPHILS NFR BLD AUTO: 0.5 % (ref 0–1.5)
BILIRUB UR QL STRIP: NEGATIVE
BUN SERPL-MCNC: 23 MG/DL (ref 8–23)
BUN/CREAT SERPL: 14.5 (ref 7–25)
CALCIUM SPEC-SCNC: 8.8 MG/DL (ref 8.6–10.5)
CHLORIDE SERPL-SCNC: 107 MMOL/L (ref 98–107)
CLARITY UR: ABNORMAL
CO2 SERPL-SCNC: 25.8 MMOL/L (ref 22–29)
COLOR UR: YELLOW
CREAT SERPL-MCNC: 1.59 MG/DL (ref 0.76–1.27)
DEPRECATED RDW RBC AUTO: 48.8 FL (ref 37–54)
EGFRCR SERPLBLD CKD-EPI 2021: 42 ML/MIN/1.73
EOSINOPHIL # BLD AUTO: 0.07 10*3/MM3 (ref 0–0.4)
EOSINOPHIL NFR BLD AUTO: 0.9 % (ref 0.3–6.2)
ERYTHROCYTE [DISTWIDTH] IN BLOOD BY AUTOMATED COUNT: 14 % (ref 12.3–15.4)
GLUCOSE SERPL-MCNC: 115 MG/DL (ref 65–99)
GLUCOSE UR STRIP-MCNC: NEGATIVE MG/DL
HCT VFR BLD AUTO: 29 % (ref 37.5–51)
HGB BLD-MCNC: 9.4 G/DL (ref 13–17.7)
HGB UR QL STRIP.AUTO: NEGATIVE
HYALINE CASTS UR QL AUTO: NORMAL /LPF
IMM GRANULOCYTES # BLD AUTO: 0.05 10*3/MM3 (ref 0–0.05)
IMM GRANULOCYTES NFR BLD AUTO: 0.6 % (ref 0–0.5)
KETONES UR QL STRIP: ABNORMAL
LEUKOCYTE ESTERASE UR QL STRIP.AUTO: NEGATIVE
LYMPHOCYTES # BLD AUTO: 2.08 10*3/MM3 (ref 0.7–3.1)
LYMPHOCYTES NFR BLD AUTO: 25.7 % (ref 19.6–45.3)
MCH RBC QN AUTO: 31 PG (ref 26.6–33)
MCHC RBC AUTO-ENTMCNC: 32.4 G/DL (ref 31.5–35.7)
MCV RBC AUTO: 95.7 FL (ref 79–97)
MONOCYTES # BLD AUTO: 0.6 10*3/MM3 (ref 0.1–0.9)
MONOCYTES NFR BLD AUTO: 7.4 % (ref 5–12)
NEUTROPHILS NFR BLD AUTO: 5.26 10*3/MM3 (ref 1.7–7)
NEUTROPHILS NFR BLD AUTO: 64.9 % (ref 42.7–76)
NITRITE UR QL STRIP: NEGATIVE
NRBC BLD AUTO-RTO: 0 /100 WBC (ref 0–0.2)
PH UR STRIP.AUTO: 5.5 [PH] (ref 5–8)
PLATELET # BLD AUTO: 197 10*3/MM3 (ref 140–450)
PMV BLD AUTO: 10.6 FL (ref 6–12)
POTASSIUM SERPL-SCNC: 4.8 MMOL/L (ref 3.5–5.2)
PROT UR QL STRIP: ABNORMAL
RBC # BLD AUTO: 3.03 10*6/MM3 (ref 4.14–5.8)
RBC # UR STRIP: NORMAL /HPF
REF LAB TEST METHOD: NORMAL
SODIUM SERPL-SCNC: 142 MMOL/L (ref 136–145)
SP GR UR STRIP: 1.02 (ref 1–1.03)
SQUAMOUS #/AREA URNS HPF: NORMAL /HPF
UROBILINOGEN UR QL STRIP: ABNORMAL
WBC # UR STRIP: NORMAL /HPF
WBC NRBC COR # BLD: 8.1 10*3/MM3 (ref 3.4–10.8)

## 2023-05-02 ENCOUNTER — OFFICE VISIT (OUTPATIENT)
Dept: FAMILY MEDICINE CLINIC | Facility: CLINIC | Age: 88
End: 2023-05-02
Payer: MEDICARE

## 2023-05-02 VITALS
HEART RATE: 69 BPM | DIASTOLIC BLOOD PRESSURE: 62 MMHG | TEMPERATURE: 97.5 F | WEIGHT: 163.2 LBS | SYSTOLIC BLOOD PRESSURE: 138 MMHG | BODY MASS INDEX: 23.42 KG/M2 | OXYGEN SATURATION: 99 %

## 2023-05-02 DIAGNOSIS — N18.32 STAGE 3B CHRONIC KIDNEY DISEASE: Chronic | ICD-10-CM

## 2023-05-02 DIAGNOSIS — M48.56XG NON-TRAUMATIC COMPRESSION FRACTURE OF L1 LUMBAR VERTEBRA WITH DELAYED HEALING, SUBSEQUENT ENCOUNTER: Chronic | ICD-10-CM

## 2023-05-02 DIAGNOSIS — S32.010A COMPRESSION FRACTURE OF L1 VERTEBRA, INITIAL ENCOUNTER: ICD-10-CM

## 2023-05-02 DIAGNOSIS — E78.2 MIXED HYPERLIPIDEMIA: Chronic | ICD-10-CM

## 2023-05-02 DIAGNOSIS — I10 ESSENTIAL HYPERTENSION: Chronic | ICD-10-CM

## 2023-05-02 DIAGNOSIS — M80.00XG AGE-RELATED OSTEOPOROSIS WITH CURRENT PATHOLOGICAL FRACTURE WITH DELAYED HEALING: Chronic | ICD-10-CM

## 2023-05-02 DIAGNOSIS — S22.080G T12 COMPRESSION FRACTURE, WITH DELAYED HEALING, SUBSEQUENT ENCOUNTER: Primary | Chronic | ICD-10-CM

## 2023-05-02 PROBLEM — R19.7 DIARRHEA: Status: RESOLVED | Noted: 2023-02-15 | Resolved: 2023-05-02

## 2023-05-02 RX ORDER — TRAMADOL HYDROCHLORIDE 50 MG/1
50 TABLET ORAL EVERY 6 HOURS PRN
COMMUNITY

## 2023-05-02 RX ORDER — TIZANIDINE 2 MG/1
2 TABLET ORAL 2 TIMES DAILY PRN
Qty: 60 TABLET | Refills: 2 | Status: SHIPPED | OUTPATIENT
Start: 2023-05-02

## 2023-05-02 NOTE — PROGRESS NOTES
"  Patient Name: Rubén Rivas Today's Date: 2023   Patient MRN / CSN: [id number] / [id number] Date of Encounter: 2023   Patient Age / : 87 y.o. / 1935 Encounter Provider: Laura Fulton DO   Referring Physician: No ref. provider found          Rubén is a 87 y.o. male who is being seen today for Follow-up, compression fracture, and Hypertension      History of Present Illness     Rubén presents today for a follow-up on known traumatic compression fractures of T12 and L1 and hypertension.  He reports \"doing great\".  Since last visit, he had kyphoplasty done by Dr. Mckinnon in [place].  He reports being able to walk out after the surgery.  He reports still feeling some weakness in his back and legs but the pain is drastically improved.  He is no longer taking hydrocodone as prescribed upon discharge from the surgery.  He reports most days not needing any pain medicine but rarely has taken tramadol since the surgery.  He reports the pain that he experiences now is more of a muscle soreness that he notices when he overexerts.  He has gone back to doing his yard work and feels he is tolerating that pretty well.  He is interested in having an updated prescription of the muscle relaxer to use as needed for the muscle soreness when he has it.  His blood pressure is well controlled with the current regimen.  He reports tolerating his medicines well.  He is without further complaints.    Allergies include:Sulfa antibiotics  Current Outpatient Medications   Medication Sig Dispense Refill   • alendronate (Fosamax) 70 MG tablet Take 1 tablet by mouth Every 7 (Seven) Days. 4 tablet 6   • aspirin 81 MG tablet Take  by mouth daily.     • Budesonide (ENTOCORT EC) 3 MG 24 hr capsule Take 2 capsules by mouth Daily.     • clopidogrel (PLAVIX) 75 MG tablet TAKE 1 TABLET BY MOUTH DAILY. 90 tablet 1   • colesevelam (WELCHOL) 625 MG tablet Take 1 tablet by mouth 2 (Two) Times a Day.     • diphenoxylate-atropine (Lomotil) " 2.5-0.025 MG per tablet Take 1 tablet by mouth 4 (Four) Times a Day As Needed for Diarrhea. 20 tablet 0   • donepezil (Aricept) 10 MG tablet Take 2 tabs po nightly. 180 tablet 3   • escitalopram (LEXAPRO) 20 MG tablet Take 1 tablet by mouth once daily 90 tablet 3   • esomeprazole (nexIUM) 40 MG capsule Take 1 capsule by mouth Every Morning Before Breakfast. 30 capsule 5   • FIBER PO Take  by mouth Daily.     • Glucose Blood (ONETOUCH ULTRA BLUE VI) daily.     • losartan (COZAAR) 25 MG tablet      • metoprolol tartrate (LOPRESSOR) 25 MG tablet Take 1 tablet by mouth 2 (Two) Times a Day. 180 tablet 1   • Multiple Vitamins-Minerals (ONE-A-DAY MENS 50+ ADVANTAGE PO) Take  by mouth daily.     • mupirocin (BACTROBAN) 2 % ointment Apply  topically to the appropriate area as directed 3 (Three) Times a Day. 15 g 1   • rosuvastatin (CRESTOR) 20 MG tablet Take 1 tablet by mouth Daily. 90 tablet 3   • tamsulosin (FLOMAX) 0.4 MG capsule 24 hr capsule TAKE 1 CAPSULE BY MOUTH DAILY. 90 capsule 1   • tiZANidine (ZANAFLEX) 2 MG tablet Take 1 tablet by mouth 2 (Two) Times a Day As Needed for Muscle Spasms. 60 tablet 2   • traMADol (ULTRAM) 50 MG tablet Take 1 tablet by mouth Every 6 (Six) Hours As Needed for Moderate Pain.     • vitamin B-12 (CYANOCOBALAMIN) 1000 MCG tablet Take 1 tablet by mouth Daily.       No current facility-administered medications for this visit.     Past Medical History:   Diagnosis Date   • Anemia    • Arthritis    • Depression    • Hyperlipidemia    • Hypertension    • Prostate cancer 2013   • Stage 3 chronic kidney disease 1/4/2021     Family History   Problem Relation Age of Onset   • Cancer Mother 35        breast   • Heart attack Father    • Cancer Sister         cervical   • Heart attack Brother    • Heart disease Brother    • Cancer Brother         Prostate     Past Surgical History:   Procedure Laterality Date   • BACK SURGERY     • CARDIAC SURGERY     • COLONOSCOPY     • HERNIA REPAIR     •  LAPAROSCOPIC CHOLECYSTECTOMY  2020    PERFORMED AT [facility]   • STOMACH SURGERY       Social History     Substance and Sexual Activity   Alcohol Use No     Social History     Tobacco Use   Smoking Status Former   • Packs/day: 0.25   • Years: 15.00   • Pack years: 3.75   • Types: Cigarettes   • Quit date: 1976   • Years since quittin.8   Smokeless Tobacco Never     Social History     Substance and Sexual Activity   Drug Use No     Review of Systems   Constitutional: Negative for fever.   Respiratory: Negative for shortness of breath.    Cardiovascular: Negative for chest pain.   Gastrointestinal: Negative for abdominal pain.   Musculoskeletal: Positive for arthralgias, back pain and myalgias.        Improving drastically after kyphoplasty. He reports some muscle soreness with yard work.        Depression Assessment Review:  PHQ-9 Total Score:    Vital Signs & Measurements Taken This Encounter  /62 (BP Location: Left arm, Patient Position: Sitting, Cuff Size: Adult)   Pulse 69   Temp 97.5 °F (36.4 °C) (Temporal)   Wt 74 kg (163 lb 3.2 oz)   SpO2 99%   BMI 23.42 kg/m²    SpO2 Percentage    23 1022   SpO2: 99%        BMI is within normal parameters. No other follow-up for BMI required.      Physical Exam  Vitals reviewed.   Constitutional:       General: He is not in acute distress.  HENT:      Head: Normocephalic and atraumatic.   Eyes:      General: No scleral icterus.     Extraocular Movements: Extraocular movements intact.      Conjunctiva/sclera: Conjunctivae normal.      Pupils: Pupils are equal, round, and reactive to light.   Cardiovascular:      Rate and Rhythm: Normal rate and regular rhythm.   Pulmonary:      Effort: Pulmonary effort is normal. No respiratory distress.      Breath sounds: Normal breath sounds. No wheezing or rhonchi.   Musculoskeletal:         General: No swelling.      Cervical back: Neck supple. No tenderness.   Lymphadenopathy:      Cervical: No  cervical adenopathy.   Skin:     General: Skin is warm and dry.      Coloration: Skin is not jaundiced.   Neurological:      Mental Status: He is alert.   Psychiatric:         Mood and Affect: Mood normal.         Behavior: Behavior normal.         Thought Content: Thought content normal.         Judgment: Judgment normal.              Assessment & Plan  Patient Active Problem List   Diagnosis   • Anemia   • Chronic coronary artery disease   • Chronic osteoarthritis   • Depression   • Elevated prostate specific antigen (PSA)   • Enlarged prostate   • Hyperlipidemia   • Essential hypertension   • Iron deficiency   • Low back pain   • Malignant neoplasm of prostate   • Prostate cancer   • Mild cognitive impairment with memory loss   • Stage 3 chronic kidney disease   • Spinal stenosis of lumbar region   • DDD (degenerative disc disease), lumbar   • Diverticulosis   • Gastroesophageal reflux disease without esophagitis   • Nontraumatic compression fracture of L1 vertebra   • Age-related osteoporosis with current pathological fracture with delayed healing   • T12 compression fracture, with delayed healing, subsequent encounter   • Diarrhea   • Paroxysmal atrial fibrillation   • Compression fracture of L1 lumbar vertebra       ICD-10-CM ICD-9-CM   1. T12 compression fracture, with delayed healing, subsequent encounter  S22.080G V54.17   2. Compression fracture of L1 vertebra, initial encounter  S32.010A 805.4   3. Non-traumatic compression fracture of L1 lumbar vertebra with delayed healing, subsequent encounter  M48.56XG V54.27   4. Age-related osteoporosis with current pathological fracture with delayed healing  M80.00XG V54.29     733.01   5. Mixed hyperlipidemia  E78.2 272.2   6. Essential hypertension  I10 401.9   7. Stage 3b chronic kidney disease  N18.32 585.3     Orders Placed This Encounter   Procedures   • CBC (No Diff)     Standing Status:   Future     Standing Expiration Date:   5/2/2024     Order Specific  Question:   Release to patient     Answer:   Routine Release   • Comprehensive Metabolic Panel     Standing Status:   Future     Standing Expiration Date:   5/2/2024     Order Specific Question:   Release to patient     Answer:   Routine Release   • Lipid Panel     Standing Status:   Future     Standing Expiration Date:   5/2/2024   • TSH     Standing Status:   Future     Standing Expiration Date:   5/2/2024     Order Specific Question:   Release to patient     Answer:   Routine Release   • T4, Free     Standing Status:   Future     Standing Expiration Date:   5/2/2024   • CK     Standing Status:   Future     Standing Expiration Date:   5/2/2024     Order Specific Question:   Release to patient     Answer:   Routine Release       Meds Ordered During Visit:  New Medications Ordered This Visit   Medications   • tiZANidine (ZANAFLEX) 2 MG tablet     Sig: Take 1 tablet by mouth 2 (Two) Times a Day As Needed for Muscle Spasms.     Dispense:  60 tablet     Refill:  2     I reviewed neurosurgery note and PDMP.  We will continue Zanaflex as needed for muscle pain.  We will continue his current medicine regimen.  I offered physical therapy but patient declined as he did not feel he needed it.  We will plan to update labs as above just prior to next appointment.    Return in about 4 months (around 9/2/2023), or if symptoms worsen or fail to improve, for Medicare Wellness, Labs Prior.          Referring Provider (if known): No ref. provider found      This document has been electronically signed by Laura Fulton DO  May 2, 2023 11:44 EDT    Laura Fulton DO, FACOI  [address] [place] [address] [phone] (office)    Part of this note may be an electronic transcription/translation of spoken language to printed text using the Dragon Dictation System.

## 2023-05-08 DIAGNOSIS — K21.9 GASTROESOPHAGEAL REFLUX DISEASE WITHOUT ESOPHAGITIS: Chronic | ICD-10-CM

## 2023-05-08 RX ORDER — ESOMEPRAZOLE MAGNESIUM 40 MG/1
40 CAPSULE, DELAYED RELEASE ORAL
Qty: 90 CAPSULE | Refills: 3 | Status: SHIPPED | OUTPATIENT
Start: 2023-05-08

## 2023-06-14 RX ORDER — CLOPIDOGREL BISULFATE 75 MG/1
75 TABLET ORAL DAILY
Qty: 90 TABLET | Refills: 1 | Status: SHIPPED | OUTPATIENT
Start: 2023-06-14

## 2023-08-07 ENCOUNTER — CLINICAL SUPPORT (OUTPATIENT)
Dept: FAMILY MEDICINE CLINIC | Facility: CLINIC | Age: 88
End: 2023-08-07
Payer: MEDICARE

## 2023-08-07 ENCOUNTER — TRANSCRIBE ORDERS (OUTPATIENT)
Dept: FAMILY MEDICINE CLINIC | Facility: CLINIC | Age: 88
End: 2023-08-07
Payer: MEDICARE

## 2023-08-07 DIAGNOSIS — N18.32 STAGE 3B CHRONIC KIDNEY DISEASE (CKD): ICD-10-CM

## 2023-08-07 DIAGNOSIS — N18.32 STAGE 3B CHRONIC KIDNEY DISEASE (CKD): Primary | ICD-10-CM

## 2023-08-07 PROCEDURE — 36415 COLL VENOUS BLD VENIPUNCTURE: CPT | Performed by: INTERNAL MEDICINE

## 2023-08-07 PROCEDURE — 80048 BASIC METABOLIC PNL TOTAL CA: CPT | Performed by: INTERNAL MEDICINE

## 2023-08-07 NOTE — PROGRESS NOTES
Venipuncture Blood Specimen Collection  Venipuncture performed in LEFT ARM by Esperanza Loya RN with good hemostasis. Patient tolerated the procedure well without complications.   08/07/23   Esperanza Loya RN

## 2023-08-08 LAB
ANION GAP SERPL CALCULATED.3IONS-SCNC: 11.5 MMOL/L (ref 5–15)
BUN SERPL-MCNC: 24 MG/DL (ref 8–23)
BUN/CREAT SERPL: 14.7 (ref 7–25)
CALCIUM SPEC-SCNC: 8.8 MG/DL (ref 8.6–10.5)
CHLORIDE SERPL-SCNC: 108 MMOL/L (ref 98–107)
CO2 SERPL-SCNC: 25.5 MMOL/L (ref 22–29)
CREAT SERPL-MCNC: 1.63 MG/DL (ref 0.76–1.27)
EGFRCR SERPLBLD CKD-EPI 2021: 40.8 ML/MIN/1.73
GLUCOSE SERPL-MCNC: 127 MG/DL (ref 65–99)
POTASSIUM SERPL-SCNC: 4.5 MMOL/L (ref 3.5–5.2)
SODIUM SERPL-SCNC: 145 MMOL/L (ref 136–145)

## 2023-08-10 DIAGNOSIS — N40.0 BPH WITHOUT OBSTRUCTION/LOWER URINARY TRACT SYMPTOMS: ICD-10-CM

## 2023-08-10 DIAGNOSIS — C61 PROSTATE CANCER: ICD-10-CM

## 2023-08-10 RX ORDER — TAMSULOSIN HYDROCHLORIDE 0.4 MG/1
1 CAPSULE ORAL DAILY
Qty: 90 CAPSULE | Refills: 1 | Status: SHIPPED | OUTPATIENT
Start: 2023-08-10

## 2023-09-05 DIAGNOSIS — C61 PROSTATE CANCER: Primary | ICD-10-CM

## 2023-09-06 ENCOUNTER — CLINICAL SUPPORT (OUTPATIENT)
Dept: FAMILY MEDICINE CLINIC | Facility: CLINIC | Age: 88
End: 2023-09-06
Payer: MEDICARE

## 2023-09-06 DIAGNOSIS — N18.32 STAGE 3B CHRONIC KIDNEY DISEASE: Chronic | ICD-10-CM

## 2023-09-06 DIAGNOSIS — E78.2 MIXED HYPERLIPIDEMIA: Chronic | ICD-10-CM

## 2023-09-06 DIAGNOSIS — I10 ESSENTIAL HYPERTENSION: Chronic | ICD-10-CM

## 2023-09-06 DIAGNOSIS — D64.9 ANEMIA, UNSPECIFIED TYPE: ICD-10-CM

## 2023-09-06 PROCEDURE — 80053 COMPREHEN METABOLIC PANEL: CPT | Performed by: NURSE PRACTITIONER

## 2023-09-06 PROCEDURE — 84439 ASSAY OF FREE THYROXINE: CPT | Performed by: NURSE PRACTITIONER

## 2023-09-06 PROCEDURE — 85027 COMPLETE CBC AUTOMATED: CPT | Performed by: INTERNAL MEDICINE

## 2023-09-06 PROCEDURE — 82728 ASSAY OF FERRITIN: CPT | Performed by: NURSE PRACTITIONER

## 2023-09-06 PROCEDURE — 80061 LIPID PANEL: CPT | Performed by: NURSE PRACTITIONER

## 2023-09-06 PROCEDURE — 84153 ASSAY OF PSA TOTAL: CPT | Performed by: NURSE PRACTITIONER

## 2023-09-06 PROCEDURE — 36415 COLL VENOUS BLD VENIPUNCTURE: CPT | Performed by: INTERNAL MEDICINE

## 2023-09-06 PROCEDURE — 84443 ASSAY THYROID STIM HORMONE: CPT | Performed by: NURSE PRACTITIONER

## 2023-09-06 PROCEDURE — 84466 ASSAY OF TRANSFERRIN: CPT | Performed by: NURSE PRACTITIONER

## 2023-09-06 PROCEDURE — 83540 ASSAY OF IRON: CPT | Performed by: NURSE PRACTITIONER

## 2023-09-06 PROCEDURE — 82550 ASSAY OF CK (CPK): CPT | Performed by: NURSE PRACTITIONER

## 2023-09-06 NOTE — PROGRESS NOTES
Venipuncture Blood Specimen Collection  Venipuncture performed in Left arm by Lauren Jiménez MA with good hemostasis. Patient tolerated the procedure well without complications.   09/06/23   Lauren Jiménez MA

## 2023-09-07 DIAGNOSIS — D64.9 ANEMIA, UNSPECIFIED TYPE: Primary | ICD-10-CM

## 2023-09-07 LAB
ALBUMIN SERPL-MCNC: 3.7 G/DL (ref 3.5–5.2)
ALBUMIN/GLOB SERPL: 1.9 G/DL
ALP SERPL-CCNC: 35 U/L (ref 39–117)
ALT SERPL W P-5'-P-CCNC: 17 U/L (ref 1–41)
ANION GAP SERPL CALCULATED.3IONS-SCNC: 10 MMOL/L (ref 5–15)
AST SERPL-CCNC: 25 U/L (ref 1–40)
BILIRUB SERPL-MCNC: 0.2 MG/DL (ref 0–1.2)
BUN SERPL-MCNC: 22 MG/DL (ref 8–23)
BUN/CREAT SERPL: 15.4 (ref 7–25)
CALCIUM SPEC-SCNC: 8.7 MG/DL (ref 8.6–10.5)
CHLORIDE SERPL-SCNC: 110 MMOL/L (ref 98–107)
CHOLEST SERPL-MCNC: 141 MG/DL (ref 0–200)
CK SERPL-CCNC: 51 U/L (ref 20–200)
CO2 SERPL-SCNC: 24 MMOL/L (ref 22–29)
CREAT SERPL-MCNC: 1.43 MG/DL (ref 0.76–1.27)
DEPRECATED RDW RBC AUTO: 49 FL (ref 37–54)
EGFRCR SERPLBLD CKD-EPI 2021: 47.7 ML/MIN/1.73
ERYTHROCYTE [DISTWIDTH] IN BLOOD BY AUTOMATED COUNT: 13.9 % (ref 12.3–15.4)
FERRITIN SERPL-MCNC: 135 NG/ML (ref 30–400)
GLOBULIN UR ELPH-MCNC: 2 GM/DL
GLUCOSE SERPL-MCNC: 95 MG/DL (ref 65–99)
HCT VFR BLD AUTO: 26.6 % (ref 37.5–51)
HDLC SERPL-MCNC: 42 MG/DL (ref 40–60)
HGB BLD-MCNC: 8.4 G/DL (ref 13–17.7)
IRON 24H UR-MRATE: 49 MCG/DL (ref 59–158)
IRON SATN MFR SERPL: 19 % (ref 20–50)
LDLC SERPL CALC-MCNC: 69 MG/DL (ref 0–100)
LDLC/HDLC SERPL: 1.52 {RATIO}
MCH RBC QN AUTO: 30.4 PG (ref 26.6–33)
MCHC RBC AUTO-ENTMCNC: 31.6 G/DL (ref 31.5–35.7)
MCV RBC AUTO: 96.4 FL (ref 79–97)
PLATELET # BLD AUTO: 156 10*3/MM3 (ref 140–450)
PMV BLD AUTO: 11.7 FL (ref 6–12)
POTASSIUM SERPL-SCNC: 4 MMOL/L (ref 3.5–5.2)
PROT SERPL-MCNC: 5.7 G/DL (ref 6–8.5)
PSA SERPL-MCNC: <0.014 NG/ML (ref 0–4)
RBC # BLD AUTO: 2.76 10*6/MM3 (ref 4.14–5.8)
SODIUM SERPL-SCNC: 144 MMOL/L (ref 136–145)
T4 FREE SERPL-MCNC: 1 NG/DL (ref 0.93–1.7)
TIBC SERPL-MCNC: 259 MCG/DL (ref 298–536)
TRANSFERRIN SERPL-MCNC: 174 MG/DL (ref 200–360)
TRIGL SERPL-MCNC: 175 MG/DL (ref 0–150)
TSH SERPL DL<=0.05 MIU/L-ACNC: 3.47 UIU/ML (ref 0.27–4.2)
VLDLC SERPL-MCNC: 30 MG/DL (ref 5–40)
WBC NRBC COR # BLD: 8.37 10*3/MM3 (ref 3.4–10.8)

## 2023-09-11 ENCOUNTER — OFFICE VISIT (OUTPATIENT)
Dept: FAMILY MEDICINE CLINIC | Facility: CLINIC | Age: 88
End: 2023-09-11
Payer: MEDICARE

## 2023-09-11 ENCOUNTER — OFFICE VISIT (OUTPATIENT)
Dept: UROLOGY | Facility: CLINIC | Age: 88
End: 2023-09-11
Payer: MEDICARE

## 2023-09-11 VITALS
OXYGEN SATURATION: 99 % | BODY MASS INDEX: 23.25 KG/M2 | TEMPERATURE: 97.3 F | DIASTOLIC BLOOD PRESSURE: 72 MMHG | HEIGHT: 70 IN | WEIGHT: 162.4 LBS | SYSTOLIC BLOOD PRESSURE: 136 MMHG | HEART RATE: 64 BPM

## 2023-09-11 VITALS
SYSTOLIC BLOOD PRESSURE: 153 MMHG | HEIGHT: 70 IN | WEIGHT: 163.2 LBS | BODY MASS INDEX: 23.37 KG/M2 | HEART RATE: 71 BPM | DIASTOLIC BLOOD PRESSURE: 62 MMHG

## 2023-09-11 DIAGNOSIS — N40.0 BPH WITHOUT OBSTRUCTION/LOWER URINARY TRACT SYMPTOMS: ICD-10-CM

## 2023-09-11 DIAGNOSIS — N28.1 BILATERAL RENAL CYSTS: ICD-10-CM

## 2023-09-11 DIAGNOSIS — N40.0 ENLARGED PROSTATE: ICD-10-CM

## 2023-09-11 DIAGNOSIS — C61 PROSTATE CANCER: Primary | ICD-10-CM

## 2023-09-11 DIAGNOSIS — D64.9 ANEMIA, UNSPECIFIED TYPE: ICD-10-CM

## 2023-09-11 DIAGNOSIS — U07.1 COVID-19: Primary | ICD-10-CM

## 2023-09-11 DIAGNOSIS — R35.0 FREQUENCY OF MICTURITION: ICD-10-CM

## 2023-09-11 DIAGNOSIS — R33.9 INCOMPLETE EMPTYING OF BLADDER: ICD-10-CM

## 2023-09-11 LAB
BILIRUB BLD-MCNC: ABNORMAL MG/DL
CLARITY, POC: CLEAR
COLOR UR: YELLOW
EXPIRATION DATE: ABNORMAL
EXPIRATION DATE: ABNORMAL
FLUAV AG UPPER RESP QL IA.RAPID: NOT DETECTED
FLUBV AG UPPER RESP QL IA.RAPID: NOT DETECTED
GLUCOSE UR STRIP-MCNC: NEGATIVE MG/DL
INTERNAL CONTROL: ABNORMAL
KETONES UR QL: NEGATIVE
LEUKOCYTE EST, POC: NEGATIVE
Lab: ABNORMAL
Lab: ABNORMAL
NITRITE UR-MCNC: NEGATIVE MG/ML
PH UR: 5.5 [PH] (ref 5–8)
PROT UR STRIP-MCNC: ABNORMAL MG/DL
RBC # UR STRIP: NEGATIVE /UL
SARS-COV-2 AG UPPER RESP QL IA.RAPID: DETECTED
SP GR UR: 1.02 (ref 1–1.03)
UROBILINOGEN UR QL: NORMAL

## 2023-09-11 PROCEDURE — 82607 VITAMIN B-12: CPT | Performed by: INTERNAL MEDICINE

## 2023-09-11 PROCEDURE — 82746 ASSAY OF FOLIC ACID SERUM: CPT | Performed by: INTERNAL MEDICINE

## 2023-09-11 PROCEDURE — 1159F MED LIST DOCD IN RCRD: CPT | Performed by: INTERNAL MEDICINE

## 2023-09-11 PROCEDURE — 81003 URINALYSIS AUTO W/O SCOPE: CPT | Performed by: NURSE PRACTITIONER

## 2023-09-11 PROCEDURE — 1160F RVW MEDS BY RX/DR IN RCRD: CPT | Performed by: INTERNAL MEDICINE

## 2023-09-11 PROCEDURE — 99214 OFFICE O/P EST MOD 30 MIN: CPT | Performed by: NURSE PRACTITIONER

## 2023-09-11 PROCEDURE — 1160F RVW MEDS BY RX/DR IN RCRD: CPT | Performed by: NURSE PRACTITIONER

## 2023-09-11 PROCEDURE — 99214 OFFICE O/P EST MOD 30 MIN: CPT | Performed by: INTERNAL MEDICINE

## 2023-09-11 PROCEDURE — 87428 SARSCOV & INF VIR A&B AG IA: CPT | Performed by: INTERNAL MEDICINE

## 2023-09-11 PROCEDURE — 87086 URINE CULTURE/COLONY COUNT: CPT | Performed by: NURSE PRACTITIONER

## 2023-09-11 PROCEDURE — 1159F MED LIST DOCD IN RCRD: CPT | Performed by: NURSE PRACTITIONER

## 2023-09-11 RX ORDER — TAMSULOSIN HYDROCHLORIDE 0.4 MG/1
1 CAPSULE ORAL DAILY
Qty: 90 CAPSULE | Refills: 3 | Status: SHIPPED | OUTPATIENT
Start: 2023-09-11

## 2023-09-11 RX ORDER — ACETAMINOPHEN,DIPHENHYDRAMINE HCL 500; 25 MG/1; MG/1
1 TABLET, FILM COATED ORAL
COMMUNITY

## 2023-09-11 RX ORDER — HYDRALAZINE HYDROCHLORIDE 10 MG/1
10 TABLET, FILM COATED ORAL 3 TIMES DAILY
COMMUNITY

## 2023-09-11 RX ORDER — METHYLPREDNISOLONE 4 MG/1
TABLET ORAL
Qty: 21 EACH | Refills: 0 | Status: SHIPPED | OUTPATIENT
Start: 2023-09-11

## 2023-09-11 RX ORDER — PSEUDOEPHEDRINE HCL 30 MG
100 TABLET ORAL
COMMUNITY

## 2023-09-11 RX ORDER — HYDROCHLOROTHIAZIDE 12.5 MG/1
12.5 CAPSULE, GELATIN COATED ORAL DAILY
COMMUNITY

## 2023-09-11 NOTE — PROGRESS NOTES
Patient Name: Rubén Rivas Today's Date: 2023   Patient MRN / CSN: [id number] / [id number] Date of Encounter: 2023   Patient Age / : 87 y.o. / 1935 Encounter Provider: Laura Fulton DO   Referring Physician: No ref. provider found          Rubén is a 87 y.o. male who is being seen today for URI      URI   This is a new problem. The current episode started today. There has been no fever. Associated symptoms include congestion. Pertinent negatives include no abdominal pain, chest pain or headaches. Associated symptoms comments: Patient reports that he really does not feel bad but he went to his urologist today, who told him that she heard congestion in his lungs. He has tried nothing for the symptoms.     Allergies include:Sulfa antibiotics  Current Outpatient Medications   Medication Sig Dispense Refill    alendronate (Fosamax) 70 MG tablet Take 1 tablet by mouth Every 7 (Seven) Days. 4 tablet 6    aspirin 81 MG tablet Take  by mouth daily.      Budesonide (ENTOCORT EC) 3 MG 24 hr capsule Take 2 capsules by mouth Daily.      clopidogrel (PLAVIX) 75 MG tablet TAKE 1 TABLET BY MOUTH DAILY. 90 tablet 1    colesevelam (WELCHOL) 625 MG tablet Take 1 tablet by mouth 2 (Two) Times a Day.      diphenhydrAMINE-acetaminophen (TYLENOL PM)  MG tablet per tablet Take 1 tablet by mouth.      diphenoxylate-atropine (Lomotil) 2.5-0.025 MG per tablet Take 1 tablet by mouth 4 (Four) Times a Day As Needed for Diarrhea. 20 tablet 0    docusate sodium 100 MG capsule Take 1 capsule by mouth.      donepezil (Aricept) 10 MG tablet Take 2 tabs po nightly. 180 tablet 3    escitalopram (LEXAPRO) 20 MG tablet Take 1 tablet by mouth once daily 90 tablet 3    esomeprazole (nexIUM) 40 MG capsule TAKE 1 CAPSULE BY MOUTH EVERY MORNING BEFORE BREAKFAST. 90 capsule 3    FIBER PO Take  by mouth Daily.      Glucose Blood (ONETOUCH ULTRA BLUE VI) daily.      hydrALAZINE (APRESOLINE) 10 MG tablet Take 1 tablet by mouth 3 (Three)  Times a Day.      hydroCHLOROthiazide (MICROZIDE) 12.5 MG capsule Take 1 capsule by mouth Daily.      losartan (COZAAR) 25 MG tablet       metoprolol tartrate (LOPRESSOR) 25 MG tablet Take 1 tablet by mouth 2 (Two) Times a Day. 180 tablet 1    Multiple Vitamins-Minerals (ONE-A-DAY MENS 50+ ADVANTAGE PO) Take  by mouth daily.      mupirocin (BACTROBAN) 2 % ointment Apply  topically to the appropriate area as directed 3 (Three) Times a Day. 15 g 1    rosuvastatin (CRESTOR) 20 MG tablet Take 1 tablet by mouth Daily. 90 tablet 3    tamsulosin (FLOMAX) 0.4 MG capsule 24 hr capsule Take 1 capsule by mouth Daily. 90 capsule 3    tiZANidine (ZANAFLEX) 2 MG tablet Take 1 tablet by mouth 2 (Two) Times a Day As Needed for Muscle Spasms. 60 tablet 2    traMADol (ULTRAM) 50 MG tablet Take 1 tablet by mouth Every 6 (Six) Hours As Needed for Moderate Pain.      vitamin B-12 (CYANOCOBALAMIN) 1000 MCG tablet Take 1 tablet by mouth Daily.      methylPREDNISolone (MEDROL) 4 MG dose pack Take as directed on package instructions. 21 each 0    Molnupiravir (LAGEVRIO) 200 MG capsule Take 4 capsules by mouth Every 12 (Twelve) Hours for 5 days. 40 capsule 0     No current facility-administered medications for this visit.     Past Medical History:   Diagnosis Date    Anemia     Arthritis     Depression     Hyperlipidemia     Hypertension     Prostate cancer 2013    Stage 3 chronic kidney disease 1/4/2021     Family History   Problem Relation Age of Onset    Cancer Mother 35        breast    Heart attack Father     Cancer Sister         cervical    Heart attack Brother     Heart disease Brother     Cancer Brother         Prostate     Past Surgical History:   Procedure Laterality Date    BACK SURGERY      CARDIAC SURGERY      COLONOSCOPY      HERNIA REPAIR      LAPAROSCOPIC CHOLECYSTECTOMY  08/21/2020    PERFORMED AT [facility]    STOMACH SURGERY       Social History     Substance and Sexual Activity   Alcohol Use No     Social History  "    Tobacco Use   Smoking Status Former    Packs/day: 0.25    Years: 15.00    Pack years: 3.75    Types: Cigarettes    Quit date: 1976    Years since quittin.1   Smokeless Tobacco Never     Social History     Substance and Sexual Activity   Drug Use No     Review of Systems   HENT:  Positive for congestion.    Cardiovascular:  Negative for chest pain.   Gastrointestinal:  Negative for abdominal pain.   Neurological:  Negative for headaches.      Depression Assessment Review:  PHQ-9 Total Score:    Vital Signs & Measurements Taken This Encounter  /72 (BP Location: Left arm, Patient Position: Sitting, Cuff Size: Adult)   Pulse 64   Temp 97.3 °F (36.3 °C) (Temporal)   Ht 177.8 cm (70\")   Wt 73.7 kg (162 lb 6.4 oz)   SpO2 99%   BMI 23.30 kg/m²    SpO2 Percentage    23 1404   SpO2: 99%        BMI is within normal parameters. No other follow-up for BMI required.      Physical Exam  Vitals reviewed.   Constitutional:       General: He is not in acute distress.  HENT:      Head: Normocephalic and atraumatic.      Right Ear: Tympanic membrane normal.      Left Ear: Tympanic membrane normal.   Eyes:      General: No scleral icterus.     Extraocular Movements: Extraocular movements intact.      Conjunctiva/sclera: Conjunctivae normal.      Pupils: Pupils are equal, round, and reactive to light.   Cardiovascular:      Rate and Rhythm: Normal rate and regular rhythm.   Pulmonary:      Effort: Pulmonary effort is normal. No respiratory distress.      Breath sounds: Rhonchi present. No wheezing.   Musculoskeletal:         General: No swelling.      Cervical back: Neck supple. No tenderness.   Lymphadenopathy:      Cervical: No cervical adenopathy.   Skin:     General: Skin is warm and dry.      Coloration: Skin is not jaundiced.   Neurological:      Mental Status: He is alert.   Psychiatric:         Mood and Affect: Mood normal.         Behavior: Behavior normal.            Assessment & Plan  Patient " Active Problem List   Diagnosis    Anemia    Chronic coronary artery disease    Chronic osteoarthritis    Depression    Enlarged prostate    Hyperlipidemia    Essential hypertension    Iron deficiency    Low back pain    Prostate cancer    Mild cognitive impairment with memory loss    Stage 3 chronic kidney disease    Spinal stenosis of lumbar region    DDD (degenerative disc disease), lumbar    Diverticulosis    Gastroesophageal reflux disease without esophagitis    Nontraumatic compression fracture of L1 vertebra    Age-related osteoporosis with current pathological fracture with delayed healing    T12 compression fracture, with delayed healing, subsequent encounter    Paroxysmal atrial fibrillation    Compression fracture of L1 lumbar vertebra    Bilateral renal cysts    Frequency of micturition       ICD-10-CM ICD-9-CM   1. COVID-19  U07.1 079.89     Orders Placed This Encounter   Procedures    POCT SARS-CoV-2 Antigen ADAN + Flu     Order Specific Question:   Release to patient     Answer:   Routine Release [[id number]]       Meds Ordered During Visit:  New Medications Ordered This Visit   Medications    Molnupiravir (LAGEVRIO) 200 MG capsule     Sig: Take 4 capsules by mouth Every 12 (Twelve) Hours for 5 days.     Dispense:  40 capsule     Refill:  0     Order Specific Question:   Reviewed the following with the patient which is required under EUA: Verbal consent obtained, confirmed the patient is >/= 12 years AND weighs >/= 40 kg, informed of alternative therapies available, and that this drug is authorized for use under EUA.     Answer:   Yes     Order Specific Question:   Patient has been instructed individuals of childbearing potential use a reliable method of contraception for the duration of treatment and for 4 days after the last dose of molnupiravir for females, and at least 3 months after the last dose for males.     Answer:   Patient Counseled     Comments:   n/a     Order Specific Question:   Does the  patient require hospitalization due to severe or critical COVID-19?     Answer:   No     Order Specific Question:   Does the patient have a confirmed COVID-19 infection, are within 5 days of symptom onset, AND are at high risk for progression to severe COVID-19, including hospitalization or death?     Answer:   Yes     Order Specific Question:   Patient must be considered high risk for progressing to severe COVID-19. Please select all of the following high risk criteria that apply. (SEE LINK ABOVE for list of immunocompromising conditions)     Answer:   Age >/= 65 years    methylPREDNISolone (MEDROL) 4 MG dose pack     Sig: Take as directed on package instructions.     Dispense:  21 each     Refill:  0     Patient was positive for COVID and negative for flu on today's evaluation.  He has just become aware of the symptoms today.  I discussed Lagevrio and Medrol for patient to start today for acute COVID infection.  We will hold on Paxlovid therapy due to its interactions with Plavix and Crestor.  I discussed CDC guidelines for quarantine with patient today.    Return if symptoms worsen or fail to improve, for Recheck, Next scheduled follow up.          Referring Provider (if known): No ref. provider found      This document has been electronically signed by Laura Fulton DO  September 11, 2023 15:47 EDT    Laura Fulton DO, FACOI  [address] [place] [address] [phone] (office)    Part of this note may be an electronic transcription/translation of spoken language to printed text using the Dragon Dictation System.

## 2023-09-11 NOTE — PROGRESS NOTES
Chief Complaint  Prostate Cancer (YEARLY FOLLOW UP PROSTATE CANCER/ BILATERAL RENAL CYST)    Subjective          Rubén Rivas presents to [facility] for PROSTATE CA / BPH WITH FREQ/RENAL CYST  History of Present Illness    Mr. Rubén Rivas is a very pleasant 87-year-old male who returns to clinic today for evaluation accompanied by his caregiver-son-in-law -TRACY. This is his yearly follow-up with prior concerns of prostate cancer, BPH with lower urinary tract symptoms and a bilateral renal cysts,  besides new bouts of urine frequency secondary to diuretic with Lasix.     THE Patient is in no apparent discomfort today and reports doing relatively well this last year. His most recent PSA is <0.014 and this was completed on 09/06/2023. His PSA prior was less than 0.014 on 08/23/2022. It was less than 0.014 the year prior in 2021. His PSA the last 3 years have been very consistent with a prostate cancer resolution. THE Patient is currently on Flomax daily for BPH with lower urinary tract symptoms,  Initially which was urine hesitanc and occasional straining with urination-resolved on Flomax.  Today, he, reports tolerating his tamsulosin well and denies any dizzy spells.  Order however, denies any side effects from the medication.     Mr. Rubén Rivas was diagnosed with prostate cancer stage T2b in 2014. He had 12/12 biopsy positive for Elliston score of 7 all cores. He was treated with radiation therapy that finished in 2015. Post therapy, he had 2 years of Lupron injections. His last Lupron was in 09/2016. He has done relatively well and not needed any Lupron injections.  During his last evaluation, he denied having any more hot flashes, bone pain, denied increased fatigue. He denied any side effects whatsoever. He is doing relatively well for his age and is very pleased.     He, however, currently sees Dr. Mix for stage III chronic kidney disease. Recently had labs completed on  "09/06/2023 with a BUN of 22. His creatinine was 1.43 with an eGFR of 47.7. Patient's most recent urine sample completed today is negative for leukocyte esterase, is negative for nitrites, is negative for growth/microscopic hematuria. He has 3+ protein. His PVR is 0 with an IPSS score of 5. He is very high spirited with a very strong fide. He has not had any recurrent UTIs in the past and denied perineal pain or discomfort consistent with prostatitis. He has chronic lower back pain, but denies flank pain but denies any CVA tenderness.     Patient also has a bilateral renal cyst. Reviewed his last CT abdomen and pelvis completed on 11/20/2022 showing a bilateral renal cyst, some of which appear to be high density cyst. Overall, the cyst is very similar to his previous exams. Patient had no obstructive uropathy and a bladder that was suggestive of cystitis. Overall, he is asymptomatic.    The patient is doing well. He denies any urinary symptoms, frequency, urgency, or burning. He denies any pain in his rectum. According to the patient's caregiver -daughter Aleksandra via phone, Dr. Fulton would like him to have his hemoglobin checked today.  Also folate levels and vitamin B12. Dr. Mix started the patient on a blood pressure medication with a diuretic in it. When the patient got better, he started to swell out of his kidney. Dr. Mix took him off the diuretic for a couple of weeks.     According to the patient's caregiver the patient is taking several blood pressure medications. Dr. Mix was hoping that the medication would bring his blood pressure down, but if it is \"creeping\" back up, systolic greater than 150s /62 today.  Dr. Mix will notify him all changes of medications.  According to the patient's caregiver he has been having trouble with his blood pressure all summer. He has been to Dr. Mix several times. He sees Cynthia at [facility].    Jeffery patient feeling congested with audible rails in clinic today.  " Patient's daughter Also reports that everyone in the house has been sick. Dr. Fulton wants him to have his folate, B12, and occult blood in his stool. He has some major colitis issues and hemorrhoids.      IMPRESSION CT 11/20/22:   1. Appearance suggestive of cystitis. No obstructive uropathy or urolithiasis.  2.Compression fractures of T12 and L1 which is new at L1 and increased at T12  3. Kidneys/ureters: Bilateral renal cysts are present. Some appear to be high density cysts. Overall very heavy appearance very similar to the previous exam. No obstructive uropathy or urolithiasis    Active Ambulatory Problems     Diagnosis Date Noted    Anemia 07/18/2016    Chronic coronary artery disease 07/18/2016    Chronic osteoarthritis 07/18/2016    Depression 07/18/2016    Enlarged prostate 07/18/2016    Hyperlipidemia 07/18/2016    Essential hypertension 07/18/2016    Iron deficiency 07/18/2016    Low back pain 07/18/2016    Prostate cancer 09/01/2016    Mild cognitive impairment with memory loss 02/03/2020    Stage 3 chronic kidney disease 01/04/2021    Spinal stenosis of lumbar region 03/10/2021    DDD (degenerative disc disease), lumbar 03/10/2021    Diverticulosis 09/20/2021    Gastroesophageal reflux disease without esophagitis 10/13/2022    Nontraumatic compression fracture of L1 vertebra 12/15/2022    Age-related osteoporosis with current pathological fracture with delayed healing 02/15/2023    T12 compression fracture, with delayed healing, subsequent encounter 02/15/2023    Paroxysmal atrial fibrillation 02/15/2023    Compression fracture of L1 lumbar vertebra 02/15/2023    Bilateral renal cysts 09/11/2023    Frequency of micturition 09/11/2023     Resolved Ambulatory Problems     Diagnosis Date Noted    Acute sinusitis 07/18/2016    Elevated prostate specific antigen (PSA) 07/18/2016    Excessive cerumen in ear canal 07/18/2016    Malignant neoplasm of prostate 07/18/2016    Rheumatoid arthritis 07/18/2016     "Diarrhea 02/15/2023     Past Medical History:   Diagnosis Date    Arthritis     Hypertension       Objective   Vital Signs:   /62 (BP Location: Left arm, Patient Position: Sitting, Cuff Size: Adult)   Pulse 71   Ht 177.8 cm (70\")   Wt 74 kg (163 lb 3.2 oz)   BMI 23.42 kg/m²       ROS:   Review of Systems   Constitutional:  Positive for activity change, appetite change and fatigue. Negative for chills, diaphoresis, fever, unexpected weight gain and unexpected weight loss.   HENT:  Negative for congestion, ear discharge, ear pain, nosebleeds, rhinorrhea, sinus pressure and sore throat.    Eyes:  Negative for blurred vision, double vision, photophobia, pain, redness and visual disturbance.   Respiratory:  Negative for apnea, cough, chest tightness, shortness of breath, wheezing and stridor.    Cardiovascular:  Negative for chest pain and palpitations.   Gastrointestinal:  Positive for abdominal distention. Negative for abdominal pain, constipation, diarrhea, nausea and vomiting.   Endocrine: Negative for polydipsia, polyphagia and polyuria.   Genitourinary:  Positive for frequency. Negative for decreased urine volume, difficulty urinating, discharge, dysuria, flank pain, genital sores, hematuria, penile pain, penile swelling, scrotal swelling, testicular pain, urgency and urinary incontinence.   Musculoskeletal:  Positive for gait problem, joint swelling and myalgias. Negative for arthralgias and back pain.   Skin:  Positive for color change and pallor. Negative for rash and wound.   Neurological:  Negative for dizziness, tremors, syncope, weakness, light-headedness, memory problem and confusion.   Psychiatric/Behavioral:  Positive for sleep disturbance and stress. Negative for agitation, behavioral problems and decreased concentration.       Physical Exam  Constitutional:       General: He is in acute distress.      Appearance: He is well-developed. He is ill-appearing.   HENT:      Head: Normocephalic and " atraumatic.      Right Ear: External ear normal.      Left Ear: External ear normal.   Eyes:      General:         Right eye: No discharge.         Left eye: No discharge.      Conjunctiva/sclera: Conjunctivae normal.      Pupils: Pupils are equal, round, and reactive to light.   Neck:      Thyroid: No thyromegaly.      Trachea: No tracheal deviation.   Cardiovascular:      Rate and Rhythm: Normal rate and regular rhythm.      Heart sounds: No murmur heard.    No friction rub.   Pulmonary:      Effort: Pulmonary effort is normal. No respiratory distress.      Breath sounds: Normal breath sounds. No stridor.   Abdominal:      General: Bowel sounds are normal. There is distension.      Palpations: Abdomen is soft.      Tenderness: There is abdominal tenderness. There is no guarding.   Genitourinary:     Penis: Normal and uncircumcised. No tenderness or discharge.       Testes: Normal.      Rectum: Normal. Guaiac result negative.   Musculoskeletal:         General: Tenderness present. No deformity. Normal range of motion.      Cervical back: Normal range of motion and neck supple.   Skin:     General: Skin is warm and dry.      Capillary Refill: Capillary refill takes less than 2 seconds.      Coloration: Skin is pale.   Neurological:      Mental Status: He is alert and oriented to person, place, and time.      Cranial Nerves: No cranial nerve deficit.      Motor: Weakness present.      Coordination: Coordination normal.   Psychiatric:         Behavior: Behavior normal.         Thought Content: Thought content normal.         Judgment: Judgment normal.      Result Review :     UA          11/9/2022    11:11 3/29/2023    17:06 9/11/2023    11:23   Urinalysis   Squamous Epithelial Cells, UA  0-2     Specific Gravity, UA  1.023     Ketones, UA Negative  Trace  Negative    Blood, UA  Negative     Leukocytes, UA Negative  Negative  Negative    Nitrite, UA  Negative     RBC, UA  0-2     WBC, UA  0-2     Bacteria, UA  None  Seen       Urine Culture          11/4/2022    15:12   Urine Culture   Urine Culture >100,000 CFU/mL Escherichia coli      PSA          9/6/2023    09:42   PSA   PSA <0.014             Assessment and Plan    Problem List Items Addressed This Visit          Genitourinary and Reproductive     Enlarged prostate    Relevant Medications    tamsulosin (FLOMAX) 0.4 MG capsule 24 hr capsule    Bilateral renal cysts    Relevant Medications    hydroCHLOROthiazide (MICROZIDE) 12.5 MG capsule    Frequency of micturition    Relevant Medications    tamsulosin (FLOMAX) 0.4 MG capsule 24 hr capsule    Other Relevant Orders    POC Urinalysis Dipstick, Automated (Completed)    Urine Culture - Urine, Urine, Random Void       Hematology and Neoplasia    Anemia    Prostate cancer - Primary    Relevant Medications    tamsulosin (FLOMAX) 0.4 MG capsule 24 hr capsule     Other Visit Diagnoses       Incomplete emptying of bladder        Relevant Medications    tamsulosin (FLOMAX) 0.4 MG capsule 24 hr capsule    Other Relevant Orders    Bladder Scan (Completed)    BPH without obstruction/lower urinary tract symptoms        Relevant Medications    tamsulosin (FLOMAX) 0.4 MG capsule 24 hr capsule                                                              ASSESSMENT           PROSTATE CANCER/BPH WITH LUTS/BILATERAL RENAL CYSTS  MR. MIREILLE WOODS is a very pleasant 87-year-old male with a significant history of prostate cancer, who returns to clinic today for evaluation.  This is his one year follow-up on BPH with LUTS-nocturia, and Prostate cancer.  The patient is in no apparent distress today and reports doing relatively well over this last year.  His most recent PSA is <0.014 on 08/23/2022, his PSA was also<0.014 the year prior 09/10/2021.  His PSA 3 years prior has been very consistent at < 0.014.  Patient is currently on Flomax daily for BPH with LUTS.  He denies any side effects from medication.  He denies any urinary symptoms of  frequency urgency or dysuria, he denies any burning on urination.  He is unable to give us any urine sample today, reports voiding prior to appointment time.  His IPSS score is 5, his PVR is 0 mL.     BPH: AGAIN, WE Discussed the pathophysiology of BPH and obstruction. We discussed the static and dynamic effects of BPH as well as using 5 alpha reductase inhibitors versus alpha blockade.  We discussed the indications for transurethral surgery as well.  And/ or other therapeutic options available including all of the newer techniques.  He has no real symptomatology referable to his prostate post radiation therapy.  Rather than proceed with an expensive workup he doesn't need, at this time I am recommending he continue with an alpha blocker tamsulosin 0.4 mg capsule daily. WE Discussed medical therapy with Flomax, and I also explained how on Flomax, it relaxes, so that he can he can always urinate better.     PROSTATE CANCER: AGAIN, WE discussed the natural history of prostate cancer and ongoing controversy regarding screening and potential treatment outcomes. The meaning of a false positive PSA and a false negative PSA has been discussed. He indicates understanding of the limitations of this screening test and he is willing to proceed with repeat yearly PSA testing.    Bilateral Renal Cysts/Flank Pain: Very pleasant patient evaluated in clinic today with bilateral renal cyst, seen on recent CT scan 2022. He reports intermittent flank pain, but nothing bothersome to him.  Follows nephrology , kidney functions are unremarkable with recent labs 09/6 of 2023 showing BUN is 22, creatinine 1.43, EGFR is 47.7, BUN was 39 OVER 10 months prior.We discussed the Bosniak classification of renal cysts.  I discussed the strict criteria involved with making a decision regarding intervention.  We discussed the fact that this is likely a simple cyst-Bosniak type I cyst which has sharp distinct borders and a thin wall and no  internal echoes versus a Bosniak 2 with a thicker wall and faint striations. We discussed the risks of malignancy in these situations is essentially 0 and requires no further intervention. However we discussed the fact that a Bosniak type 3 cyst has about a 28% chance of malignancy and finally a Bosniak type IV cyst which most probably is representative of a renal cell carcinoma variant.    We further discussed the fact that most renal cyst are generally asymptomatic and do not require any further intervention and that the appropriate surgical management if it becomes bothersome to the patient is a radiographic cyst puncture, especially when causing significant pain/discomfort or problems particularly on the left with an early satiety.        PLAN  Recheck his labs today folate and B12 as recommended by his PCP    We will recheck his PSA free and total in 1 year    He is to continue his tamsulosin 0.4 mg daily for BPH with LUTS-nocturia    We discussed follow-up in 12 months with a bilateral renal ultrasound if need be.    We discussed comfort measures with hot showers/baths, heating pads, and alternating NSAIDs/Tylenol for pain and discomfort to flank area.    In the meantime I assured the patient of these simple renal cyst and nothing to worry about at this point.    Patient is agreeable plan of care, and follow-up in 12months as discussed.     We will follow-up with patient next year, with PSA results,      Discussed following AN iron rich diet due to his concerns of anemia H&H 8.4/26.0 ON 09/06/23     He has been encouraged to return sooner if need be.     Patient / Agreeable with plan of care.    Patient reports that he is not currently experiencing any symptoms of urinary incontinence.      BMI is within normal parameters. No other follow-up for BMI required.    1. BPH with lower urinary tract symptoms  - The patient will get folate and a B12 done by his PCP.  - He will return in 1 year. He may return sooner  if need be.      RADIOLOGY (CT AND/OR KUB):    CT Abdomen and Pelvis: No results found for this or any previous visit.     CT Stone Protocol: Results for orders placed during the hospital encounter of 11/04/22    CT Abdomen Pelvis Stone Protocol    Narrative  EXAM: CT ABDOMEN PELVIS STONE PROTOCOL-      TECHNIQUE: Multiple axial CT images were obtained from lung bases  through pubic symphysis WITHOUT administration of IV contrast.  Reformatted images in the coronal and/or sagittal plane(s) were  generated from the axial data set to facilitate diagnostic accuracy  and/or surgical planning.  Oral Contrast:NONE.    Radiation dose reduction techniques were utilized per ALARA protocol.  Automated exposure control was initiated through either or [facility] [facility] or  [employer] software packages by  protocol.  DOSE: 552.75 mGy.cm    Clinical information Nephrolithiasis; N20.0-Calculus of kidney    Comparison 11/20/2020    FINDINGS:    Lower thorax: Extensive coronary artery calcifications    Abdomen:    Liver: Homogeneous. No focal hepatic mass or ductal dilatation.    Gallbladder: Surgically absent    Pancreas: Unremarkable. No mass or ductal dilatation.    Spleen: Homogeneous. No splenomegaly.    Adrenals: No mass.    Kidneys/ureters: Bilateral renal cysts are present. Some appear to be  high density cysts. Overall very heavy appearance very similar to the  previous exam. No obstructive uropathy or urolithiasis    GI tract: Non-dilated. No definite wall thickening.. There is no  evidence of appendicitis    MESENTERY: No free fluid, walled off fluid collections, mesenteric  stranding, or enlarged lymph nodes      Vasculature: Evidence of atherosclerotic vascular disease    Abdominal wall: Bilateral inguinal hernias, larger on the right than on  the left.    Bladder: Abnormal thickening of the urinary bladder wall and  irregularity of the wall suggestive of a cystitis    Reproductive: Unremarkable as  visualized    Bones: Arthritic change in the spine. New compression of L1 and  increasing compression of T12. No significant retropulsion    Impression  1. Appearance suggestive of cystitis. No obstructive uropathy or  urolithiasis.    2.Compression fractures of T12 and L1 which is new at L1 and increased  at T12    3. Other findings as above    This report was finalized on 11/4/2022 4:35 PM by Dr. Renny Contreras MD.     KUB: Results for orders placed during the hospital encounter of 07/17/23    XR Abdomen KUB    Narrative  EXAMINATION: XR ABDOMEN KUB-    CLINICAL INDICATION: k59.09; K59.09-Other constipation      COMPARISON: None available    FINDINGS:  One view of the abdomen    No evidence of bowel obstruction    No pneumatosis.    Arthritic change in the spine.    Impression  Unremarkable bowel gas pattern    This report was finalized on 7/17/2023 1:47 PM by Dr. Renny Contreras MD.       LABS (3 MONTHS):    Office Visit on 09/11/2023   Component Date Value Ref Range Status    SARS Antigen 09/11/2023 Detected (A)  Not Detected, Presumptive Negative Final    Influenza A Antigen ADAN 09/11/2023 Not Detected  Not Detected Final    Influenza B Antigen ADAN 09/11/2023 Not Detected  Not Detected Final    Internal Control 09/11/2023 Passed  Passed Final    Lot Number 09/11/2023 [id number]   Final    Expiration Date 09/11/2023 03-   Final   Office Visit on 09/11/2023   Component Date Value Ref Range Status    Color 09/11/2023 Yellow  Yellow, Straw, Dark Yellow, Isela Final    Clarity, UA 09/11/2023 Clear  Clear Final    Specific Gravity  09/11/2023 1.020  1.005 - 1.030 Final    pH, Urine 09/11/2023 5.5  5.0 - 8.0 Final    Leukocytes 09/11/2023 Negative  Negative Final    Nitrite, UA 09/11/2023 Negative  Negative Final    Protein, POC 09/11/2023 3+ (A)  Negative mg/dL Final    Glucose, UA 09/11/2023 Negative  Negative mg/dL Final    Ketones, UA 09/11/2023 Negative  Negative Final    Urobilinogen, UA 09/11/2023 Normal   Normal, 0.2 E.U./dL Final    Bilirubin 09/11/2023 Small (1+) (A)  Negative Final    Blood, UA 09/11/2023 Negative  Negative Final    Lot Number 09/11/2023 [id number]   Final    Expiration Date 09/11/2023 10/25/24   Final   Clinical Support on 09/06/2023   Component Date Value Ref Range Status    Creatine Kinase 09/06/2023 51  20 - 200 U/L Final    Free T4 09/06/2023 1.00  0.93 - 1.70 ng/dL Final    TSH 09/06/2023 3.470  0.270 - 4.200 uIU/mL Final    Total Cholesterol 09/06/2023 141  0 - 200 mg/dL Final    Triglycerides 09/06/2023 175 (H)  0 - 150 mg/dL Final    HDL Cholesterol 09/06/2023 42  40 - 60 mg/dL Final    LDL Cholesterol  09/06/2023 69  0 - 100 mg/dL Final    VLDL Cholesterol 09/06/2023 30  5 - 40 mg/dL Final    LDL/HDL Ratio 09/06/2023 1.52   Final    Glucose 09/06/2023 95  65 - 99 mg/dL Final    BUN 09/06/2023 22  8 - 23 mg/dL Final    Creatinine 09/06/2023 1.43 (H)  0.76 - 1.27 mg/dL Final    Sodium 09/06/2023 144  136 - 145 mmol/L Final    Potassium 09/06/2023 4.0  3.5 - 5.2 mmol/L Final    Chloride 09/06/2023 110 (H)  98 - 107 mmol/L Final    CO2 09/06/2023 24.0  22.0 - 29.0 mmol/L Final    Calcium 09/06/2023 8.7  8.6 - 10.5 mg/dL Final    Total Protein 09/06/2023 5.7 (L)  6.0 - 8.5 g/dL Final    Albumin 09/06/2023 3.7  3.5 - 5.2 g/dL Final    ALT (SGPT) 09/06/2023 17  1 - 41 U/L Final    AST (SGOT) 09/06/2023 25  1 - 40 U/L Final    Alkaline Phosphatase 09/06/2023 35 (L)  39 - 117 U/L Final    Total Bilirubin 09/06/2023 0.2  0.0 - 1.2 mg/dL Final    Globulin 09/06/2023 2.0  gm/dL Final    A/G Ratio 09/06/2023 1.9  g/dL Final    BUN/Creatinine Ratio 09/06/2023 15.4  7.0 - 25.0 Final    Anion Gap 09/06/2023 10.0  5.0 - 15.0 mmol/L Final    eGFR 09/06/2023 47.7 (L)  >60.0 mL/min/1.73 Final    WBC 09/06/2023 8.37  3.40 - 10.80 10*3/mm3 Final    RBC 09/06/2023 2.76 (L)  4.14 - 5.80 10*6/mm3 Final    Hemoglobin 09/06/2023 8.4 (L)  13.0 - 17.7 g/dL Final    Hematocrit 09/06/2023 26.6 (L)  37.5 - 51.0 %  Final    MCV 09/06/2023 96.4  79.0 - 97.0 fL Final    MCH 09/06/2023 30.4  26.6 - 33.0 pg Final    MCHC 09/06/2023 31.6  31.5 - 35.7 g/dL Final    RDW 09/06/2023 13.9  12.3 - 15.4 % Final    RDW-SD 09/06/2023 49.0  37.0 - 54.0 fl Final    MPV 09/06/2023 11.7  6.0 - 12.0 fL Final    Platelets 09/06/2023 156  140 - 450 10*3/mm3 Final    Ferritin 09/06/2023 135.00  30.00 - 400.00 ng/mL Final    Iron 09/06/2023 49 (L)  59 - 158 mcg/dL Final    Iron Saturation (TSAT) 09/06/2023 19 (L)  20 - 50 % Final    Transferrin 09/06/2023 174 (L)  200 - 360 mg/dL Final    TIBC 09/06/2023 259 (L)  298 - 536 mcg/dL Final   Orders Only on 09/05/2023   Component Date Value Ref Range Status    PSA 09/06/2023 <0.014  0.000 - 4.000 ng/mL Final   Clinical Support on 08/07/2023   Component Date Value Ref Range Status    Glucose 08/07/2023 127 (H)  65 - 99 mg/dL Final    BUN 08/07/2023 24 (H)  8 - 23 mg/dL Final    Creatinine 08/07/2023 1.63 (H)  0.76 - 1.27 mg/dL Final    Sodium 08/07/2023 145  136 - 145 mmol/L Final    Potassium 08/07/2023 4.5  3.5 - 5.2 mmol/L Final    Chloride 08/07/2023 108 (H)  98 - 107 mmol/L Final    CO2 08/07/2023 25.5  22.0 - 29.0 mmol/L Final    Calcium 08/07/2023 8.8  8.6 - 10.5 mg/dL Final    BUN/Creatinine Ratio 08/07/2023 14.7  7.0 - 25.0 Final    Anion Gap 08/07/2023 11.5  5.0 - 15.0 mmol/L Final    eGFR 08/07/2023 40.8 (L)  >60.0 mL/min/1.73 Final   Clinical Support on 07/05/2023   Component Date Value Ref Range Status    Glucose 07/05/2023 150 (H)  65 - 99 mg/dL Final    BUN 07/05/2023 23  8 - 23 mg/dL Final    Creatinine 07/05/2023 1.50 (H)  0.76 - 1.27 mg/dL Final    Sodium 07/05/2023 145  136 - 145 mmol/L Final    Potassium 07/05/2023 4.1  3.5 - 5.2 mmol/L Final    Chloride 07/05/2023 111 (H)  98 - 107 mmol/L Final    CO2 07/05/2023 25.2  22.0 - 29.0 mmol/L Final    Calcium 07/05/2023 8.7  8.6 - 10.5 mg/dL Final    Total Protein 07/05/2023 5.6 (L)  6.0 - 8.5 g/dL Final    Albumin 07/05/2023 3.5  3.5 - 5.2  g/dL Final    ALT (SGPT) 07/05/2023 15  1 - 41 U/L Final    AST (SGOT) 07/05/2023 22  1 - 40 U/L Final    Alkaline Phosphatase 07/05/2023 24 (L)  39 - 117 U/L Final    Total Bilirubin 07/05/2023 <0.2  0.0 - 1.2 mg/dL Final    Globulin 07/05/2023 2.1  gm/dL Final    A/G Ratio 07/05/2023 1.7  g/dL Final    BUN/Creatinine Ratio 07/05/2023 15.3  7.0 - 25.0 Final    Anion Gap 07/05/2023 8.8  5.0 - 15.0 mmol/L Final    eGFR 07/05/2023 45.1 (L)  >60.0 mL/min/1.73 Final    Microalbumin/Creatinine Ratio 07/05/2023 486.5  mg/g Final    Creatinine, Urine 07/05/2023 104.0  mg/dL Final    Microalbumin, Urine 07/05/2023 50.6  mg/dL Final    Protein/Creatinine Ratio, Urine 07/05/2023 790.4 (H)  0.0 - 200.0 mg/G Crea Final    Creatinine, Urine 07/05/2023 104.0  mg/dL Final    Total Protein, Urine 07/05/2023 82.2  mg/dL Final        IPSS Questionnaire (AUA-7):  Over the past month…    1)  How often have you had a sensation of not emptying your bladder completely after you finish urinating?  0 - Not at all   2)  How often have you had to urinate again less than two hours after you finished urinating? 2 - Less than half the time   3)  How often have you found you stopped and started again several times when you urinated?  0 - Not at all   4) How difficult have you found it to postpone urination?  0 - Not at all   5) How often have you had a weak urinary stream?  0 - Not at all   6) How often have you had to push or strain to begin urination?  0 - Not at all   7) How many times did you most typically get up to urinate from the time you went to bed until the time you got up in the morning?  3 - 3 times   Total Score:  5     Smoking Cessation Counseling:  Former smoker.QUIT [id number]  Patient does not currently use any tobacco products.     Follow Up   Return in about 1 year (around 9/11/2024) for Next scheduled follow up, With Dr. Hoffman FOR PROSTATE CANCER/RENAL CYSTS BPH W LUTS/PSA/MED REFILL.    Patient was given instructions and  counseling regarding his condition or for health maintenance advice. Please see specific information pulled into the AVS if appropriate.          This document has been electronically signed by Griselda Cheng-Akwa, APRN   September 11, 2023 22:48 EDT      Dictated Utilizing Dragon Dictation: Part of this note may be an electronic transcription/translation of spoken language to printed text using the Dragon Dictation System.       Transcribed from ambient dictation for Griselda Cheng-Akwa, APRN by Liz Cardona.  09/11/23   12:27 EDT    Patient or patient representative verbalized consent to the visit recording.  I have personally performed the services described in this document as transcribed by the above individual, and it is both accurate and complete.

## 2023-09-12 DIAGNOSIS — S32.010A COMPRESSION FRACTURE OF L1 VERTEBRA, INITIAL ENCOUNTER: ICD-10-CM

## 2023-09-12 DIAGNOSIS — U07.1 COVID-19: ICD-10-CM

## 2023-09-12 LAB
FOLATE SERPL-MCNC: >20 NG/ML (ref 4.78–24.2)
VIT B12 BLD-MCNC: 1113 PG/ML (ref 211–946)

## 2023-09-12 RX ORDER — TIZANIDINE 2 MG/1
2 TABLET ORAL 2 TIMES DAILY PRN
Qty: 60 TABLET | Refills: 2 | Status: SHIPPED | OUTPATIENT
Start: 2023-09-12

## 2023-09-12 NOTE — TELEPHONE ENCOUNTER
I contacted [facility] pharmacy and they have lagevrio. I sent it to [facility].    I sent in refill of zanaflex to [facility] as requested.

## 2023-09-12 NOTE — TELEPHONE ENCOUNTER
Aleksandra called and stated the pharmacy didn't have the medication you sent in for covid that Barbara has Paxlovid in both strengths. IF you want that instead please send in. They had also been told no steroids because of his kidneys and he wanted to know if it was safe for him to take.

## 2023-09-12 NOTE — TELEPHONE ENCOUNTER
Called [phone] spoke with Aleksandra notified of medication and she verbalized understanding

## 2023-09-13 LAB — BACTERIA SPEC AEROBE CULT: NO GROWTH

## 2023-09-19 ENCOUNTER — OFFICE VISIT (OUTPATIENT)
Dept: FAMILY MEDICINE CLINIC | Facility: CLINIC | Age: 88
End: 2023-09-19
Payer: MEDICARE

## 2023-09-19 VITALS
DIASTOLIC BLOOD PRESSURE: 52 MMHG | SYSTOLIC BLOOD PRESSURE: 100 MMHG | WEIGHT: 158 LBS | HEIGHT: 70 IN | OXYGEN SATURATION: 96 % | HEART RATE: 61 BPM | BODY MASS INDEX: 22.62 KG/M2 | TEMPERATURE: 97.8 F | RESPIRATION RATE: 18 BRPM

## 2023-09-19 DIAGNOSIS — Z00.00 ENCOUNTER FOR WELLNESS EXAMINATION: Primary | ICD-10-CM

## 2023-09-19 DIAGNOSIS — I10 ESSENTIAL HYPERTENSION: Chronic | ICD-10-CM

## 2023-09-19 DIAGNOSIS — D64.9 ANEMIA, UNSPECIFIED TYPE: ICD-10-CM

## 2023-09-19 DIAGNOSIS — Z00.00 HEALTH CARE MAINTENANCE: ICD-10-CM

## 2023-09-19 NOTE — PROGRESS NOTES
The ABCs of the Annual Wellness Visit  Subsequent Medicare Wellness Visit    Subjective    Rubén Rivas is a 87 y.o. male who presents for a Subsequent Medicare Wellness Visit.    The following portions of the patient's history were reviewed and   updated as appropriate: allergies, current medications, past family history, past medical history, past social history, past surgical history, and problem list.    Compared to one year ago, the patient feels his physical   health is better.    Compared to one year ago, the patient feels his mental   health is the same.    Recent Hospitalizations:  He was not admitted to the hospital during the last year.       Current Medical Providers:  Patient Care Team:  Laura Fulton DO as PCP - General (Family Medicine)  Ze Carey MD as Consulting Physician (Urology)    Outpatient Medications Prior to Visit   Medication Sig Dispense Refill    alendronate (Fosamax) 70 MG tablet Take 1 tablet by mouth Every 7 (Seven) Days. 4 tablet 6    aspirin 81 MG tablet Take  by mouth daily.      Budesonide (ENTOCORT EC) 3 MG 24 hr capsule Take 2 capsules by mouth Daily.      clopidogrel (PLAVIX) 75 MG tablet TAKE 1 TABLET BY MOUTH DAILY. 90 tablet 1    colesevelam (WELCHOL) 625 MG tablet Take 1 tablet by mouth 2 (Two) Times a Day.      diphenhydrAMINE-acetaminophen (TYLENOL PM)  MG tablet per tablet Take 1 tablet by mouth.      diphenoxylate-atropine (Lomotil) 2.5-0.025 MG per tablet Take 1 tablet by mouth 4 (Four) Times a Day As Needed for Diarrhea. 20 tablet 0    docusate sodium 100 MG capsule Take 1 capsule by mouth.      donepezil (Aricept) 10 MG tablet Take 2 tabs po nightly. 180 tablet 3    escitalopram (LEXAPRO) 20 MG tablet Take 1 tablet by mouth once daily 90 tablet 3    esomeprazole (nexIUM) 40 MG capsule TAKE 1 CAPSULE BY MOUTH EVERY MORNING BEFORE BREAKFAST. 90 capsule 3    FIBER PO Take  by mouth Daily.      Glucose Blood (ONETOUCH ULTRA BLUE VI) daily.       hydrALAZINE (APRESOLINE) 10 MG tablet Take 1 tablet by mouth 3 (Three) Times a Day.      hydroCHLOROthiazide (MICROZIDE) 12.5 MG capsule Take 1 capsule by mouth Daily.      losartan (COZAAR) 25 MG tablet       methylPREDNISolone (MEDROL) 4 MG dose pack Take as directed on package instructions. 21 each 0    metoprolol tartrate (LOPRESSOR) 25 MG tablet Take 1 tablet by mouth 2 (Two) Times a Day. 180 tablet 1    Multiple Vitamins-Minerals (ONE-A-DAY MENS 50+ ADVANTAGE PO) Take  by mouth daily.      mupirocin (BACTROBAN) 2 % ointment Apply  topically to the appropriate area as directed 3 (Three) Times a Day. 15 g 1    rosuvastatin (CRESTOR) 20 MG tablet Take 1 tablet by mouth Daily. 90 tablet 3    tamsulosin (FLOMAX) 0.4 MG capsule 24 hr capsule Take 1 capsule by mouth Daily. 90 capsule 3    tiZANidine (ZANAFLEX) 2 MG tablet TAKE 1 TABLET BY MOUTH 2 (TWO) TIMES A DAY AS NEEDED FOR MUSCLE SPASMS. 60 tablet 2    traMADol (ULTRAM) 50 MG tablet Take 1 tablet by mouth Every 6 (Six) Hours As Needed for Moderate Pain.      vitamin B-12 (CYANOCOBALAMIN) 1000 MCG tablet Take 1 tablet by mouth Daily.       No facility-administered medications prior to visit.       Opioid medication/s are on active medication list.  and I have evaluated his active treatment plan and pain score trends (see table).  There were no vitals filed for this visit.  I have reviewed the chart for potential of high risk medication and harmful drug interactions in the elderly.          Aspirin is on active medication list. Aspirin use is indicated based on review of current medical condition/s. Pros and cons of this therapy have been discussed today. Benefits of this medication outweigh potential harm.  Patient has been encouraged to continue taking this medication.  .      Patient Active Problem List   Diagnosis    Anemia    Chronic coronary artery disease    Chronic osteoarthritis    Depression    Enlarged prostate    Hyperlipidemia    Essential  "hypertension    Iron deficiency    Low back pain    Prostate cancer    Mild cognitive impairment with memory loss    Stage 3 chronic kidney disease    Spinal stenosis of lumbar region    DDD (degenerative disc disease), lumbar    Diverticulosis    Gastroesophageal reflux disease without esophagitis    Nontraumatic compression fracture of L1 vertebra    Age-related osteoporosis with current pathological fracture with delayed healing    T12 compression fracture, with delayed healing, subsequent encounter    Paroxysmal atrial fibrillation    Compression fracture of L1 lumbar vertebra    Bilateral renal cysts    Frequency of micturition     Advance Care Planning   Advance Care Planning     Advance Directive is on file.  ACP discussion was held with the patient during this visit. Patient has an advance directive in EMR which is still valid.      Objective    Vitals:    23 1428   BP: 100/52   BP Location: Right arm   Patient Position: Sitting   Cuff Size: Adult   Pulse: 61   Resp: 18   Temp: 97.8 °F (36.6 °C)   TempSrc: Temporal   SpO2: 96%   Weight: 71.7 kg (158 lb)   Height: 177.8 cm (70\")     Estimated body mass index is 22.67 kg/m² as calculated from the following:    Height as of this encounter: 177.8 cm (70\").    Weight as of this encounter: 71.7 kg (158 lb).    BMI is within normal parameters. No other follow-up for BMI required.      Does the patient have evidence of cognitive impairment? No    Lab Results   Component Value Date    TRIG 175 (H) 2023    HDL 42 2023    LDL 69 2023    VLDL 30 2023        HEALTH RISK ASSESSMENT    Smoking Status:  Social History     Tobacco Use   Smoking Status Former    Packs/day: 0.25    Years: 15.00    Pack years: 3.75    Types: Cigarettes    Quit date: 1976    Years since quittin.2   Smokeless Tobacco Never     Alcohol Consumption:  Social History     Substance and Sexual Activity   Alcohol Use No     Fall Risk Screen:    STEADI Fall Risk " Assessment was completed, and patient is at LOW risk for falls.Assessment completed on:2023    Depression Screenin/19/2023     2:31 PM   PHQ-2/PHQ-9 Depression Screening   Little Interest or Pleasure in Doing Things 0-->not at all   Feeling Down, Depressed or Hopeless 0-->not at all   PHQ-9: Brief Depression Severity Measure Score 0       Health Habits and Functional and Cognitive Screenin/19/2023     2:29 PM   Functional & Cognitive Status   Do you have difficulty preparing food and eating? No   Do you have difficulty bathing yourself, getting dressed or grooming yourself? No   Do you have difficulty using the toilet? No   Do you have difficulty moving around from place to place? No   Do you have trouble with steps or getting out of a bed or a chair? No   Current Diet Well Balanced Diet   Dental Exam Up to date   Eye Exam Up to date   Exercise (times per week) 0 times per week   Current Exercises Include Walking   Do you need help using the phone?  No   Are you deaf or do you have serious difficulty hearing?  No   Do you need help to go to places out of walking distance? Yes   Do you need help shopping? No   Do you need help preparing meals?  Yes   Do you need help with housework?  Yes   Do you need help with laundry? Yes   Do you need help taking your medications? Yes   Do you need help managing money? No   Do you ever drive or ride in a car without wearing a seat belt? No   Have you felt unusual stress, anger or loneliness in the last month? No   Who do you live with? Spouse   If you need help, do you have trouble finding someone available to you? No   Have you been bothered in the last four weeks by sexual problems? No   Do you have difficulty concentrating, remembering or making decisions? No       Age-appropriate Screening Schedule:  Refer to the list below for future screening recommendations based on patient's age, sex and/or medical conditions. Orders for these recommended tests are  listed in the plan section. The patient has been provided with a written plan.    Health Maintenance   Topic Date Due    ZOSTER VACCINE (1 of 2) Never done    COVID-19 Vaccine (3 - Booster for Dhara series) 04/15/2022    ANNUAL WELLNESS VISIT  09/12/2023    INFLUENZA VACCINE  10/01/2023    LIPID PANEL  09/06/2024    DXA SCAN  02/02/2025    TDAP/TD VACCINES (3 - Td or Tdap) 09/06/2027    Pneumococcal Vaccine 65+  Completed                  CMS Preventative Services Quick Reference  Risk Factors Identified During Encounter  Immunizations Discussed/Encouraged: Shingrix    Age appropriate screenings were discussed with patient today.   The above risks/problems have been discussed with the patient.  Pertinent information has been shared with the patient in the After Visit Summary.  An After Visit Summary and PPPS were made available to the patient.    Follow Up:   Next Medicare Wellness visit to be scheduled in 1 year.       Additional E&M Note during same encounter follows:  Patient has multiple medical problems which are significant and separately identifiable that require additional work above and beyond the Medicare Wellness Visit.      Chief Complaint  Medicare Wellness-subsequent    Subjective        HPI  Rubén Rivas is also being seen today for hypertension and iron deficiency anemia.  Rubén recently had COVID infection.  On presentation today his blood pressure was slightly low at 100/52 mmHg.  He reports his blood pressures been doing pretty well at home.  He is tolerating his current medicine regimen well.    Rubén has anemia with latest hemoglobin decreasing to 8.4, down from 9.4 previously.  Patient denies seeing any blood in his stool.  His occult cards showed 1 of 3 positive.  He denies abdominal pain.  He is following every 6 months with his gastroenterologist, Dr. Costa, and reports having an upcoming appointment with him soon.     Review of Systems   Constitutional:  Negative for fever.  "  Respiratory:  Negative for shortness of breath.    Cardiovascular:  Negative for chest pain.   Gastrointestinal:         No visualized blood in stool but hgb decreased to 8.4, from 9.4 and 1/3 stool cards were positive.    Musculoskeletal:  Positive for arthralgias and back pain.     Objective   Vital Signs:  /52 (BP Location: Right arm, Patient Position: Sitting, Cuff Size: Adult)   Pulse 61   Temp 97.8 °F (36.6 °C) (Temporal)   Resp 18   Ht 177.8 cm (70\")   Wt 71.7 kg (158 lb)   SpO2 96%   BMI 22.67 kg/m²     Physical Exam  Vitals reviewed.   Constitutional:       General: He is not in acute distress.  HENT:      Head: Normocephalic and atraumatic.   Eyes:      General: No scleral icterus.     Extraocular Movements: Extraocular movements intact.      Conjunctiva/sclera: Conjunctivae normal.      Pupils: Pupils are equal, round, and reactive to light.   Cardiovascular:      Rate and Rhythm: Normal rate and regular rhythm.   Pulmonary:      Effort: Pulmonary effort is normal. No respiratory distress.      Breath sounds: Normal breath sounds. No wheezing or rhonchi.   Abdominal:      Palpations: Abdomen is soft.      Tenderness: There is no abdominal tenderness. There is no guarding or rebound.   Musculoskeletal:         General: No swelling.   Skin:     General: Skin is warm and dry.      Coloration: Skin is not jaundiced.   Neurological:      Mental Status: He is alert.   Psychiatric:         Mood and Affect: Mood normal.         Behavior: Behavior normal.         Thought Content: Thought content normal.         Judgment: Judgment normal.                       Assessment and Plan   Diagnoses and all orders for this visit:    1. Encounter for wellness examination (Primary)    2. Health care maintenance    3. Essential hypertension    4. Anemia, unspecified type  -     CBC (No Diff)      I reviewed labs with patient and his family present.  I recommended following up with GI soon, as planned.  We will " plan to repeat CBC in 2 to 4 weeks.  I encourage patient to eat healthy, including iron rich foods.  I encourage patient to monitor his blood pressure daily and hold losartan if systolic blood pressure was less than 110 mmHg.  Patient is agreeable with plan.  We will continue other medications as previously prescribed at this time.       Follow Up   Return in about 6 weeks (around 10/31/2023), or if symptoms worsen or fail to improve, for Recheck.  Patient was given instructions and counseling regarding his condition or for health maintenance advice. Please see specific information pulled into the AVS if appropriate.         This document has been electronically signed by Laura Fulton DO  September 19, 2023 16:33 EDT

## 2023-10-11 ENCOUNTER — CLINICAL SUPPORT (OUTPATIENT)
Dept: FAMILY MEDICINE CLINIC | Facility: CLINIC | Age: 88
End: 2023-10-11
Payer: MEDICARE

## 2023-10-11 ENCOUNTER — TRANSCRIBE ORDERS (OUTPATIENT)
Dept: FAMILY MEDICINE CLINIC | Facility: CLINIC | Age: 88
End: 2023-10-11
Payer: MEDICARE

## 2023-10-11 DIAGNOSIS — N18.32 STAGE 3B CHRONIC KIDNEY DISEASE: Primary | ICD-10-CM

## 2023-10-11 DIAGNOSIS — N18.32 STAGE 3B CHRONIC KIDNEY DISEASE: ICD-10-CM

## 2023-10-11 PROCEDURE — 80053 COMPREHEN METABOLIC PANEL: CPT | Performed by: INTERNAL MEDICINE

## 2023-10-11 PROCEDURE — 36415 COLL VENOUS BLD VENIPUNCTURE: CPT | Performed by: NURSE PRACTITIONER

## 2023-10-11 NOTE — PROGRESS NOTES
Venipuncture Blood Specimen Collection  Venipuncture performed in Left arm by Lauren Jiménez MA with good hemostasis. Patient tolerated the procedure well without complications.   10/11/23   Lauren Jiménez MA

## 2023-10-12 LAB
ALBUMIN SERPL-MCNC: 3.6 G/DL (ref 3.5–5.2)
ALBUMIN/GLOB SERPL: 1.7 G/DL
ALP SERPL-CCNC: 43 U/L (ref 39–117)
ALT SERPL W P-5'-P-CCNC: 14 U/L (ref 1–41)
ANION GAP SERPL CALCULATED.3IONS-SCNC: 10.2 MMOL/L (ref 5–15)
AST SERPL-CCNC: 16 U/L (ref 1–40)
BILIRUB SERPL-MCNC: 0.2 MG/DL (ref 0–1.2)
BUN SERPL-MCNC: 17 MG/DL (ref 8–23)
BUN/CREAT SERPL: 10.2 (ref 7–25)
CALCIUM SPEC-SCNC: 8.7 MG/DL (ref 8.6–10.5)
CHLORIDE SERPL-SCNC: 107 MMOL/L (ref 98–107)
CO2 SERPL-SCNC: 22.8 MMOL/L (ref 22–29)
CREAT SERPL-MCNC: 1.67 MG/DL (ref 0.76–1.27)
EGFRCR SERPLBLD CKD-EPI 2021: 39.4 ML/MIN/1.73
GLOBULIN UR ELPH-MCNC: 2.1 GM/DL
GLUCOSE SERPL-MCNC: 164 MG/DL (ref 65–99)
POTASSIUM SERPL-SCNC: 4.1 MMOL/L (ref 3.5–5.2)
PROT SERPL-MCNC: 5.7 G/DL (ref 6–8.5)
SODIUM SERPL-SCNC: 140 MMOL/L (ref 136–145)

## 2023-10-12 PROCEDURE — 84156 ASSAY OF PROTEIN URINE: CPT | Performed by: INTERNAL MEDICINE

## 2023-10-12 PROCEDURE — 82043 UR ALBUMIN QUANTITATIVE: CPT | Performed by: INTERNAL MEDICINE

## 2023-10-12 PROCEDURE — 82570 ASSAY OF URINE CREATININE: CPT | Performed by: INTERNAL MEDICINE

## 2023-10-13 LAB
ALBUMIN UR-MCNC: 32.4 MG/DL
CREAT UR-MCNC: 205.9 MG/DL
CREAT UR-MCNC: 205.9 MG/DL
MICROALBUMIN/CREAT UR: 157.4 MG/G (ref 0–29)
PROT ?TM UR-MCNC: 55.2 MG/DL
PROT/CREAT UR: 268.1 MG/G CREA (ref 0–200)

## 2023-10-24 ENCOUNTER — FLU SHOT (OUTPATIENT)
Dept: FAMILY MEDICINE CLINIC | Facility: CLINIC | Age: 88
End: 2023-10-24
Payer: MEDICARE

## 2023-10-24 DIAGNOSIS — I25.10 CHRONIC CORONARY ARTERY DISEASE: Chronic | ICD-10-CM

## 2023-10-24 DIAGNOSIS — M80.00XG AGE-RELATED OSTEOPOROSIS WITH CURRENT PATHOLOGICAL FRACTURE WITH DELAYED HEALING: Primary | Chronic | ICD-10-CM

## 2023-10-24 PROCEDURE — 36415 COLL VENOUS BLD VENIPUNCTURE: CPT | Performed by: INTERNAL MEDICINE

## 2023-10-24 PROCEDURE — 85027 COMPLETE CBC AUTOMATED: CPT | Performed by: INTERNAL MEDICINE

## 2023-10-24 NOTE — PROGRESS NOTES
Immunization  Immunization performed in Left arm by Brittany Gloria MA. Patient tolerated the procedure well without complications.  10/24/23   Brittany Gloria MA

## 2023-10-24 NOTE — PROGRESS NOTES
Venipuncture Blood Specimen Collection  Venipuncture performed in Left arm by Brittany Gloria MA with good hemostasis. Patient tolerated the procedure well without complications.   10/24/23   Brittany Gloria MA

## 2023-10-25 DIAGNOSIS — D50.0 IRON DEFICIENCY ANEMIA DUE TO CHRONIC BLOOD LOSS: Primary | ICD-10-CM

## 2023-10-25 LAB
DEPRECATED RDW RBC AUTO: 46.5 FL (ref 37–54)
ERYTHROCYTE [DISTWIDTH] IN BLOOD BY AUTOMATED COUNT: 13.7 % (ref 12.3–15.4)
HCT VFR BLD AUTO: 24.3 % (ref 37.5–51)
HGB BLD-MCNC: 8 G/DL (ref 13–17.7)
MCH RBC QN AUTO: 30.9 PG (ref 26.6–33)
MCHC RBC AUTO-ENTMCNC: 32.9 G/DL (ref 31.5–35.7)
MCV RBC AUTO: 93.8 FL (ref 79–97)
PLATELET # BLD AUTO: 139 10*3/MM3 (ref 140–450)
PMV BLD AUTO: 11.8 FL (ref 6–12)
RBC # BLD AUTO: 2.59 10*6/MM3 (ref 4.14–5.8)
WBC NRBC COR # BLD: 6.06 10*3/MM3 (ref 3.4–10.8)

## 2023-10-25 RX ORDER — FERROUS GLUCONATE 324(38)MG
324 TABLET ORAL
Qty: 90 TABLET | Refills: 3 | Status: SHIPPED | OUTPATIENT
Start: 2023-10-25

## 2023-11-01 ENCOUNTER — OFFICE VISIT (OUTPATIENT)
Dept: FAMILY MEDICINE CLINIC | Facility: CLINIC | Age: 88
End: 2023-11-01
Payer: MEDICARE

## 2023-11-01 VITALS
HEIGHT: 70 IN | HEART RATE: 61 BPM | BODY MASS INDEX: 23.42 KG/M2 | DIASTOLIC BLOOD PRESSURE: 62 MMHG | TEMPERATURE: 97.5 F | OXYGEN SATURATION: 95 % | SYSTOLIC BLOOD PRESSURE: 148 MMHG | WEIGHT: 163.6 LBS

## 2023-11-01 DIAGNOSIS — N18.32 ANEMIA DUE TO STAGE 3B CHRONIC KIDNEY DISEASE: ICD-10-CM

## 2023-11-01 DIAGNOSIS — I10 ESSENTIAL HYPERTENSION: Primary | Chronic | ICD-10-CM

## 2023-11-01 DIAGNOSIS — D63.1 ANEMIA DUE TO STAGE 3B CHRONIC KIDNEY DISEASE: ICD-10-CM

## 2023-11-01 DIAGNOSIS — N18.32 STAGE 3B CHRONIC KIDNEY DISEASE: Chronic | ICD-10-CM

## 2023-11-01 DIAGNOSIS — D69.6 PLATELETS DECREASED: ICD-10-CM

## 2023-11-01 RX ORDER — LOSARTAN POTASSIUM 50 MG/1
1 TABLET ORAL DAILY
COMMUNITY
Start: 2023-10-11

## 2023-11-01 RX ORDER — HYDRALAZINE HYDROCHLORIDE 25 MG/1
1 TABLET, FILM COATED ORAL EVERY 12 HOURS SCHEDULED
COMMUNITY
Start: 2023-10-13

## 2023-11-01 NOTE — PROGRESS NOTES
Patient Name: Rubén Rivas Today's Date: 2023   Patient MRN / CSN: [id number] / [id number] Date of Encounter: 2023   Patient Age / : 88 y.o. / 1935 Encounter Provider: Laura Fulton DO   Referring Physician: No ref. provider found          Rubén is a 88 y.o. male who is being seen today for Hypertension, Chronic Kidney Disease, and Anemia      History of Present Illness    Rbuén presents today for a follow up on hypertension, chronic kidney disease stage IIIb, and anemia.  He reports feeling great.  Since last visit, his blood pressure has come up.  He was having hypotensive episodes previously.  He reports monitoring his blood pressure 3 times daily and sending in the results to his nephrologist, Dr. Diana.  His blood pressure is doing well with the current medicine regimen.  He has continued to have anemia with his last hemoglobin of 8.0.  Since that lab was done, he has started iron supplement and reports tolerating it well.  His platelet count was also slightly low at 139.  He denies any bleeding, specifically no blood in his urine or stool.  He denies chest pain, shortness of air, or abdominal pain.    Allergies include:Sulfa antibiotics  Current Outpatient Medications   Medication Sig Dispense Refill    alendronate (Fosamax) 70 MG tablet Take 1 tablet by mouth Every 7 (Seven) Days. 4 tablet 6    aspirin 81 MG tablet Take  by mouth daily.      Budesonide (ENTOCORT EC) 3 MG 24 hr capsule Take 2 capsules by mouth Daily.      clopidogrel (PLAVIX) 75 MG tablet TAKE 1 TABLET BY MOUTH DAILY. 90 tablet 1    colesevelam (WELCHOL) 625 MG tablet Take 1 tablet by mouth 2 (Two) Times a Day.      diphenhydrAMINE-acetaminophen (TYLENOL PM)  MG tablet per tablet Take 1 tablet by mouth.      diphenoxylate-atropine (Lomotil) 2.5-0.025 MG per tablet Take 1 tablet by mouth 4 (Four) Times a Day As Needed for Diarrhea. 20 tablet 0    docusate sodium 100 MG capsule Take 1 capsule by mouth.      donepezil  (Aricept) 10 MG tablet Take 2 tabs po nightly. 180 tablet 3    escitalopram (LEXAPRO) 20 MG tablet Take 1 tablet by mouth once daily 90 tablet 3    esomeprazole (nexIUM) 40 MG capsule TAKE 1 CAPSULE BY MOUTH EVERY MORNING BEFORE BREAKFAST. 90 capsule 3    ferrous gluconate (FERGON) 324 MG tablet Take 1 tablet by mouth Daily With Breakfast. 90 tablet 3    FIBER PO Take  by mouth Daily.      Glucose Blood (ONETOUCH ULTRA BLUE VI) daily.      hydrALAZINE (APRESOLINE) 25 MG tablet Take 1 tablet by mouth Every 12 (Twelve) Hours.      hydroCHLOROthiazide (MICROZIDE) 12.5 MG capsule Take 1 capsule by mouth Daily.      losartan (COZAAR) 50 MG tablet Take 1 tablet by mouth Daily.      metoprolol tartrate (LOPRESSOR) 25 MG tablet Take 1 tablet by mouth 2 (Two) Times a Day. 180 tablet 1    Multiple Vitamins-Minerals (ONE-A-DAY MENS 50+ ADVANTAGE PO) Take  by mouth daily.      mupirocin (BACTROBAN) 2 % ointment Apply  topically to the appropriate area as directed 3 (Three) Times a Day. 15 g 1    rosuvastatin (CRESTOR) 20 MG tablet Take 1 tablet by mouth Daily. 90 tablet 3    tamsulosin (FLOMAX) 0.4 MG capsule 24 hr capsule Take 1 capsule by mouth Daily. 90 capsule 3    tiZANidine (ZANAFLEX) 2 MG tablet TAKE 1 TABLET BY MOUTH 2 (TWO) TIMES A DAY AS NEEDED FOR MUSCLE SPASMS. 60 tablet 2    traMADol (ULTRAM) 50 MG tablet Take 1 tablet by mouth Every 6 (Six) Hours As Needed for Moderate Pain.      vitamin B-12 (CYANOCOBALAMIN) 1000 MCG tablet Take 1 tablet by mouth Daily.       No current facility-administered medications for this visit.     Past Medical History:   Diagnosis Date    Anemia     Arthritis     Depression     Hyperlipidemia     Hypertension     Prostate cancer 2013    Stage 3 chronic kidney disease 1/4/2021     Family History   Problem Relation Age of Onset    Cancer Mother 35        breast    Heart attack Father     Cancer Sister         cervical    Heart attack Brother     Heart disease Brother     Cancer Brother      "    Prostate     Past Surgical History:   Procedure Laterality Date    BACK SURGERY      CARDIAC SURGERY      COLONOSCOPY      HERNIA REPAIR      LAPAROSCOPIC CHOLECYSTECTOMY  2020    PERFORMED AT [facility]    STOMACH SURGERY       Social History     Substance and Sexual Activity   Alcohol Use No     Social History     Tobacco Use   Smoking Status Former    Packs/day: 0.25    Years: 15.00    Additional pack years: 0.00    Total pack years: 3.75    Types: Cigarettes    Quit date: 1976    Years since quittin.3   Smokeless Tobacco Never     Social History     Substance and Sexual Activity   Drug Use No     Review of Systems   Respiratory:  Negative for shortness of breath.    Cardiovascular:  Negative for chest pain.   Gastrointestinal:  Negative for abdominal pain and blood in stool.   Genitourinary:  Negative for hematuria.        Depression Assessment Review:  PHQ-9 Total Score:    Vital Signs & Measurements Taken This Encounter  /62 (BP Location: Left arm, Patient Position: Sitting, Cuff Size: Adult)   Pulse 61   Temp 97.5 °F (36.4 °C) (Temporal)   Ht 177.8 cm (70\")   Wt 74.2 kg (163 lb 9.6 oz)   SpO2 95%   BMI 23.47 kg/m²    SpO2 Percentage    23 1059   SpO2: 95%        BMI is within normal parameters. No other follow-up for BMI required.      Physical Exam  Vitals reviewed.   Constitutional:       General: He is not in acute distress.  HENT:      Head: Normocephalic and atraumatic.   Eyes:      General: No scleral icterus.     Extraocular Movements: Extraocular movements intact.      Conjunctiva/sclera: Conjunctivae normal.      Pupils: Pupils are equal, round, and reactive to light.   Cardiovascular:      Rate and Rhythm: Normal rate and regular rhythm.   Pulmonary:      Effort: Pulmonary effort is normal. No respiratory distress.      Breath sounds: Normal breath sounds. No wheezing or rhonchi.   Musculoskeletal:      Cervical back: Neck supple. No tenderness. "   Lymphadenopathy:      Cervical: No cervical adenopathy.   Skin:     General: Skin is warm and dry.      Coloration: Skin is not jaundiced.   Neurological:      Mental Status: He is alert.   Psychiatric:         Mood and Affect: Mood normal.         Behavior: Behavior normal.         Thought Content: Thought content normal.         Judgment: Judgment normal.              Assessment & Plan  Patient Active Problem List   Diagnosis    Anemia    Chronic coronary artery disease    Chronic osteoarthritis    Depression    Enlarged prostate    Hyperlipidemia    Essential hypertension    Iron deficiency    Low back pain    Prostate cancer    Mild cognitive impairment with memory loss    Stage 3 chronic kidney disease    Spinal stenosis of lumbar region    DDD (degenerative disc disease), lumbar    Diverticulosis    Gastroesophageal reflux disease without esophagitis    Nontraumatic compression fracture of L1 vertebra    Age-related osteoporosis with current pathological fracture with delayed healing    T12 compression fracture, with delayed healing, subsequent encounter    Paroxysmal atrial fibrillation    Compression fracture of L1 lumbar vertebra    Bilateral renal cysts    Frequency of micturition    Platelets decreased       ICD-10-CM ICD-9-CM   1. Essential hypertension  I10 401.9   2. Stage 3b chronic kidney disease  N18.32 585.3   3. Anemia due to stage 3b chronic kidney disease  N18.32 285.21    D63.1 585.3   4. Platelets decreased  D69.6 287.5     No orders of the defined types were placed in this encounter.      Meds Ordered During Visit:  No orders of the defined types were placed in this encounter.    I reviewed GI and Nephrology notes.  I encouraged him to follow-up with his specialists as planned.  I encouraged him to continue iron supplementation, as he is now doing.  I recommended repeating a CBC later this month with iron, B12, and folate studies.  We will continue current medicine regimen at this time.  I  encourage patient to go to the emergency department immediately if he were to notice any blood in stool or urine.  He expressed verbal understanding of this.    Return in about 3 months (around 2/1/2024), or if symptoms worsen or fail to improve, for Recheck. Keep lab appt for 11/28/23 as previously ordered.          Referring Provider (if known): No ref. provider found      This document has been electronically signed by Laura Fulton DO  November 1, 2023 11:53 EDT    Laura Fulton DO, FACOI  [address] [place] [address]  [phone] (office)    Part of this note may be an electronic transcription/translation of spoken language to printed text using the Dragon Dictation System.

## 2023-11-28 ENCOUNTER — CLINICAL SUPPORT (OUTPATIENT)
Dept: FAMILY MEDICINE CLINIC | Facility: CLINIC | Age: 88
End: 2023-11-28
Payer: MEDICARE

## 2023-11-28 DIAGNOSIS — D50.0 IRON DEFICIENCY ANEMIA DUE TO CHRONIC BLOOD LOSS: ICD-10-CM

## 2023-11-28 LAB
DEPRECATED RDW RBC AUTO: 46.2 FL (ref 37–54)
ERYTHROCYTE [DISTWIDTH] IN BLOOD BY AUTOMATED COUNT: 13.7 % (ref 12.3–15.4)
FERRITIN SERPL-MCNC: 144 NG/ML (ref 30–400)
FOLATE SERPL-MCNC: >20 NG/ML (ref 4.78–24.2)
HCT VFR BLD AUTO: 25.4 % (ref 37.5–51)
HGB BLD-MCNC: 8.4 G/DL (ref 13–17.7)
IRON 24H UR-MRATE: 20 MCG/DL (ref 59–158)
IRON SATN MFR SERPL: 8 % (ref 20–50)
MCH RBC QN AUTO: 30.5 PG (ref 26.6–33)
MCHC RBC AUTO-ENTMCNC: 33.1 G/DL (ref 31.5–35.7)
MCV RBC AUTO: 92.4 FL (ref 79–97)
PLATELET # BLD AUTO: 116 10*3/MM3 (ref 140–450)
PMV BLD AUTO: 11.9 FL (ref 6–12)
RBC # BLD AUTO: 2.75 10*6/MM3 (ref 4.14–5.8)
TIBC SERPL-MCNC: 244 MCG/DL (ref 298–536)
TRANSFERRIN SERPL-MCNC: 164 MG/DL (ref 200–360)
VIT B12 BLD-MCNC: 1018 PG/ML (ref 211–946)
WBC NRBC COR # BLD AUTO: 6.46 10*3/MM3 (ref 3.4–10.8)

## 2023-11-28 PROCEDURE — 82728 ASSAY OF FERRITIN: CPT | Performed by: INTERNAL MEDICINE

## 2023-11-28 PROCEDURE — 36415 COLL VENOUS BLD VENIPUNCTURE: CPT | Performed by: INTERNAL MEDICINE

## 2023-11-28 PROCEDURE — 82746 ASSAY OF FOLIC ACID SERUM: CPT | Performed by: INTERNAL MEDICINE

## 2023-11-28 PROCEDURE — 83540 ASSAY OF IRON: CPT | Performed by: INTERNAL MEDICINE

## 2023-11-28 PROCEDURE — 84466 ASSAY OF TRANSFERRIN: CPT | Performed by: INTERNAL MEDICINE

## 2023-11-28 PROCEDURE — 82607 VITAMIN B-12: CPT | Performed by: INTERNAL MEDICINE

## 2023-11-28 PROCEDURE — 85027 COMPLETE CBC AUTOMATED: CPT | Performed by: INTERNAL MEDICINE

## 2023-11-28 NOTE — PROGRESS NOTES
Venipuncture Blood Specimen Collection  Venipuncture performed in Right arm by Lauren Jiménez MA with good hemostasis. Patient tolerated the procedure well without complications.   11/28/23   Lauren Jiménez MA

## 2023-11-29 DIAGNOSIS — N18.32 ANEMIA DUE TO STAGE 3B CHRONIC KIDNEY DISEASE: ICD-10-CM

## 2023-11-29 DIAGNOSIS — D69.6 THROMBOCYTOPENIA: ICD-10-CM

## 2023-11-29 DIAGNOSIS — D63.1 ANEMIA DUE TO STAGE 3B CHRONIC KIDNEY DISEASE: ICD-10-CM

## 2023-11-29 DIAGNOSIS — E61.1 IRON DEFICIENCY: Primary | ICD-10-CM

## 2023-12-14 DIAGNOSIS — S32.010A COMPRESSION FRACTURE OF L1 VERTEBRA, INITIAL ENCOUNTER: ICD-10-CM

## 2023-12-14 RX ORDER — CLOPIDOGREL BISULFATE 75 MG/1
75 TABLET ORAL DAILY
Qty: 90 TABLET | Refills: 1 | Status: SHIPPED | OUTPATIENT
Start: 2023-12-14

## 2023-12-14 RX ORDER — TIZANIDINE 2 MG/1
2 TABLET ORAL 2 TIMES DAILY PRN
Qty: 60 TABLET | Refills: 5 | Status: SHIPPED | OUTPATIENT
Start: 2023-12-14

## 2024-01-02 ENCOUNTER — OFFICE VISIT (OUTPATIENT)
Dept: FAMILY MEDICINE CLINIC | Facility: CLINIC | Age: 89
End: 2024-01-02
Payer: MEDICARE

## 2024-01-02 VITALS
TEMPERATURE: 97.8 F | HEART RATE: 58 BPM | SYSTOLIC BLOOD PRESSURE: 128 MMHG | BODY MASS INDEX: 23.34 KG/M2 | OXYGEN SATURATION: 98 % | HEIGHT: 70 IN | WEIGHT: 163 LBS | DIASTOLIC BLOOD PRESSURE: 60 MMHG | RESPIRATION RATE: 18 BRPM

## 2024-01-02 DIAGNOSIS — M51.36 DDD (DEGENERATIVE DISC DISEASE), LUMBAR: Primary | Chronic | ICD-10-CM

## 2024-01-02 PROCEDURE — 99214 OFFICE O/P EST MOD 30 MIN: CPT | Performed by: NURSE PRACTITIONER

## 2024-01-02 PROCEDURE — 1159F MED LIST DOCD IN RCRD: CPT | Performed by: NURSE PRACTITIONER

## 2024-01-02 PROCEDURE — 1160F RVW MEDS BY RX/DR IN RCRD: CPT | Performed by: NURSE PRACTITIONER

## 2024-01-02 RX ORDER — TRAMADOL HYDROCHLORIDE 50 MG/1
50 TABLET ORAL EVERY 6 HOURS PRN
Qty: 12 TABLET | Refills: 0 | Status: SHIPPED | OUTPATIENT
Start: 2024-01-02

## 2024-01-02 NOTE — PROGRESS NOTES
"Subjective   Rubén Rivas is a 88 y.o. male.     Chief Complaint   Patient presents with    Back Pain       History of Present Illness  He presents with c/o pain in his low back that flared up on Sunday. He had surgery on his back on April 1st of last year. He called his surgeon but can't get in for 2-3 months. Pain is eased with straightening his back up. Pain is sharp. Sharp pain comes and goes but he aches all the time. He denies loss of bowel and bladder function but the sharp pain does make him pee a little. He has tried heat. He has tried a massage vibrator that helps some too. He is taking tizanidine that is not helping. He denies injury.       The following portions of the patient's history were reviewed and updated as appropriate: allergies, current medications, past family history, past medical history, past social history, past surgical history and problem list.    Review of Systems   Constitutional:  Negative for fever and unexpected weight change.   Respiratory:  Negative for cough, shortness of breath and wheezing.    Cardiovascular:  Negative for chest pain and palpitations.   Gastrointestinal:  Negative for abdominal pain, blood in stool, constipation, diarrhea, nausea and vomiting.   Genitourinary:  Negative for dysuria and hematuria.   Musculoskeletal:  Positive for arthralgias, back pain and myalgias.       Objective     /60 (BP Location: Right arm, Patient Position: Sitting, Cuff Size: Large Adult)   Pulse 58   Temp 97.8 °F (36.6 °C) (Temporal)   Resp 18   Ht 177.8 cm (70\")   Wt 73.9 kg (163 lb)   SpO2 98%   BMI 23.39 kg/m²     Physical Exam  Vitals reviewed.   Constitutional:       General: He is not in acute distress.     Appearance: He is well-developed. He is not diaphoretic.   HENT:      Head: Normocephalic and atraumatic.   Cardiovascular:      Rate and Rhythm: Regular rhythm. Bradycardia present.      Heart sounds: Normal heart sounds. No murmur heard.     No friction rub. No " gallop.   Pulmonary:      Effort: Pulmonary effort is normal. No respiratory distress.      Breath sounds: Normal breath sounds.   Abdominal:      General: Bowel sounds are normal. There is no distension.      Palpations: Abdomen is soft.      Tenderness: There is no abdominal tenderness.   Musculoskeletal:      Lumbar back: Tenderness present.   Skin:     General: Skin is warm and dry.   Neurological:      Mental Status: He is alert and oriented to person, place, and time.      Gait: Gait abnormal.   Psychiatric:         Behavior: Behavior normal.         Thought Content: Thought content normal.         Judgment: Judgment normal.         Current Outpatient Medications   Medication Sig Dispense Refill    alendronate (Fosamax) 70 MG tablet Take 1 tablet by mouth Every 7 (Seven) Days. 4 tablet 6    aspirin 81 MG tablet Take  by mouth daily.      Budesonide (ENTOCORT EC) 3 MG 24 hr capsule Take 2 capsules by mouth Daily.      clopidogrel (PLAVIX) 75 MG tablet TAKE 1 TABLET BY MOUTH DAILY. 90 tablet 1    colesevelam (WELCHOL) 625 MG tablet Take 1 tablet by mouth 2 (Two) Times a Day.      diphenhydrAMINE-acetaminophen (TYLENOL PM)  MG tablet per tablet Take 1 tablet by mouth.      diphenoxylate-atropine (Lomotil) 2.5-0.025 MG per tablet Take 1 tablet by mouth 4 (Four) Times a Day As Needed for Diarrhea. 20 tablet 0    docusate sodium 100 MG capsule Take 1 capsule by mouth.      donepezil (Aricept) 10 MG tablet Take 2 tabs po nightly. 180 tablet 3    escitalopram (LEXAPRO) 20 MG tablet Take 1 tablet by mouth once daily 90 tablet 3    esomeprazole (nexIUM) 40 MG capsule TAKE 1 CAPSULE BY MOUTH EVERY MORNING BEFORE BREAKFAST. 90 capsule 3    ferrous gluconate (FERGON) 324 MG tablet Take 1 tablet by mouth Daily With Breakfast. 90 tablet 3    FIBER PO Take  by mouth Daily.      Glucose Blood (ONETOUCH ULTRA BLUE VI) daily.      hydrALAZINE (APRESOLINE) 25 MG tablet Take 1 tablet by mouth Every 12 (Twelve) Hours.       hydroCHLOROthiazide (MICROZIDE) 12.5 MG capsule Take 1 capsule by mouth Daily.      losartan (COZAAR) 50 MG tablet Take 1 tablet by mouth Daily.      metoprolol tartrate (LOPRESSOR) 25 MG tablet Take 1 tablet by mouth 2 (Two) Times a Day. 180 tablet 1    Multiple Vitamins-Minerals (ONE-A-DAY MENS 50+ ADVANTAGE PO) Take  by mouth daily.      mupirocin (BACTROBAN) 2 % ointment Apply  topically to the appropriate area as directed 3 (Three) Times a Day. 15 g 1    rosuvastatin (CRESTOR) 20 MG tablet Take 1 tablet by mouth Daily. 90 tablet 3    tamsulosin (FLOMAX) 0.4 MG capsule 24 hr capsule Take 1 capsule by mouth Daily. 90 capsule 3    tiZANidine (ZANAFLEX) 2 MG tablet TAKE 1 TABLET BY MOUTH 2 (TWO) TIMES A DAY AS NEEDED FOR MUSCLE SPASMS. 60 tablet 5    traMADol (ULTRAM) 50 MG tablet Take 1 tablet by mouth Every 6 (Six) Hours As Needed for Moderate Pain. 12 tablet 0    vitamin B-12 (CYANOCOBALAMIN) 1000 MCG tablet Take 1 tablet by mouth Daily.       No current facility-administered medications for this visit.            Assessment & Plan     Problem List Items Addressed This Visit       DDD (degenerative disc disease), lumbar - Primary (Chronic)    Relevant Medications    traMADol (ULTRAM) 50 MG tablet         ICD-10-CM ICD-9-CM   1. DDD (degenerative disc disease), lumbar  M51.36 722.52       Plan: He is already on a muscle relaxant. Kidney function is not good, I do not want to add an NSAID. Patel reviewed and appropriate. Tramadol ordered four times daily as needed x3 days. He declines physical therapy. Try to get him in with Dr. Landon sooner. Follow up with Dr. Fulton next week.    @Body mass index is 23.39 kg/m².         Understands disease processes and need for medications.  Understands reasons for urgent and emergent care.  Patient (& family) verbalized agreement for treatment plan.   Emotional support and active listening provided.  Patient provided time to verbalize feelings.           BMI is within  normal parameters. No other follow-up for BMI required.      This document has been electronically signed by ARNEL Amos   January 2, 2024 13:56 EST

## 2024-01-09 ENCOUNTER — TELEPHONE (OUTPATIENT)
Dept: FAMILY MEDICINE CLINIC | Facility: CLINIC | Age: 89
End: 2024-01-09
Payer: MEDICARE

## 2024-01-09 NOTE — TELEPHONE ENCOUNTER
RECEIVED MESSAGE REGARDING GETTING PATIENT SEEN WITH DR LAUREANO. I CALLED AND LEFT MESSAGE REQUESTING CALL BACK. [facility] WITH THE OFFICE CALLED AND STATES THAT PATIENT HAD AN APPOINTMENT AND WAS BEING SCHEDULED FOR TESTING BUT CANCELED APPOINTMENT. IN ORDER FOR PATIENT TO BE SEEN WITH OFFICE PATIENT WILL NEED TO HAVE AN UPDATED MRI DONE PRIOR TO SCHEDULING. PATIENT IS SCHEDULED TO BE SEEN WITH DR ARTEAGA ON THURSDAY 1/11/24.

## 2024-01-11 ENCOUNTER — OFFICE VISIT (OUTPATIENT)
Dept: FAMILY MEDICINE CLINIC | Facility: CLINIC | Age: 89
End: 2024-01-11
Payer: MEDICARE

## 2024-01-11 ENCOUNTER — TRANSCRIBE ORDERS (OUTPATIENT)
Dept: FAMILY MEDICINE CLINIC | Facility: CLINIC | Age: 89
End: 2024-01-11
Payer: MEDICARE

## 2024-01-11 VITALS
OXYGEN SATURATION: 98 % | SYSTOLIC BLOOD PRESSURE: 120 MMHG | BODY MASS INDEX: 22.99 KG/M2 | WEIGHT: 160.2 LBS | TEMPERATURE: 97.8 F | HEART RATE: 54 BPM | DIASTOLIC BLOOD PRESSURE: 50 MMHG

## 2024-01-11 DIAGNOSIS — M48.062 SPINAL STENOSIS OF LUMBAR REGION WITH NEUROGENIC CLAUDICATION: Primary | Chronic | ICD-10-CM

## 2024-01-11 DIAGNOSIS — Z51.81 MEDICATION MONITORING ENCOUNTER: ICD-10-CM

## 2024-01-11 DIAGNOSIS — N18.32 CKD STAGE G3B/A2, GFR 30-44 AND ALBUMIN CREATININE RATIO 30-299 MG/G: Primary | ICD-10-CM

## 2024-01-11 LAB
AMPHET+METHAMPHET UR QL: NEGATIVE
AMPHETAMINES UR QL: NEGATIVE
BARBITURATES UR QL SCN: NEGATIVE
BENZODIAZ UR QL SCN: NEGATIVE
BILIRUB BLD-MCNC: NEGATIVE MG/DL
BUPRENORPHINE SERPL-MCNC: NEGATIVE NG/ML
CANNABINOIDS SERPL QL: NEGATIVE
CLARITY, POC: CLEAR
COCAINE UR QL: NEGATIVE
COLOR UR: YELLOW
FENTANYL UR-MCNC: NEGATIVE NG/ML
GLUCOSE UR STRIP-MCNC: NEGATIVE MG/DL
KETONES UR QL: NEGATIVE
LEUKOCYTE EST, POC: NEGATIVE
METHADONE UR QL SCN: NEGATIVE
NITRITE UR-MCNC: NEGATIVE MG/ML
OPIATES UR QL: POSITIVE
OXYCODONE UR QL SCN: NEGATIVE
PCP UR QL SCN: NEGATIVE
PH UR: 6 [PH] (ref 5–8)
PROT UR STRIP-MCNC: ABNORMAL MG/DL
RBC # UR STRIP: NEGATIVE /UL
SP GR UR: 1.01 (ref 1–1.03)
TRICYCLICS UR QL SCN: NEGATIVE
UROBILINOGEN UR QL: NORMAL

## 2024-01-11 PROCEDURE — 80048 BASIC METABOLIC PNL TOTAL CA: CPT | Performed by: INTERNAL MEDICINE

## 2024-01-11 PROCEDURE — 82570 ASSAY OF URINE CREATININE: CPT | Performed by: INTERNAL MEDICINE

## 2024-01-11 PROCEDURE — 82043 UR ALBUMIN QUANTITATIVE: CPT | Performed by: INTERNAL MEDICINE

## 2024-01-11 PROCEDURE — 80307 DRUG TEST PRSMV CHEM ANLYZR: CPT | Performed by: INTERNAL MEDICINE

## 2024-01-11 RX ORDER — HYDROCODONE BITARTRATE AND ACETAMINOPHEN 5; 325 MG/1; MG/1
1 TABLET ORAL EVERY 12 HOURS PRN
Qty: 60 TABLET | Refills: 0 | Status: SHIPPED | OUTPATIENT
Start: 2024-01-11

## 2024-01-11 NOTE — PROGRESS NOTES
Patient Name: Rubén Rivas Today's Date: 2024   Patient MRN / CSN: [id number] / [id number] Date of Encounter: 2024   Patient Age / : 88 y.o. / 1935 Encounter Provider: Laura Fulton DO   Referring Physician: No ref. provider found          Rubén is a 88 y.o. male who is being seen today for Follow-up, Back Pain, and MRI order      Back Pain  This is a chronic problem. The current episode started more than 1 year ago (Patient has a history of lumbar spinal stenosis and compression fracture of L1, status post kyphoplasty ). The problem has been worsening (Worsening X 2-3 weeks without any known injury) since onset. The pain is present in the lumbar spine. The pain is at a severity of 10/10. The pain is severe. The symptoms are aggravated by position. Associated symptoms include leg pain, numbness and tingling. Pertinent negatives include no bladder incontinence, bowel incontinence, dysuria or fever. He has tried bed rest, muscle relaxant and analgesics (Tramadol and zanaflex have not helped. 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   Additional comments: Patient reached out to his neurosurgeon who requested MRI.      Allergies include:Sulfa antibiotics  Current Outpatient Medications   Medication Sig Dispense Refill    alendronate (Fosamax) 70 MG tablet Take 1 tablet by mouth Every 7 (Seven) Days. 4 tablet 6    aspirin 81 MG tablet Take  by mouth daily.      Budesonide (ENTOCORT EC) 3 MG 24 hr capsule Take 2 capsules by mouth Daily.      clopidogrel (PLAVIX) 75 MG tablet TAKE 1 TABLET BY MOUTH DAILY. 90 tablet 1    colesevelam (WELCHOL) 625 MG tablet Take 1 tablet by mouth 2 (Two) Times a Day.      diphenhydrAMINE-acetaminophen (TYLENOL PM)  MG tablet per tablet Take 1 tablet by mouth.      diphenoxylate-atropine (Lomotil) 2.5-0.025 MG per tablet Take 1 tablet by mouth 4 (Four) Times a Day As Needed for Diarrhea. 20 tablet 0    docusate sodium 100 MG capsule Take 1  capsule by mouth.      donepezil (Aricept) 10 MG tablet Take 2 tabs po nightly. 180 tablet 3    escitalopram (LEXAPRO) 20 MG tablet Take 1 tablet by mouth once daily 90 tablet 3    esomeprazole (nexIUM) 40 MG capsule TAKE 1 CAPSULE BY MOUTH EVERY MORNING BEFORE BREAKFAST. 90 capsule 3    ferrous gluconate (FERGON) 324 MG tablet Take 1 tablet by mouth Daily With Breakfast. 90 tablet 3    FIBER PO Take  by mouth Daily.      Glucose Blood (ONETOUCH ULTRA BLUE VI) daily.      hydrALAZINE (APRESOLINE) 25 MG tablet Take 1 tablet by mouth Every 12 (Twelve) Hours.      hydroCHLOROthiazide (MICROZIDE) 12.5 MG capsule Take 1 capsule by mouth Daily.      losartan (COZAAR) 50 MG tablet Take 1 tablet by mouth Daily.      metoprolol tartrate (LOPRESSOR) 25 MG tablet Take 1 tablet by mouth 2 (Two) Times a Day. 180 tablet 1    Multiple Vitamins-Minerals (ONE-A-DAY MENS 50+ ADVANTAGE PO) Take  by mouth daily.      mupirocin (BACTROBAN) 2 % ointment Apply  topically to the appropriate area as directed 3 (Three) Times a Day. 15 g 1    rosuvastatin (CRESTOR) 20 MG tablet Take 1 tablet by mouth Daily. 90 tablet 3    tamsulosin (FLOMAX) 0.4 MG capsule 24 hr capsule Take 1 capsule by mouth Daily. 90 capsule 3    tiZANidine (ZANAFLEX) 2 MG tablet TAKE 1 TABLET BY MOUTH 2 (TWO) TIMES A DAY AS NEEDED FOR MUSCLE SPASMS. 60 tablet 5    vitamin B-12 (CYANOCOBALAMIN) 1000 MCG tablet Take 1 tablet by mouth Daily.      HYDROcodone-acetaminophen (NORCO) 5-325 MG per tablet Take 1 tablet by mouth Every 12 (Twelve) Hours As Needed for Severe Pain. 60 tablet 0     No current facility-administered medications for this visit.     Past Medical History:   Diagnosis Date    Anemia     Arthritis     Bilateral renal cysts 09/11/2023    Depression     Enlarged prostate 07/18/2016    Hyperlipidemia     Hypertension     Prostate cancer 2013    Stage 3 chronic kidney disease 01/04/2021     Family History   Problem Relation Age of Onset    Cancer Mother 35         breast    Heart attack Father     Cancer Sister         cervical    Heart attack Brother     Heart disease Brother     Cancer Brother         Prostate     Past Surgical History:   Procedure Laterality Date    BACK SURGERY      CARDIAC SURGERY      COLONOSCOPY      HERNIA REPAIR      LAPAROSCOPIC CHOLECYSTECTOMY  2020    PERFORMED AT [facility]    STOMACH SURGERY       Social History     Substance and Sexual Activity   Alcohol Use No     Social History     Tobacco Use   Smoking Status Former    Packs/day: 0.25    Years: 15.00    Additional pack years: 0.00    Total pack years: 3.75    Types: Cigarettes    Quit date: 1976    Years since quittin.5   Smokeless Tobacco Never     Social History     Substance and Sexual Activity   Drug Use No     Review of Systems   Constitutional:  Negative for fever.   Gastrointestinal:  Negative for bowel incontinence.   Genitourinary:  Negative for bladder incontinence, dysuria and hematuria.   Musculoskeletal:  Positive for back pain.   Neurological:  Positive for tingling and numbness.        Depression Assessment Review:  PHQ-9 Total Score:    Vital Signs & Measurements Taken This Encounter  /50 (BP Location: Left arm, Patient Position: Sitting, Cuff Size: Adult)   Pulse 54   Temp 97.8 °F (36.6 °C) (Temporal)   Wt 72.7 kg (160 lb 3.2 oz)   SpO2 98%   BMI 22.99 kg/m²    SpO2 Percentage    24 1505   SpO2: 98%        BMI is within normal parameters. No other follow-up for BMI required.      Physical Exam  Vitals reviewed.   Constitutional:       General: He is not in acute distress.  HENT:      Head: Normocephalic and atraumatic.   Eyes:      General: No scleral icterus.     Extraocular Movements: Extraocular movements intact.      Conjunctiva/sclera: Conjunctivae normal.      Pupils: Pupils are equal, round, and reactive to light.   Cardiovascular:      Rate and Rhythm: Regular rhythm. Bradycardia present.   Pulmonary:      Effort: Pulmonary effort  is normal. No respiratory distress.      Breath sounds: Normal breath sounds. No wheezing or rhonchi.   Musculoskeletal:         General: No swelling.      Comments: Patient is groaning in pain.  He is wearing lumbar back brace without relief.   Skin:     General: Skin is warm and dry.      Coloration: Skin is not jaundiced.   Neurological:      Mental Status: He is alert.   Psychiatric:         Mood and Affect: Mood normal.         Behavior: Behavior normal.         Thought Content: Thought content normal.         Judgment: Judgment normal.              Assessment & Plan  Patient Active Problem List   Diagnosis    Anemia    Chronic coronary artery disease    Chronic osteoarthritis    Depression    Enlarged prostate    Hyperlipidemia    Essential hypertension    Iron deficiency    Low back pain    Prostate cancer    Mild cognitive impairment with memory loss    Stage 3 chronic kidney disease    Spinal stenosis of lumbar region    DDD (degenerative disc disease), lumbar    Diverticulosis    Gastroesophageal reflux disease without esophagitis    Nontraumatic compression fracture of L1 vertebra    Age-related osteoporosis with current pathological fracture with delayed healing    T12 compression fracture, with delayed healing, subsequent encounter    Paroxysmal atrial fibrillation    Compression fracture of L1 lumbar vertebra    Bilateral renal cysts    Frequency of micturition    Platelets decreased       ICD-10-CM ICD-9-CM   1. Spinal stenosis of lumbar region with neurogenic claudication  M48.062 724.03   2. Medication monitoring encounter  Z51.81 V58.83     Orders Placed This Encounter   Procedures    MRI Lumbar Spine Without Contrast     Standing Status:   Future     Standing Expiration Date:   1/11/2025     Order Specific Question:   Release to patient     Answer:   Routine Release [[id number]]    Urine Drug Screen - Urine, Clean Catch     Order Specific Question:   Release to patient     Answer:   Routine  Release [[id number]]    POC Urinalysis Dipstick     Order Specific Question:   Release to patient     Answer:   Routine Release [[id number]]       Meds Ordered During Visit:  New Medications Ordered This Visit   Medications    HYDROcodone-acetaminophen (NORCO) 5-325 MG per tablet     Sig: Take 1 tablet by mouth Every 12 (Twelve) Hours As Needed for Severe Pain.     Dispense:  60 tablet     Refill:  0     We will update MRI of the lumbar spine ASAP.  I reviewed PDMP today.  Patient reports tramadol did not help at all.  He has done well with hydrocodone in the past but needs a refill of it.  We will update controlled substance contract and UDS today.  We will also check urine analysis.  Norco prescription sent in today as above to take only as needed.  Patient may continue his muscle relaxer as needed for spasms.  He should continue to avoid NSAIDs due to his decreased renal function.  UA in office today was negative for leukocytes, nitrites, and blood.    Return in about 1 month (around 2/11/2024), or if symptoms worsen or fail to improve, for Next scheduled follow up, Recheck.          Referring Provider (if known): No ref. provider found      This document has been electronically signed by Laura Fulton DO  January 11, 2024 17:07 EST    Laura Fulton DO, FACOI  [address] [place] [address] [phone] (office)    Part of this note may be an electronic transcription/translation of spoken language to printed text using the Dragon Dictation System.

## 2024-01-12 LAB
ALBUMIN UR-MCNC: 29.8 MG/DL
ANION GAP SERPL CALCULATED.3IONS-SCNC: 12.2 MMOL/L (ref 5–15)
BUN SERPL-MCNC: 25 MG/DL (ref 8–23)
BUN/CREAT SERPL: 15.2 (ref 7–25)
CALCIUM SPEC-SCNC: 8.4 MG/DL (ref 8.6–10.5)
CHLORIDE SERPL-SCNC: 107 MMOL/L (ref 98–107)
CO2 SERPL-SCNC: 21.8 MMOL/L (ref 22–29)
CREAT SERPL-MCNC: 1.65 MG/DL (ref 0.76–1.27)
CREAT UR-MCNC: 112.5 MG/DL
EGFRCR SERPLBLD CKD-EPI 2021: 39.9 ML/MIN/1.73
GLUCOSE SERPL-MCNC: 96 MG/DL (ref 65–99)
MICROALBUMIN/CREAT UR: 264.9 MG/G (ref 0–29)
POTASSIUM SERPL-SCNC: 4.1 MMOL/L (ref 3.5–5.2)
SODIUM SERPL-SCNC: 141 MMOL/L (ref 136–145)

## 2024-01-19 ENCOUNTER — TELEPHONE (OUTPATIENT)
Dept: FAMILY MEDICINE CLINIC | Facility: CLINIC | Age: 89
End: 2024-01-19
Payer: MEDICARE

## 2024-01-19 NOTE — TELEPHONE ENCOUNTER
PATIENT HAS AN UPCOMING PROCEDURE FOR AN MRI AND IS REQUESTING SOMETHING FOR HIS ANXIETY BE SENT TO [facility]

## 2024-01-23 NOTE — TELEPHONE ENCOUNTER
Patient is on norco and I don't typically prescribe benzo with it. I see where he was prescribed valium last year by another provider for MRI. Please explain to patient that I don't typically do that. Would he be able to do MRI without it?

## 2024-01-23 NOTE — TELEPHONE ENCOUNTER
Called pt at [phone], unable to reach. Left message to return call and I wojciech try again tomorrow.

## 2024-01-24 ENCOUNTER — TELEPHONE (OUTPATIENT)
Dept: FAMILY MEDICINE CLINIC | Facility: CLINIC | Age: 89
End: 2024-01-24
Payer: MEDICARE

## 2024-01-24 NOTE — TELEPHONE ENCOUNTER
NATALIA CALLED BACK TO LET YAZAN KNOW MIREILLE IS GOING TO BE OKAY WITHOUT THE MEDICINE. SHE SAYS YAZAN WILL KNOW WHAT THAT MEANS.

## 2024-01-25 ENCOUNTER — HOSPITAL ENCOUNTER (OUTPATIENT)
Dept: MRI IMAGING | Facility: HOSPITAL | Age: 89
Discharge: HOME | End: 2024-01-25
Admitting: INTERNAL MEDICINE
Payer: MEDICARE

## 2024-01-25 DIAGNOSIS — M48.062 SPINAL STENOSIS OF LUMBAR REGION WITH NEUROGENIC CLAUDICATION: Chronic | ICD-10-CM

## 2024-01-25 PROCEDURE — 72148 MRI LUMBAR SPINE W/O DYE: CPT | Performed by: RADIOLOGY

## 2024-01-25 PROCEDURE — 72148 MRI LUMBAR SPINE W/O DYE: CPT

## 2024-01-26 DIAGNOSIS — E78.2 MIXED HYPERLIPIDEMIA: ICD-10-CM

## 2024-01-29 RX ORDER — ROSUVASTATIN CALCIUM 20 MG/1
20 TABLET, COATED ORAL DAILY
Qty: 90 TABLET | Refills: 0 | Status: SHIPPED | OUTPATIENT
Start: 2024-01-29

## 2024-01-30 ENCOUNTER — OFFICE VISIT (OUTPATIENT)
Dept: FAMILY MEDICINE CLINIC | Facility: CLINIC | Age: 89
End: 2024-01-30
Payer: MEDICARE

## 2024-01-30 ENCOUNTER — TELEPHONE (OUTPATIENT)
Dept: FAMILY MEDICINE CLINIC | Facility: CLINIC | Age: 89
End: 2024-01-30

## 2024-01-30 VITALS
BODY MASS INDEX: 22.96 KG/M2 | WEIGHT: 160 LBS | SYSTOLIC BLOOD PRESSURE: 132 MMHG | DIASTOLIC BLOOD PRESSURE: 50 MMHG | OXYGEN SATURATION: 98 % | HEART RATE: 72 BPM

## 2024-01-30 DIAGNOSIS — M89.9 BONE LESION: Primary | ICD-10-CM

## 2024-01-30 DIAGNOSIS — M48.062 SPINAL STENOSIS OF LUMBAR REGION WITH NEUROGENIC CLAUDICATION: Chronic | ICD-10-CM

## 2024-01-30 DIAGNOSIS — N18.32 STAGE 3B CHRONIC KIDNEY DISEASE: Chronic | ICD-10-CM

## 2024-01-30 DIAGNOSIS — C61 PROSTATE CANCER: ICD-10-CM

## 2024-01-30 DIAGNOSIS — R93.7 ABNORMAL MRI, LUMBAR SPINE: ICD-10-CM

## 2024-01-30 DIAGNOSIS — N18.32 ANEMIA DUE TO STAGE 3B CHRONIC KIDNEY DISEASE: ICD-10-CM

## 2024-01-30 DIAGNOSIS — I10 ESSENTIAL HYPERTENSION: Chronic | ICD-10-CM

## 2024-01-30 DIAGNOSIS — N28.1 BILATERAL RENAL CYSTS: ICD-10-CM

## 2024-01-30 DIAGNOSIS — D63.1 ANEMIA DUE TO STAGE 3B CHRONIC KIDNEY DISEASE: ICD-10-CM

## 2024-01-30 PROCEDURE — 84153 ASSAY OF PSA TOTAL: CPT | Performed by: INTERNAL MEDICINE

## 2024-01-30 PROCEDURE — 83540 ASSAY OF IRON: CPT | Performed by: INTERNAL MEDICINE

## 2024-01-30 PROCEDURE — 82607 VITAMIN B-12: CPT | Performed by: INTERNAL MEDICINE

## 2024-01-30 PROCEDURE — 82306 VITAMIN D 25 HYDROXY: CPT | Performed by: INTERNAL MEDICINE

## 2024-01-30 PROCEDURE — 80061 LIPID PANEL: CPT | Performed by: INTERNAL MEDICINE

## 2024-01-30 PROCEDURE — 82746 ASSAY OF FOLIC ACID SERUM: CPT | Performed by: INTERNAL MEDICINE

## 2024-01-30 PROCEDURE — 84466 ASSAY OF TRANSFERRIN: CPT | Performed by: INTERNAL MEDICINE

## 2024-01-30 PROCEDURE — 84443 ASSAY THYROID STIM HORMONE: CPT | Performed by: INTERNAL MEDICINE

## 2024-01-30 PROCEDURE — 80053 COMPREHEN METABOLIC PANEL: CPT | Performed by: INTERNAL MEDICINE

## 2024-01-30 PROCEDURE — 85027 COMPLETE CBC AUTOMATED: CPT | Performed by: INTERNAL MEDICINE

## 2024-01-30 RX ORDER — HYDROCODONE BITARTRATE AND ACETAMINOPHEN 5; 325 MG/1; MG/1
1 TABLET ORAL EVERY 6 HOURS PRN
Qty: 120 TABLET | Refills: 0 | Status: SHIPPED | OUTPATIENT
Start: 2024-01-30

## 2024-01-30 RX ORDER — PANTOPRAZOLE SODIUM 40 MG/1
40 FOR SUSPENSION ORAL DAILY
COMMUNITY

## 2024-01-30 RX ORDER — HYDRALAZINE HYDROCHLORIDE 50 MG/1
50 TABLET, FILM COATED ORAL DAILY
COMMUNITY

## 2024-01-30 RX ORDER — EPLERENONE 25 MG/1
25 TABLET, FILM COATED ORAL DAILY
COMMUNITY

## 2024-01-30 NOTE — PROGRESS NOTES
"  Patient Name: Rubén Rivas Today's Date: 2024   Patient MRN / CSN: [id number] / [id number] Date of Encounter: 2024   Patient Age / : 88 y.o. / 1935 Encounter Provider: Laura Fulton DO   Referring Physician: No ref. provider found          Rubén is a 88 y.o. male who is being seen today for mri results and Back Pain      History of Present Illness    Rubén presents today with complaints of severe back pain, presenting to discuss his recent MRI.  He has a past medical history including osteoporosis with history of thoracic and lumbar compression fractures, degenerative disc disease and spinal stenosis of the lumbar spine, osteoarthritis, coronary artery disease, hypertension, hyperlipidemia, paroxysmal atrial fibrillation, gastroesophageal reflux disease, stage III CKD, and remote history of prostate cancer diagnosed in .  He reports the lumbar back pain has become severe over the past few weeks.  He is unable to find a comfortable position, and the pain is disruptive of sleep.  He has been taking hydrocodone 5 mg twice daily as needed with some relief.  He reports the pain will improve from 10/10 down to 5/10 after taking hydrocodone.  However the hydrocodone will typically only control his pain for about 4 to 5 hours.  He also has tizanidine to take as needed.  His most recent MRI done 2024 showed \"multiple lesions in the lumbar spine and sacral vertebral bodies suspicious for metastatic disease \".  There was no loss of vertebral body height and T11 with edema, suspicion for compression fracture.  Multilevel degenerative changes with severe spinal stenosis was also seen.  There is an indeterminate right renal lesion visualized and follow-up ultrasound or CT was recommended to evaluate this further.    Allergies include:Sulfa antibiotics  Current Outpatient Medications   Medication Sig Dispense Refill    alendronate (Fosamax) 70 MG tablet Take 1 tablet by mouth Every 7 (Seven) Days. 4 " tablet 6    aspirin 81 MG tablet Take  by mouth daily.      Budesonide (ENTOCORT EC) 3 MG 24 hr capsule Take 2 capsules by mouth Daily.      clopidogrel (PLAVIX) 75 MG tablet TAKE 1 TABLET BY MOUTH DAILY. 90 tablet 1    colesevelam (WELCHOL) 625 MG tablet Take 1 tablet by mouth 2 (Two) Times a Day.      diphenhydrAMINE-acetaminophen (TYLENOL PM)  MG tablet per tablet Take 1 tablet by mouth.      diphenoxylate-atropine (Lomotil) 2.5-0.025 MG per tablet Take 1 tablet by mouth 4 (Four) Times a Day As Needed for Diarrhea. 20 tablet 0    docusate sodium 100 MG capsule Take 1 capsule by mouth.      donepezil (Aricept) 10 MG tablet Take 2 tabs po nightly. 180 tablet 3    eplerenone (INSPRA) 25 MG tablet Take 1 tablet by mouth Daily.      escitalopram (LEXAPRO) 20 MG tablet Take 1 tablet by mouth once daily 90 tablet 3    esomeprazole (nexIUM) 40 MG capsule TAKE 1 CAPSULE BY MOUTH EVERY MORNING BEFORE BREAKFAST. 90 capsule 3    ferrous gluconate (FERGON) 324 MG tablet Take 1 tablet by mouth Daily With Breakfast. 90 tablet 3    FIBER PO Take  by mouth Daily.      Glucose Blood (ONETOUCH ULTRA BLUE VI) daily.      hydrALAZINE (APRESOLINE) 50 MG tablet Take 1 tablet by mouth Daily. MID-DAY      hydroCHLOROthiazide (MICROZIDE) 12.5 MG capsule Take 1 capsule by mouth Daily.      HYDROcodone-acetaminophen (NORCO) 5-325 MG per tablet Take 1 tablet by mouth Every 6 (Six) Hours As Needed for Severe Pain. 120 tablet 0    losartan (COZAAR) 50 MG tablet Take 1 tablet by mouth Daily.      metoprolol tartrate (LOPRESSOR) 25 MG tablet Take 1 tablet by mouth 2 (Two) Times a Day. 180 tablet 1    Multiple Vitamins-Minerals (ONE-A-DAY MENS 50+ ADVANTAGE PO) Take  by mouth daily.      mupirocin (BACTROBAN) 2 % ointment Apply  topically to the appropriate area as directed 3 (Three) Times a Day. 15 g 1    pantoprazole (Protonix) 40 MG pack packet Take 1 packet by mouth Daily.      rosuvastatin (CRESTOR) 20 MG tablet Take 1 tablet by mouth  once daily 90 tablet 0    tamsulosin (FLOMAX) 0.4 MG capsule 24 hr capsule Take 1 capsule by mouth Daily. 90 capsule 3    tiZANidine (ZANAFLEX) 2 MG tablet TAKE 1 TABLET BY MOUTH 2 (TWO) TIMES A DAY AS NEEDED FOR MUSCLE SPASMS. 60 tablet 5    vitamin B-12 (CYANOCOBALAMIN) 1000 MCG tablet Take 1 tablet by mouth Daily.       No current facility-administered medications for this visit.     Past Medical History:   Diagnosis Date    Anemia     Arthritis     Bilateral renal cysts 2023    Depression     Enlarged prostate 2016    Hyperlipidemia     Hypertension     Prostate cancer 2013    Stage 3 chronic kidney disease 2021     Family History   Problem Relation Age of Onset    Cancer Mother 35        breast    Heart attack Father     Cancer Sister         cervical    Heart attack Brother     Heart disease Brother     Cancer Brother         Prostate     Past Surgical History:   Procedure Laterality Date    BACK SURGERY      CARDIAC SURGERY      COLONOSCOPY      HERNIA REPAIR      LAPAROSCOPIC CHOLECYSTECTOMY  2020    PERFORMED AT [facility]    STOMACH SURGERY       Social History     Substance and Sexual Activity   Alcohol Use No     Social History     Tobacco Use   Smoking Status Former    Packs/day: 0.25    Years: 15.00    Additional pack years: 0.00    Total pack years: 3.75    Types: Cigarettes    Quit date: 1976    Years since quittin.5   Smokeless Tobacco Never     Social History     Substance and Sexual Activity   Drug Use No     Review of Systems   Constitutional:  Positive for fatigue.        Night sweats over the past 1 month   Cardiovascular:  Negative for chest pain.   Gastrointestinal:  Negative for abdominal pain and blood in stool.   Genitourinary:  Negative for hematuria.   Musculoskeletal:  Positive for arthralgias and back pain.   Psychiatric/Behavioral:  Positive for sleep disturbance.         Depression Assessment Review:  PHQ-9 Total Score:    Vital Signs &  Measurements Taken This Encounter  /50 (BP Location: Left arm, Patient Position: Sitting, Cuff Size: Adult)   Pulse 72   Wt 72.6 kg (160 lb) Comment: last weight  SpO2 98%   BMI 22.96 kg/m²    SpO2 Percentage    01/30/24 1444   SpO2: 98%        BMI is within normal parameters. No other follow-up for BMI required.      Physical Exam  Vitals reviewed.   Constitutional:       General: He is not in acute distress.     Comments: Rubén appears very uncomfortable due to pain.   HENT:      Head: Normocephalic and atraumatic.   Eyes:      General: No scleral icterus.     Extraocular Movements: Extraocular movements intact.      Conjunctiva/sclera: Conjunctivae normal.      Pupils: Pupils are equal, round, and reactive to light.   Cardiovascular:      Rate and Rhythm: Normal rate and regular rhythm.   Pulmonary:      Effort: Pulmonary effort is normal. No respiratory distress.      Breath sounds: Normal breath sounds. No wheezing or rhonchi.   Musculoskeletal:         General: No swelling.      Comments: Patient is wearing a lumbar back brace.  He still appears very uncomfortable   Skin:     General: Skin is warm and dry.      Coloration: Skin is not jaundiced.   Neurological:      Mental Status: He is alert.   Psychiatric:         Mood and Affect: Mood normal.         Behavior: Behavior normal.              Assessment & Plan  Patient Active Problem List   Diagnosis    Anemia    Chronic coronary artery disease    Chronic osteoarthritis    Depression    Enlarged prostate    Hyperlipidemia    Essential hypertension    Iron deficiency    Low back pain    Prostate cancer    Mild cognitive impairment with memory loss    Stage 3 chronic kidney disease    Spinal stenosis of lumbar region    DDD (degenerative disc disease), lumbar    Diverticulosis    Gastroesophageal reflux disease without esophagitis    Nontraumatic compression fracture of L1 vertebra    Age-related osteoporosis with current pathological fracture with  delayed healing    T12 compression fracture, with delayed healing, subsequent encounter    Paroxysmal atrial fibrillation    Compression fracture of L1 lumbar vertebra    Bilateral renal cysts    Frequency of micturition    Platelets decreased       ICD-10-CM ICD-9-CM   1. Bone lesion  M89.9 733.90   2. Spinal stenosis of lumbar region with neurogenic claudication  M48.062 724.03   3. Abnormal MRI, lumbar spine  R93.7 793.7   4. Prostate cancer  C61 185   5. Anemia due to stage 3b chronic kidney disease  N18.32 285.21    D63.1 585.3   6. Bilateral renal cysts  N28.1 753.10   7. Stage 3b chronic kidney disease  N18.32 585.3   8. Essential hypertension  I10 401.9     Orders Placed This Encounter   Procedures    CT Abdomen Pelvis With & Without Contrast     Standing Status:   Future     Standing Expiration Date:   1/30/2025     Scheduling Instructions:      Do renal protocol please. Schedule asap     Order Specific Question:   Will Oral Contrast be needed for this procedure?     Answer:   Yes     Order Specific Question:   Release to patient     Answer:   Routine Release [[id number]]    NM PET/CT Skull Base to Mid Thigh     Standing Status:   Future     Standing Expiration Date:   1/30/2025     Scheduling Instructions:      Schedule asap.     Order Specific Question:   What radiopharmaceutical is preferred for this exam?     Answer:   FDG  (offered at all sites)     Order Specific Question:   Release to patient     Answer:   Routine Release [[id number]]    CBC (No Diff)     Order Specific Question:   Release to patient     Answer:   Routine Release [[id number]]    Comprehensive Metabolic Panel     Order Specific Question:   Release to patient     Answer:   Routine Release [[id number]]    Lipid Panel     Order Specific Question:   Release to patient     Answer:   Routine Release [[id number]]    TSH     Order Specific Question:   Release to patient     Answer:   Routine Release [[id number]]    Vitamin D,25-Hydroxy      Order Specific Question:   Release to patient     Answer:   Routine Release [[id number]]    Iron Profile     Order Specific Question:   Release to patient     Answer:   Routine Release [[id number]]    Vitamin B12     Order Specific Question:   Release to patient     Answer:   Routine Release [[id number]]    Folate     Order Specific Question:   Release to patient     Answer:   Routine Release [[id number]]    PSA DIAGNOSTIC     Order Specific Question:   Release to patient     Answer:   Routine Release [[id number]]       Meds Ordered During Visit:  New Medications Ordered This Visit   Medications    HYDROcodone-acetaminophen (NORCO) 5-325 MG per tablet     Sig: Take 1 tablet by mouth Every 6 (Six) Hours As Needed for Severe Pain.     Dispense:  120 tablet     Refill:  0     I discussed MRI findings with patient and spouse today.  I recommended CT of the abdomen and pelvis, renal protocol, and PET scan.  Patient is agreeable to this and we will plan to schedule this ASAP.  We will do labs today as above.  I reviewed PDMP.  I will increase the frequency of hydrocodone to 4 times daily as needed instead of twice daily as needed for better pain control at this time.  We will plan on close clinical follow-up.    Return in about 2 weeks (around 2/13/2024), or if symptoms worsen or fail to improve, for Recheck.          Referring Provider (if known): No ref. provider found      This document has been electronically signed by Laura Fulton DO  January 31, 2024 07:58 EST    Laura Fulton DO, FACOI  [address] [place] [address]  [phone] (office)    Part of this note may be an electronic transcription/translation of spoken language to printed text using the Dragon Dictation System.

## 2024-01-30 NOTE — TELEPHONE ENCOUNTER
.    Pharmacy Name:  [facility]    Reference Number (if applicable):     Pharmacy representative name: EVI    Pharmacy representative phone number: [phone]     What medication are you calling in regards to: HYDROCODONE    What question does the pharmacy have: VERIFY QUANTITY    Who is the provider that prescribed the medication: CHANDLER    Additional notes:

## 2024-01-30 NOTE — TELEPHONE ENCOUNTER
I called and spoke with pharmacist, Jase, at [facility] to clarify quantity. Please let patient know. It should be ready for pickup tomorrow.

## 2024-01-30 NOTE — PROGRESS NOTES
Venipuncture Blood Specimen Collection  Venipuncture performed in Left arm by Lauren Jiménez MA with good hemostasis. Patient tolerated the procedure well without complications.   01/30/24   Lauren Jiménez MA

## 2024-01-31 LAB
25(OH)D3 SERPL-MCNC: 27.8 NG/ML (ref 30–100)
ALBUMIN SERPL-MCNC: 3.5 G/DL (ref 3.5–5.2)
ALBUMIN/GLOB SERPL: 1.5 G/DL
ALP SERPL-CCNC: 337 U/L (ref 39–117)
ALT SERPL W P-5'-P-CCNC: 14 U/L (ref 1–41)
ANION GAP SERPL CALCULATED.3IONS-SCNC: 10.6 MMOL/L (ref 5–15)
AST SERPL-CCNC: 18 U/L (ref 1–40)
BILIRUB SERPL-MCNC: 0.2 MG/DL (ref 0–1.2)
BUN SERPL-MCNC: 25 MG/DL (ref 8–23)
BUN/CREAT SERPL: 14.1 (ref 7–25)
CALCIUM SPEC-SCNC: 8.2 MG/DL (ref 8.6–10.5)
CHLORIDE SERPL-SCNC: 107 MMOL/L (ref 98–107)
CHOLEST SERPL-MCNC: 140 MG/DL (ref 0–200)
CO2 SERPL-SCNC: 23.4 MMOL/L (ref 22–29)
CREAT SERPL-MCNC: 1.77 MG/DL (ref 0.76–1.27)
DEPRECATED RDW RBC AUTO: 47.2 FL (ref 37–54)
EGFRCR SERPLBLD CKD-EPI 2021: 36.7 ML/MIN/1.73
ERYTHROCYTE [DISTWIDTH] IN BLOOD BY AUTOMATED COUNT: 14 % (ref 12.3–15.4)
FOLATE SERPL-MCNC: >20 NG/ML (ref 4.78–24.2)
GLOBULIN UR ELPH-MCNC: 2.4 GM/DL
GLUCOSE SERPL-MCNC: 98 MG/DL (ref 65–99)
HCT VFR BLD AUTO: 24.5 % (ref 37.5–51)
HDLC SERPL-MCNC: 43 MG/DL (ref 40–60)
HGB BLD-MCNC: 8.1 G/DL (ref 13–17.7)
IRON 24H UR-MRATE: 36 MCG/DL (ref 59–158)
IRON SATN MFR SERPL: 14 % (ref 20–50)
LDLC SERPL CALC-MCNC: 57 MG/DL (ref 0–100)
LDLC/HDLC SERPL: 1.07 {RATIO}
MCH RBC QN AUTO: 30.7 PG (ref 26.6–33)
MCHC RBC AUTO-ENTMCNC: 33.1 G/DL (ref 31.5–35.7)
MCV RBC AUTO: 92.8 FL (ref 79–97)
PLATELET # BLD AUTO: 136 10*3/MM3 (ref 140–450)
PMV BLD AUTO: 11.5 FL (ref 6–12)
POTASSIUM SERPL-SCNC: 4.5 MMOL/L (ref 3.5–5.2)
PROT SERPL-MCNC: 5.9 G/DL (ref 6–8.5)
PSA SERPL-MCNC: <0.014 NG/ML (ref 0–4)
RBC # BLD AUTO: 2.64 10*6/MM3 (ref 4.14–5.8)
SODIUM SERPL-SCNC: 141 MMOL/L (ref 136–145)
TIBC SERPL-MCNC: 253 MCG/DL (ref 298–536)
TRANSFERRIN SERPL-MCNC: 170 MG/DL (ref 200–360)
TRIGL SERPL-MCNC: 256 MG/DL (ref 0–150)
TSH SERPL DL<=0.05 MIU/L-ACNC: 1.68 UIU/ML (ref 0.27–4.2)
VIT B12 BLD-MCNC: 1559 PG/ML (ref 211–946)
VLDLC SERPL-MCNC: 40 MG/DL (ref 5–40)
WBC NRBC COR # BLD AUTO: 7.02 10*3/MM3 (ref 3.4–10.8)

## 2024-02-03 DIAGNOSIS — F32.4 MAJOR DEPRESSIVE DISORDER WITH SINGLE EPISODE, IN PARTIAL REMISSION: ICD-10-CM

## 2024-02-05 DIAGNOSIS — G31.84 MILD COGNITIVE IMPAIRMENT WITH MEMORY LOSS: Chronic | ICD-10-CM

## 2024-02-05 RX ORDER — ESCITALOPRAM OXALATE 20 MG/1
TABLET ORAL
Qty: 90 TABLET | Refills: 1 | Status: SHIPPED | OUTPATIENT
Start: 2024-02-05

## 2024-02-05 RX ORDER — DONEPEZIL HYDROCHLORIDE 10 MG/1
TABLET, FILM COATED ORAL
Qty: 60 TABLET | Refills: 5 | Status: SHIPPED | OUTPATIENT
Start: 2024-02-05

## 2024-02-07 ENCOUNTER — HOSPITAL ENCOUNTER (OUTPATIENT)
Dept: PET IMAGING | Facility: HOSPITAL | Age: 89
Discharge: HOME | End: 2024-02-07
Admitting: INTERNAL MEDICINE
Payer: MEDICARE

## 2024-02-07 DIAGNOSIS — R93.7 ABNORMAL MRI, LUMBAR SPINE: ICD-10-CM

## 2024-02-07 DIAGNOSIS — M89.9 BONE LESION: ICD-10-CM

## 2024-02-07 DIAGNOSIS — C61 PROSTATE CANCER: ICD-10-CM

## 2024-02-07 DIAGNOSIS — C79.51 MALIGNANT NEOPLASM METASTATIC TO BONE: Primary | ICD-10-CM

## 2024-02-07 PROCEDURE — 78815 PET IMAGE W/CT SKULL-THIGH: CPT | Performed by: RADIOLOGY

## 2024-02-07 PROCEDURE — A9552 F18 FDG: HCPCS | Performed by: INTERNAL MEDICINE

## 2024-02-07 PROCEDURE — 78815 PET IMAGE W/CT SKULL-THIGH: CPT

## 2024-02-07 PROCEDURE — 0 FLUDEOXYGLUCOSE F18 SOLUTION: Performed by: INTERNAL MEDICINE

## 2024-02-07 RX ADMIN — FLUDEOXYGLUCOSE F 18 1 DOSE: 200 INJECTION, SOLUTION INTRAVENOUS at 12:09

## 2024-02-09 DIAGNOSIS — M80.00XG AGE-RELATED OSTEOPOROSIS WITH CURRENT PATHOLOGICAL FRACTURE WITH DELAYED HEALING: Chronic | ICD-10-CM

## 2024-02-12 ENCOUNTER — HOSPITAL ENCOUNTER (OUTPATIENT)
Dept: CT IMAGING | Facility: HOSPITAL | Age: 89
Discharge: HOME | End: 2024-02-12
Payer: MEDICARE

## 2024-02-12 DIAGNOSIS — M89.9 BONE LESION: ICD-10-CM

## 2024-02-12 DIAGNOSIS — C61 PROSTATE CANCER: ICD-10-CM

## 2024-02-12 DIAGNOSIS — C79.51 MALIGNANT NEOPLASM METASTATIC TO BONE: ICD-10-CM

## 2024-02-12 DIAGNOSIS — R93.7 ABNORMAL MRI, LUMBAR SPINE: ICD-10-CM

## 2024-02-12 PROCEDURE — 82565 ASSAY OF CREATININE: CPT

## 2024-02-12 PROCEDURE — 71250 CT THORAX DX C-: CPT

## 2024-02-12 PROCEDURE — 74176 CT ABD & PELVIS W/O CONTRAST: CPT

## 2024-02-12 PROCEDURE — 74176 CT ABD & PELVIS W/O CONTRAST: CPT | Performed by: RADIOLOGY

## 2024-02-12 PROCEDURE — 71250 CT THORAX DX C-: CPT | Performed by: RADIOLOGY

## 2024-02-12 RX ORDER — ALENDRONATE SODIUM 70 MG/1
70 TABLET ORAL WEEKLY
Qty: 12 TABLET | Refills: 3 | Status: SHIPPED | OUTPATIENT
Start: 2024-02-12

## 2024-02-13 ENCOUNTER — OFFICE VISIT (OUTPATIENT)
Dept: FAMILY MEDICINE CLINIC | Facility: CLINIC | Age: 89
End: 2024-02-13
Payer: MEDICARE

## 2024-02-13 VITALS
BODY MASS INDEX: 22.1 KG/M2 | WEIGHT: 154 LBS | OXYGEN SATURATION: 99 % | HEART RATE: 57 BPM | SYSTOLIC BLOOD PRESSURE: 132 MMHG | DIASTOLIC BLOOD PRESSURE: 52 MMHG | TEMPERATURE: 97.5 F

## 2024-02-13 DIAGNOSIS — K86.89 PANCREATIC MASS: Primary | ICD-10-CM

## 2024-02-13 DIAGNOSIS — C79.51 METASTATIC CANCER TO BONE: ICD-10-CM

## 2024-02-13 LAB — CREAT BLDA-MCNC: 1.6 MG/DL (ref 0.6–1.3)

## 2024-02-13 PROCEDURE — 1160F RVW MEDS BY RX/DR IN RCRD: CPT | Performed by: INTERNAL MEDICINE

## 2024-02-13 PROCEDURE — 99214 OFFICE O/P EST MOD 30 MIN: CPT | Performed by: INTERNAL MEDICINE

## 2024-02-13 PROCEDURE — 1159F MED LIST DOCD IN RCRD: CPT | Performed by: INTERNAL MEDICINE

## 2024-02-14 ENCOUNTER — TELEPHONE (OUTPATIENT)
Dept: FAMILY MEDICINE CLINIC | Facility: CLINIC | Age: 89
End: 2024-02-14
Payer: MEDICARE